# Patient Record
Sex: FEMALE | Race: BLACK OR AFRICAN AMERICAN | NOT HISPANIC OR LATINO | Employment: FULL TIME | URBAN - METROPOLITAN AREA
[De-identification: names, ages, dates, MRNs, and addresses within clinical notes are randomized per-mention and may not be internally consistent; named-entity substitution may affect disease eponyms.]

---

## 2017-10-17 ENCOUNTER — APPOINTMENT (OUTPATIENT)
Dept: ULTRASOUND IMAGING | Facility: HOSPITAL | Age: 32
End: 2017-10-17

## 2017-10-17 ENCOUNTER — HOSPITAL ENCOUNTER (EMERGENCY)
Facility: HOSPITAL | Age: 32
Discharge: HOME | End: 2017-10-17
Attending: EMERGENCY MEDICINE | Admitting: EMERGENCY MEDICINE

## 2017-10-17 VITALS
RESPIRATION RATE: 18 BRPM | BODY MASS INDEX: 40.97 KG/M2 | OXYGEN SATURATION: 98 % | SYSTOLIC BLOOD PRESSURE: 170 MMHG | WEIGHT: 240 LBS | HEIGHT: 64 IN | TEMPERATURE: 98.5 F | DIASTOLIC BLOOD PRESSURE: 116 MMHG | HEART RATE: 90 BPM

## 2017-10-17 DIAGNOSIS — D25.9 UTERINE LEIOMYOMA, UNSPECIFIED LOCATION: ICD-10-CM

## 2017-10-17 DIAGNOSIS — R10.30 LOWER ABDOMINAL PAIN: Primary | ICD-10-CM

## 2017-10-17 DIAGNOSIS — N83.201 CYSTS OF BOTH OVARIES: ICD-10-CM

## 2017-10-17 DIAGNOSIS — N83.202 CYSTS OF BOTH OVARIES: ICD-10-CM

## 2017-10-17 DIAGNOSIS — I15.9 SECONDARY HYPERTENSION: ICD-10-CM

## 2017-10-17 LAB
ALBUMIN SERPL-MCNC: 4.4 G/DL (ref 3.2–4.8)
ALBUMIN/GLOB SERPL: 1.4 G/DL (ref 1.5–2.5)
ALP SERPL-CCNC: 72 U/L (ref 25–100)
ALT SERPL W P-5'-P-CCNC: 24 U/L (ref 7–40)
ANION GAP SERPL CALCULATED.3IONS-SCNC: 4 MMOL/L (ref 3–11)
AST SERPL-CCNC: 16 U/L (ref 0–33)
B-HCG UR QL: NEGATIVE
BACTERIA UR QL AUTO: ABNORMAL /HPF
BASOPHILS # BLD AUTO: 0.05 10*3/MM3 (ref 0–0.2)
BASOPHILS NFR BLD AUTO: 0.6 % (ref 0–1)
BILIRUB SERPL-MCNC: 0.3 MG/DL (ref 0.3–1.2)
BILIRUB UR QL STRIP: NEGATIVE
BUN BLD-MCNC: 15 MG/DL (ref 9–23)
BUN/CREAT SERPL: 18.8 (ref 7–25)
CALCIUM SPEC-SCNC: 9.4 MG/DL (ref 8.7–10.4)
CHLORIDE SERPL-SCNC: 105 MMOL/L (ref 99–109)
CLARITY UR: ABNORMAL
CLUE CELLS SPEC QL WET PREP: ABNORMAL
CO2 SERPL-SCNC: 30 MMOL/L (ref 20–31)
COLOR UR: YELLOW
CREAT BLD-MCNC: 0.8 MG/DL (ref 0.6–1.3)
DEPRECATED RDW RBC AUTO: 45.9 FL (ref 37–54)
EOSINOPHIL # BLD AUTO: 0.14 10*3/MM3 (ref 0–0.3)
EOSINOPHIL NFR BLD AUTO: 1.8 % (ref 0–3)
ERYTHROCYTE [DISTWIDTH] IN BLOOD BY AUTOMATED COUNT: 15.6 % (ref 11.3–14.5)
GFR SERPL CREATININE-BSD FRML MDRD: 100 ML/MIN/1.73
GLOBULIN UR ELPH-MCNC: 3.2 GM/DL
GLUCOSE BLD-MCNC: 97 MG/DL (ref 70–100)
GLUCOSE UR STRIP-MCNC: NEGATIVE MG/DL
HCT VFR BLD AUTO: 32.7 % (ref 34.5–44)
HGB BLD-MCNC: 9.8 G/DL (ref 11.5–15.5)
HGB UR QL STRIP.AUTO: NEGATIVE
HOLD SPECIMEN: NORMAL
HOLD SPECIMEN: NORMAL
HYALINE CASTS UR QL AUTO: ABNORMAL /LPF
HYDATID CYST SPEC WET PREP: ABNORMAL
IMM GRANULOCYTES # BLD: 0.01 10*3/MM3 (ref 0–0.03)
IMM GRANULOCYTES NFR BLD: 0.1 % (ref 0–0.6)
INTERNAL NEGATIVE CONTROL: NEGATIVE
INTERNAL POSITIVE CONTROL: POSITIVE
KETONES UR QL STRIP: NEGATIVE
KOH PREP NAIL: NORMAL
LEUKOCYTE ESTERASE UR QL STRIP.AUTO: ABNORMAL
LIPASE SERPL-CCNC: 36 U/L (ref 6–51)
LYMPHOCYTES # BLD AUTO: 3.24 10*3/MM3 (ref 0.6–4.8)
LYMPHOCYTES NFR BLD AUTO: 41 % (ref 24–44)
Lab: NORMAL
MCH RBC QN AUTO: 24.4 PG (ref 27–31)
MCHC RBC AUTO-ENTMCNC: 30 G/DL (ref 32–36)
MCV RBC AUTO: 81.3 FL (ref 80–99)
MONOCYTES # BLD AUTO: 0.57 10*3/MM3 (ref 0–1)
MONOCYTES NFR BLD AUTO: 7.2 % (ref 0–12)
NEUTROPHILS # BLD AUTO: 3.89 10*3/MM3 (ref 1.5–8.3)
NEUTROPHILS NFR BLD AUTO: 49.3 % (ref 41–71)
NITRITE UR QL STRIP: NEGATIVE
PH UR STRIP.AUTO: 6 [PH] (ref 5–8)
PLATELET # BLD AUTO: 427 10*3/MM3 (ref 150–450)
PMV BLD AUTO: 9.6 FL (ref 6–12)
POTASSIUM BLD-SCNC: 4 MMOL/L (ref 3.5–5.5)
PROT SERPL-MCNC: 7.6 G/DL (ref 5.7–8.2)
PROT UR QL STRIP: NEGATIVE
RBC # BLD AUTO: 4.02 10*6/MM3 (ref 3.89–5.14)
RBC # UR: ABNORMAL /HPF
REF LAB TEST METHOD: ABNORMAL
SODIUM BLD-SCNC: 139 MMOL/L (ref 132–146)
SP GR UR STRIP: 1.03 (ref 1–1.03)
SQUAMOUS #/AREA URNS HPF: ABNORMAL /HPF
T VAGINALIS SPEC QL WET PREP: ABNORMAL
UROBILINOGEN UR QL STRIP: ABNORMAL
WBC NRBC COR # BLD: 7.9 10*3/MM3 (ref 3.5–10.8)
WBC SPEC QL WET PREP: ABNORMAL
WBC UR QL AUTO: ABNORMAL /HPF
WHOLE BLOOD HOLD SPECIMEN: NORMAL
WHOLE BLOOD HOLD SPECIMEN: NORMAL
YEAST GENITAL QL WET PREP: ABNORMAL

## 2017-10-17 PROCEDURE — 80053 COMPREHEN METABOLIC PANEL: CPT | Performed by: EMERGENCY MEDICINE

## 2017-10-17 PROCEDURE — 76830 TRANSVAGINAL US NON-OB: CPT

## 2017-10-17 PROCEDURE — 87591 N.GONORRHOEAE DNA AMP PROB: CPT | Performed by: PHYSICIAN ASSISTANT

## 2017-10-17 PROCEDURE — 25010000002 CEFTRIAXONE PER 250 MG: Performed by: PHYSICIAN ASSISTANT

## 2017-10-17 PROCEDURE — 99284 EMERGENCY DEPT VISIT MOD MDM: CPT

## 2017-10-17 PROCEDURE — 83690 ASSAY OF LIPASE: CPT | Performed by: EMERGENCY MEDICINE

## 2017-10-17 PROCEDURE — 87086 URINE CULTURE/COLONY COUNT: CPT | Performed by: EMERGENCY MEDICINE

## 2017-10-17 PROCEDURE — 96372 THER/PROPH/DIAG INJ SC/IM: CPT

## 2017-10-17 PROCEDURE — 87491 CHLMYD TRACH DNA AMP PROBE: CPT | Performed by: PHYSICIAN ASSISTANT

## 2017-10-17 PROCEDURE — 25010000002 KETOROLAC TROMETHAMINE PER 15 MG: Performed by: PHYSICIAN ASSISTANT

## 2017-10-17 PROCEDURE — 96374 THER/PROPH/DIAG INJ IV PUSH: CPT

## 2017-10-17 PROCEDURE — 85025 COMPLETE CBC W/AUTO DIFF WBC: CPT | Performed by: EMERGENCY MEDICINE

## 2017-10-17 PROCEDURE — 87210 SMEAR WET MOUNT SALINE/INK: CPT | Performed by: PHYSICIAN ASSISTANT

## 2017-10-17 PROCEDURE — 81001 URINALYSIS AUTO W/SCOPE: CPT | Performed by: EMERGENCY MEDICINE

## 2017-10-17 PROCEDURE — 87220 TISSUE EXAM FOR FUNGI: CPT | Performed by: PHYSICIAN ASSISTANT

## 2017-10-17 RX ORDER — LISINOPRIL 10 MG/1
10 TABLET ORAL DAILY
Qty: 30 TABLET | Refills: 0 | Status: SHIPPED | OUTPATIENT
Start: 2017-10-17 | End: 2017-11-03 | Stop reason: ALTCHOICE

## 2017-10-17 RX ORDER — IBUPROFEN 800 MG/1
800 TABLET ORAL EVERY 8 HOURS PRN
Qty: 30 TABLET | Refills: 0 | Status: SHIPPED | OUTPATIENT
Start: 2017-10-17 | End: 2018-11-20 | Stop reason: ALTCHOICE

## 2017-10-17 RX ORDER — LIDOCAINE HYDROCHLORIDE 10 MG/ML
0.9 INJECTION, SOLUTION EPIDURAL; INFILTRATION; INTRACAUDAL; PERINEURAL ONCE
Status: COMPLETED | OUTPATIENT
Start: 2017-10-17 | End: 2017-10-17

## 2017-10-17 RX ORDER — LISINOPRIL 10 MG/1
10 TABLET ORAL ONCE
Status: COMPLETED | OUTPATIENT
Start: 2017-10-17 | End: 2017-10-17

## 2017-10-17 RX ORDER — SODIUM CHLORIDE 0.9 % (FLUSH) 0.9 %
10 SYRINGE (ML) INJECTION AS NEEDED
Status: DISCONTINUED | OUTPATIENT
Start: 2017-10-17 | End: 2017-10-17 | Stop reason: HOSPADM

## 2017-10-17 RX ORDER — LISINOPRIL 10 MG/1
10 TABLET ORAL DAILY
COMMUNITY
End: 2017-10-17

## 2017-10-17 RX ORDER — AZITHROMYCIN 250 MG/1
1000 TABLET, FILM COATED ORAL ONCE
Status: COMPLETED | OUTPATIENT
Start: 2017-10-17 | End: 2017-10-17

## 2017-10-17 RX ORDER — CLONIDINE HYDROCHLORIDE 0.1 MG/1
0.1 TABLET ORAL ONCE
Status: COMPLETED | OUTPATIENT
Start: 2017-10-17 | End: 2017-10-17

## 2017-10-17 RX ORDER — CEFTRIAXONE SODIUM 250 MG/1
250 INJECTION, POWDER, FOR SOLUTION INTRAMUSCULAR; INTRAVENOUS ONCE
Status: COMPLETED | OUTPATIENT
Start: 2017-10-17 | End: 2017-10-17

## 2017-10-17 RX ORDER — KETOROLAC TROMETHAMINE 15 MG/ML
15 INJECTION, SOLUTION INTRAMUSCULAR; INTRAVENOUS ONCE
Status: COMPLETED | OUTPATIENT
Start: 2017-10-17 | End: 2017-10-17

## 2017-10-17 RX ADMIN — CLONIDINE HYDROCHLORIDE 0.1 MG: 0.1 TABLET ORAL at 15:02

## 2017-10-17 RX ADMIN — CEFTRIAXONE SODIUM 250 MG: 250 INJECTION, POWDER, FOR SOLUTION INTRAMUSCULAR; INTRAVENOUS at 14:50

## 2017-10-17 RX ADMIN — KETOROLAC TROMETHAMINE 15 MG: 15 INJECTION, SOLUTION INTRAMUSCULAR; INTRAVENOUS at 16:08

## 2017-10-17 RX ADMIN — AZITHROMYCIN 1000 MG: 250 TABLET, FILM COATED ORAL at 14:46

## 2017-10-17 RX ADMIN — Medication 10 ML: at 12:10

## 2017-10-17 RX ADMIN — LISINOPRIL 10 MG: 10 TABLET ORAL at 13:20

## 2017-10-17 RX ADMIN — LIDOCAINE HYDROCHLORIDE 0.9 ML: 10 INJECTION, SOLUTION EPIDURAL; INFILTRATION; INTRACAUDAL; PERINEURAL at 14:50

## 2017-10-17 NOTE — ED PROVIDER NOTES
Subjective   HPI Comments: Pt presenting to ED with lower abdominal pain. Pt explains she has been having the pain for 6-7 months intermittently, typically worse about 2 weeks after her period. LMP  First week of October. Hx . Pt describes the pain as a pressure and aching. She denies N,V,D or constipation. No urinary sx or fevers. She has white vaginal discharge but unsure if this is normal for her. She has not been sexually active in a year. Denies prior hx of STDs. She has not been seen by her PCP at  for these complaints. She takes Motrin for the pain which does help. She denies prior hx of abd surgery. Mother with hx of Endometrial cancer. Also noted BP elevation. She states she is supposed to take Lisinopril but does not take regularly. She states her BP typically runs 180s. She denies CP, SOB, headache or visual changes.     Patient is a 32 y.o. female presenting with abdominal pain.   History provided by:  Patient  Abdominal Pain   Pain location: Lower abdomen, right worse than left   Pain quality: aching, bloating and pressure    Pain radiates to:  Does not radiate  Duration: 6-7 months.  Timing:  Intermittent  Progression since onset: Typically 2 weeks after period.  Context: not alcohol use, not diet changes, not medication withdrawal, not recent illness and not recent travel    Relieved by:  NSAIDs  Associated symptoms: vaginal discharge (Unsure if different than normal white)    Associated symptoms: no chest pain, no chills, no constipation, no cough, no diarrhea, no dysuria, no fever, no hematuria, no nausea, no shortness of breath, no sore throat, no vaginal bleeding and no vomiting    Risk factors: no alcohol abuse, has not had multiple surgeries (No prior abd surgery ) and not pregnant        Review of Systems   Constitutional: Negative for chills and fever.   HENT: Negative for congestion, ear pain, sore throat and trouble swallowing.    Eyes: Negative for pain, redness and visual  disturbance.   Respiratory: Negative for cough, chest tightness and shortness of breath.    Cardiovascular: Negative for chest pain and leg swelling.   Gastrointestinal: Positive for abdominal pain. Negative for constipation, diarrhea, nausea and vomiting.   Genitourinary: Positive for vaginal discharge (Unsure if different than normal white). Negative for difficulty urinating, dysuria, flank pain, hematuria, urgency, vaginal bleeding and vaginal pain.   Musculoskeletal: Negative for arthralgias, back pain and joint swelling.   Skin: Negative for rash and wound.   Neurological: Negative for dizziness, syncope, speech difficulty, weakness, numbness and headaches.   Psychiatric/Behavioral: Negative for confusion.   All other systems reviewed and are negative.      Past Medical History:   Diagnosis Date   • Hypertension        No Known Allergies    Past Surgical History:   Procedure Laterality Date   • DENTAL PROCEDURE     • TONSILLECTOMY         History reviewed. No pertinent family history.    Social History     Social History   • Marital status: Single     Spouse name: N/A   • Number of children: N/A   • Years of education: N/A     Social History Main Topics   • Smoking status: Never Smoker   • Smokeless tobacco: None   • Alcohol use None      Comment: OCCASIONALLY   • Drug use: No   • Sexual activity: Not Asked     Other Topics Concern   • None     Social History Narrative   • None           Objective   Physical Exam   Constitutional: She is oriented to person, place, and time. Vital signs are normal. She appears well-developed.   HENT:   Head: Atraumatic.   Nose: Nose normal.   Mouth/Throat: Mucous membranes are normal.   Eyes: Conjunctivae, EOM and lids are normal. Pupils are equal, round, and reactive to light.   Neck: Normal range of motion. Neck supple.   Cardiovascular: Normal rate, regular rhythm and normal heart sounds.    Pulmonary/Chest: Effort normal and breath sounds normal. She has no wheezes.    Abdominal: Soft. She exhibits no distension. There is tenderness in the right lower quadrant, suprapubic area and left lower quadrant. There is no rebound, no guarding and no CVA tenderness.   Obese    Genitourinary: Cervix exhibits no motion tenderness. Vaginal discharge (white) found.   Musculoskeletal: Normal range of motion. She exhibits no edema or tenderness.   Neurological: She is alert and oriented to person, place, and time. No sensory deficit.   Skin: Skin is warm and dry. No rash noted. No erythema.   Psychiatric: She has a normal mood and affect. Her speech is normal and behavior is normal.   Nursing note and vitals reviewed.      Procedures         ED Course  ED Course      Re-examined patient several times in ED. No new complaints. Discussed results and need to f/u with PCP and OBGYN as soon as possible, especially with family hx. She states she understands. No signs of an acute abdomen or appendicitis. She understands to return to ED if new or worse sx. She is agreeable to tx with Rocephin and Azithromycin to cover for possible STD cause of pain / vaginal discharge. She also understands to keep a log of her BP and f/u with PCP regarding elevation ASAP and to take her medication regularly.     Recent Results (from the past 24 hour(s))   Comprehensive Metabolic Panel    Collection Time: 10/17/17 12:09 PM   Result Value Ref Range    Glucose 97 70 - 100 mg/dL    BUN 15 9 - 23 mg/dL    Creatinine 0.80 0.60 - 1.30 mg/dL    Sodium 139 132 - 146 mmol/L    Potassium 4.0 3.5 - 5.5 mmol/L    Chloride 105 99 - 109 mmol/L    CO2 30.0 20.0 - 31.0 mmol/L    Calcium 9.4 8.7 - 10.4 mg/dL    Total Protein 7.6 5.7 - 8.2 g/dL    Albumin 4.40 3.20 - 4.80 g/dL    ALT (SGPT) 24 7 - 40 U/L    AST (SGOT) 16 0 - 33 U/L    Alkaline Phosphatase 72 25 - 100 U/L    Total Bilirubin 0.3 0.3 - 1.2 mg/dL    eGFR  African Amer 100 >60 mL/min/1.73    Globulin 3.2 gm/dL    A/G Ratio 1.4 (L) 1.5 - 2.5 g/dL    BUN/Creatinine Ratio 18.8  7.0 - 25.0    Anion Gap 4.0 3.0 - 11.0 mmol/L   Lipase    Collection Time: 10/17/17 12:09 PM   Result Value Ref Range    Lipase 36 6 - 51 U/L   Light Blue Top    Collection Time: 10/17/17 12:09 PM   Result Value Ref Range    Extra Tube hold for add-on    Green Top (Gel)    Collection Time: 10/17/17 12:09 PM   Result Value Ref Range    Extra Tube Hold for add-ons.    Lavender Top    Collection Time: 10/17/17 12:09 PM   Result Value Ref Range    Extra Tube hold for add-on    Gold Top - SST    Collection Time: 10/17/17 12:09 PM   Result Value Ref Range    Extra Tube Hold for add-ons.    CBC Auto Differential    Collection Time: 10/17/17 12:09 PM   Result Value Ref Range    WBC 7.90 3.50 - 10.80 10*3/mm3    RBC 4.02 3.89 - 5.14 10*6/mm3    Hemoglobin 9.8 (L) 11.5 - 15.5 g/dL    Hematocrit 32.7 (L) 34.5 - 44.0 %    MCV 81.3 80.0 - 99.0 fL    MCH 24.4 (L) 27.0 - 31.0 pg    MCHC 30.0 (L) 32.0 - 36.0 g/dL    RDW 15.6 (H) 11.3 - 14.5 %    RDW-SD 45.9 37.0 - 54.0 fl    MPV 9.6 6.0 - 12.0 fL    Platelets 427 150 - 450 10*3/mm3    Neutrophil % 49.3 41.0 - 71.0 %    Lymphocyte % 41.0 24.0 - 44.0 %    Monocyte % 7.2 0.0 - 12.0 %    Eosinophil % 1.8 0.0 - 3.0 %    Basophil % 0.6 0.0 - 1.0 %    Immature Grans % 0.1 0.0 - 0.6 %    Neutrophils, Absolute 3.89 1.50 - 8.30 10*3/mm3    Lymphocytes, Absolute 3.24 0.60 - 4.80 10*3/mm3    Monocytes, Absolute 0.57 0.00 - 1.00 10*3/mm3    Eosinophils, Absolute 0.14 0.00 - 0.30 10*3/mm3    Basophils, Absolute 0.05 0.00 - 0.20 10*3/mm3    Immature Grans, Absolute 0.01 0.00 - 0.03 10*3/mm3   Urinalysis With / Culture If Indicated - Urine, Clean Catch    Collection Time: 10/17/17  1:20 PM   Result Value Ref Range    Color, UA Yellow Yellow, Straw    Appearance, UA Cloudy (A) Clear    pH, UA 6.0 5.0 - 8.0    Specific Gravity, UA 1.028 1.001 - 1.030    Glucose, UA Negative Negative    Ketones, UA Negative Negative    Bilirubin, UA Negative Negative    Blood, UA Negative Negative    Protein, UA  Negative Negative    Leuk Esterase, UA Moderate (2+) (A) Negative    Nitrite, UA Negative Negative    Urobilinogen, UA 1.0 E.U./dL 0.2 - 1.0 E.U./dL   Urinalysis, Microscopic Only - Urine, Clean Catch    Collection Time: 10/17/17  1:20 PM   Result Value Ref Range    RBC, UA 3-6 (A) None Seen, 0-2 /HPF    WBC, UA 6-12 (A) None Seen /HPF    Bacteria, UA None Seen None Seen, Trace /HPF    Squamous Epithelial Cells, UA 7-12 (A) None Seen, 0-2 /HPF    Hyaline Casts, UA 0-6 0 - 6 /LPF    Methodology Automated Microscopy    POCT pregnancy, urine    Collection Time: 10/17/17  1:21 PM   Result Value Ref Range    HCG, Urine, QL Negative Negative    Lot Number [id number]     Internal Positive Control Positive     Internal Negative Control Negative    KOH Prep - Swab, Vagina    Collection Time: 10/17/17  2:12 PM   Result Value Ref Range    KOH Prep No yeast or hyphal elements seen No yeast or hyphal elements seen   Wet Prep, Genital - Swab, Vagina    Collection Time: 10/17/17  2:12 PM   Result Value Ref Range    YEAST No yeast seen No yeast seen    HYPHAL ELEMENTS No Hyphal elements seen No Hyphal elements seen    WBC'S 1+ WBC's seen (A) No WBC's seen    Clue Cells, Wet Prep No Clue cells seen No Clue cells seen    Trichomonas, Wet Prep No Trichomonas seen No Trichomonas seen     Note: In addition to lab results from this visit, the labs listed above may include labs taken at another facility or during a different encounter within the last 24 hours. Please correlate lab times with ED admission and discharge times for further clarification of the services performed during this visit.    US Non-ob Transvaginal   Preliminary Result   1. Enlarged uterus with multiple fibroids.   2. Complex bilateral ovarian cysts, 3 cm in diameter on the right and 2   cm on the left.       D:  10/17/2017   E:  10/17/2017                     Vitals:    10/17/17 1444 10/17/17 1501 10/17/17 1539 10/17/17 1605   BP: (!) 189/123 (!) 189/119 (!) 180/119  (!) 170/116   BP Location:       Patient Position:       Pulse: 86 90 95 90   Resp: 18  18 18   Temp:       TempSrc:       SpO2: 99%  99% 98%   Weight:       Height:         Medications   sodium chloride 0.9 % flush 10 mL (10 mL Intravenous Given by Other 10/17/17 1210)   lisinopril (PRINIVIL,ZESTRIL) tablet 10 mg (10 mg Oral Given 10/17/17 1320)   cefTRIAXone (ROCEPHIN) injection 250 mg (250 mg Intramuscular Given 10/17/17 1450)   lidocaine PF 1% (XYLOCAINE) injection 0.9 mL (0.9 mL Injection Given 10/17/17 1450)   azithromycin (ZITHROMAX) tablet 1,000 mg (1,000 mg Oral Given 10/17/17 1446)   CloNIDine (CATAPRES) tablet 0.1 mg (0.1 mg Oral Given 10/17/17 1502)   ketorolac (TORADOL) injection 15 mg (15 mg Intravenous Given 10/17/17 1608)                      MDM    Final diagnoses:   Lower abdominal pain   Uterine leiomyoma, unspecified location   Secondary hypertension   Cysts of both ovaries            WKASI Contreras  10/17/17 1597

## 2017-10-19 LAB
BACTERIA SPEC AEROBE CULT: NORMAL
BACTERIA SPEC AEROBE CULT: NORMAL
C TRACH RRNA SPEC DONR QL NAA+PROBE: NEGATIVE
N GONORRHOEA DNA SPEC QL NAA+PROBE: NEGATIVE

## 2017-11-03 ENCOUNTER — OFFICE VISIT (OUTPATIENT)
Dept: OBSTETRICS AND GYNECOLOGY | Facility: CLINIC | Age: 32
End: 2017-11-03

## 2017-11-03 ENCOUNTER — LAB REQUISITION (OUTPATIENT)
Dept: LAB | Facility: HOSPITAL | Age: 32
End: 2017-11-03

## 2017-11-03 VITALS
BODY MASS INDEX: 42.14 KG/M2 | SYSTOLIC BLOOD PRESSURE: 160 MMHG | WEIGHT: 246.8 LBS | HEART RATE: 87 BPM | DIASTOLIC BLOOD PRESSURE: 110 MMHG | RESPIRATION RATE: 14 BRPM | HEIGHT: 64 IN | OXYGEN SATURATION: 99 %

## 2017-11-03 DIAGNOSIS — D25.1 INTRAMURAL LEIOMYOMA OF UTERUS: ICD-10-CM

## 2017-11-03 DIAGNOSIS — D25.2 INTRAMURAL AND SUBSEROUS LEIOMYOMA OF UTERUS: Primary | ICD-10-CM

## 2017-11-03 DIAGNOSIS — Z01.419 ENCOUNTER FOR GYNECOLOGICAL EXAMINATION WITHOUT ABNORMAL FINDING: ICD-10-CM

## 2017-11-03 DIAGNOSIS — D25.1 INTRAMURAL AND SUBSEROUS LEIOMYOMA OF UTERUS: Primary | ICD-10-CM

## 2017-11-03 DIAGNOSIS — Z00.00 ROUTINE GENERAL MEDICAL EXAMINATION AT A HEALTH CARE FACILITY: ICD-10-CM

## 2017-11-03 PROCEDURE — 99385 PREV VISIT NEW AGE 18-39: CPT | Performed by: OBSTETRICS & GYNECOLOGY

## 2017-11-03 PROCEDURE — 36415 COLL VENOUS BLD VENIPUNCTURE: CPT | Performed by: OBSTETRICS & GYNECOLOGY

## 2017-11-03 RX ORDER — AMLODIPINE BESYLATE 10 MG/1
10 TABLET ORAL DAILY
COMMUNITY
End: 2019-02-11 | Stop reason: SDUPTHER

## 2017-11-04 LAB — CANCER AG125 SERPL-ACNC: 17.7 U/ML (ref 0–38.1)

## 2017-11-08 ENCOUNTER — RESULTS ENCOUNTER (OUTPATIENT)
Dept: OBSTETRICS AND GYNECOLOGY | Facility: CLINIC | Age: 32
End: 2017-11-08

## 2017-11-08 DIAGNOSIS — D25.2 INTRAMURAL AND SUBSEROUS LEIOMYOMA OF UTERUS: ICD-10-CM

## 2017-11-08 DIAGNOSIS — D25.1 INTRAMURAL AND SUBSEROUS LEIOMYOMA OF UTERUS: ICD-10-CM

## 2017-11-13 NOTE — PROGRESS NOTES
Subjective   Chief Complaint   Patient presents with   • Establish Care     Seen in ER for pelvic pain and was Dx'd with Uterine fibroid and john ovarian cyst.     Deanna Putnam is a 32 y.o. year old  presenting to be seen for her annual exam. She was seen in the OR approximately 2 weeks ago with pelvic pain and was noted to have uterine fibroids and bilateral ovarian cysts.  The pain has been going on since mid April, occurs midcycle and lasts for 6-10 days on average.  Pain has been increasing in intensity since starting in April and is described as crampy in nature.  No aggravating or alleviating factors.    SEXUAL Hx:  She is not currently sexually active.  In the past year there has not been new sexual partners.    Condoms are not typically used.  She would not like to be screened for STD's at today's exam.  Current birth control method: abstinence.  She is happy with her current method of contraception and does not want to discuss alternative methods of contraception.  MENSTRUAL Hx:  Patient's last menstrual period was 10/29/2017 (exact date).  In the past 6 months her cycles have been regular, predictable and occur monthly.  Her menstrual flow is normal.   Each month on average there are roughly 0 day(s) of very heavy flow.    Intermenstrual bleeding is absent.    Post-coital bleeding is absent.  Dysmenorrhea: is not affecting her activities of daily living  PMS: is not affecting her activities of daily living  Her cycles are not a source of concern for her that she wishes to discuss today.  HEALTH Hx:  She exercises regularly: no (and has no plans to become more active).  She wears her seat belt: yes.  She has concerns about domestic violence: no.  OTHER COMPLAINTS:  Nothing else    The following portions of the patient's history were reviewed and updated as appropriate:problem list, current medications, allergies, past family history, past medical history, past social history and past surgical  "history.    Smoking status: Never Smoker                                                              Smokeless status: Never Used                        Review of Systems  Constitutional POS: nothing reported    NEG: anorexia or night sweats   Gastointestinal POS: nothing reported    NEG: bloating, change in bowel habits, melena or reflux symptoms   Genitourinary POS: see HPI    NEG: dysuria or hematuria   Integument POS: nothing reported    NEG: moles that are changing in size, shape, color or rashes   Breast POS: nothing reported    NEG: persistent breast lump, skin dimpling or nipple discharge        Objective   BP (!) 160/110  Pulse 87  Resp 14  Ht 64\" (162.6 cm)  Wt 246 lb 12.8 oz (112 kg)  LMP 10/29/2017 (Exact Date)  SpO2 99%  Breastfeeding? No  BMI 42.36 kg/m2    General:  well developed; well nourished  no acute distress   Skin:  No suspicious lesions seen   Thyroid: normal to inspection and palpation   Breasts:  Examined in supine position  Symmetric without masses or skin dimpling  Nipples normal without inversion, lesions or discharge  There are no palpable axillary nodes   Abdomen: soft, non-tender; no masses  no umbilical or inginual hernias are present  no hepato-splenomegaly   Pelvis: Clinical staff was present for exam  External genitalia:  normal appearance of the external genitalia including Bartholin's and [place]'s glands.  :  urethral meatus normal;  Vaginal:  normal pink mucosa without prolapse or lesions.  Cervix:   normal appearance. Pap smear collected  Uterus:  normal size, shape and consistency. asymmetrically enlarged, consistent with 11 weeks size;  Adnexa:  normal bimanual exam of the adnexa.        Assessment   1. Well woman exam  2. Pelvic pain     Plan   1. Await Pap smear, lab and ultrasound results for plan of care.  Patient will return to clinic in 6 weeks for follow-up.      New Medications Ordered This Visit   Medications   • metFORMIN (GLUCOPHAGE) 500 MG tablet     " Sig: Take 500 mg by mouth Daily With Breakfast.   • amLODIPine (NORVASC) 10 MG tablet     Sig: Take 10 mg by mouth Daily.          This note was electronically signed.    Jorge Alberto Arellano MD [facility]  November 13, 2017

## 2017-12-18 ENCOUNTER — OFFICE VISIT (OUTPATIENT)
Dept: OBSTETRICS AND GYNECOLOGY | Facility: CLINIC | Age: 32
End: 2017-12-18

## 2017-12-18 VITALS
OXYGEN SATURATION: 99 % | WEIGHT: 246 LBS | DIASTOLIC BLOOD PRESSURE: 88 MMHG | HEIGHT: 64 IN | HEART RATE: 86 BPM | RESPIRATION RATE: 14 BRPM | SYSTOLIC BLOOD PRESSURE: 120 MMHG | BODY MASS INDEX: 42 KG/M2

## 2017-12-18 DIAGNOSIS — R11.0 CHRONIC NAUSEA: ICD-10-CM

## 2017-12-18 DIAGNOSIS — D25.1 INTRAMURAL AND SUBSEROUS LEIOMYOMA OF UTERUS: Primary | ICD-10-CM

## 2017-12-18 DIAGNOSIS — D25.2 INTRAMURAL AND SUBSEROUS LEIOMYOMA OF UTERUS: Primary | ICD-10-CM

## 2017-12-18 PROCEDURE — 99214 OFFICE O/P EST MOD 30 MIN: CPT | Performed by: OBSTETRICS & GYNECOLOGY

## 2017-12-18 RX ORDER — INFLUENZA A VIRUS A/SINGAPORE/GP1908/2015 IVR-180 (H1N1) ANTIGEN (MDCK CELL DERIVED, PROPIOLACTONE INACTIVATED), INFLUENZA A VIRUS A/NORTH CAROLINA/04/2016 (H3N2) HEMAGGLUTININ ANTIGEN (MDCK CELL DERIVED, PROPIOLACTONE INACTIVATED), INFLUENZA B VIRUS B/IOWA/06/2017 HEMAGGLUTININ ANTIGEN (MDCK CELL DERIVED, PROPIOLACTONE INACTIVATED), INFLUENZA B VIRUS B/SINGAPORE/INFTT-16-0610/2016 HEMAGGLUTININ ANTIGEN (MDCK CELL DERIVED, PROPIOLACTONE INACTIVATED) 15; 15; 15; 15 UG/.5ML; UG/.5ML; UG/.5ML; UG/.5ML
INJECTION, SUSPENSION INTRAMUSCULAR
Refills: 0 | COMMUNITY
Start: 2017-11-06 | End: 2018-01-11

## 2017-12-18 RX ORDER — METOCLOPRAMIDE 10 MG/1
10 TABLET ORAL
Qty: 120 TABLET | Refills: 5 | Status: SHIPPED | OUTPATIENT
Start: 2017-12-18 | End: 2018-11-20

## 2018-01-11 ENCOUNTER — OFFICE VISIT (OUTPATIENT)
Dept: GASTROENTEROLOGY | Facility: CLINIC | Age: 33
End: 2018-01-11

## 2018-01-11 VITALS
SYSTOLIC BLOOD PRESSURE: 138 MMHG | BODY MASS INDEX: 42.37 KG/M2 | TEMPERATURE: 96.9 F | WEIGHT: 248.2 LBS | DIASTOLIC BLOOD PRESSURE: 98 MMHG | HEIGHT: 64 IN | OXYGEN SATURATION: 99 % | HEART RATE: 82 BPM

## 2018-01-11 DIAGNOSIS — Z83.79 FAMILY HISTORY OF GALLBLADDER DISEASE: ICD-10-CM

## 2018-01-11 DIAGNOSIS — R10.13 EPIGASTRIC PAIN: ICD-10-CM

## 2018-01-11 DIAGNOSIS — R11.0 NAUSEA: Primary | ICD-10-CM

## 2018-01-11 PROCEDURE — 99243 OFF/OP CNSLTJ NEW/EST LOW 30: CPT | Performed by: INTERNAL MEDICINE

## 2018-01-11 NOTE — PROGRESS NOTES
PCP: Radha Kirk MD    Chief Complaint   Patient presents with   • Nausea       History of Present Illness:   HPI  I appreciate the consultation for nausea. Ms. Putnam is a 32-year-old with a history of hypertension, depression and prediabetes.  She has experienced issues with nausea that started in August of last year.  The symptoms seem to be more severe in November of last year.  The patient denies any dinorah vomiting.  There is no history of difficult or painful swallowing.  She does complain of increased gas and may have some bloating at times.  The patient denies any localized abdominal pain after meals.  She has regular bowel habits without any signs of gastrointestinal bleeding.  There is no history of night sweats, fever or chills.  Ms. Putnam denies any unexplained weight loss.  The patient admits that her father and sister both had gallbladder disease requiring surgery.  There is no family history of gastrointestinal cancer in first-degree relatives.  There is no family history of Crohn's disease or ulcerative colitis.  She does not smoke cigarettes and denies routine alcohol use. She has taken Reglan since her last visit with Dr. Arellano and this did seem to help when she would remember to take the medication.  Past Medical History:   Diagnosis Date   • Asthma    • Depression    • Hypertension    • Osteoarthritis of back    • Prediabetes        Past Surgical History:   Procedure Laterality Date   • EYE SURGERY  2015    eyelid lift   • TONSILLECTOMY     • WISDOM TOOTH EXTRACTION           Current Outpatient Prescriptions:   •  amLODIPine (NORVASC) 10 MG tablet, Take 10 mg by mouth Daily., Disp: , Rfl:   •  ibuprofen (ADVIL,MOTRIN) 800 MG tablet, Take 1 tablet by mouth Every 8 (Eight) Hours As Needed for Mild Pain  or Moderate Pain  for up to 30 doses., Disp: 30 tablet, Rfl: 0  •  metoclopramide (REGLAN) 10 MG tablet, Take 1 tablet by mouth 4 (Four) Times a Day Before Meals & at Bedtime., Disp:  120 tablet, Rfl: 5    No Known Allergies    Family History   Problem Relation Age of Onset   • Endometrial cancer Mother 59   • Breast cancer Maternal Grandmother 80   • Colon cancer Maternal Grandmother 90   • Breast cancer Maternal Aunt 60       Social History     Social History   • Marital status: Single     Spouse name: N/A   • Number of children: N/A   • Years of education: N/A     Occupational History   • Not on file.     Social History Main Topics   • Smoking status: Never Smoker   • Smokeless tobacco: Never Used   • Alcohol use Yes      Comment: OCCASIONALLY   • Drug use: No   • Sexual activity: Not Currently     Other Topics Concern   • Not on file     Social History Narrative       Review of Systems   Constitutional: Negative for activity change, appetite change, fatigue, fever and unexpected weight change.   HENT: Negative for dental problem, hearing loss, mouth sores, postnasal drip, sneezing, trouble swallowing and voice change.    Eyes: Negative for pain, redness, itching and visual disturbance.   Respiratory: Negative for cough, choking, chest tightness, shortness of breath and wheezing.    Cardiovascular: Negative for chest pain, palpitations and leg swelling.   Gastrointestinal: Positive for abdominal pain and nausea. Negative for abdominal distention, anal bleeding, blood in stool, constipation, diarrhea, rectal pain and vomiting.        Heartburn   Endocrine: Negative for cold intolerance, heat intolerance, polydipsia, polyphagia and polyuria.   Genitourinary: Negative.  Negative for dysuria, enuresis, flank pain, hematuria and urgency.   Musculoskeletal: Negative for arthralgias, back pain, gait problem, joint swelling and myalgias.   Skin: Negative for color change, pallor and rash.   Allergic/Immunologic: Negative for environmental allergies, food allergies and immunocompromised state.   Neurological: Negative for dizziness, tremors, seizures, facial asymmetry, speech difficulty, numbness and  headaches.   Hematological: Negative for adenopathy.   Psychiatric/Behavioral: Negative for behavioral problems, confusion, dysphoric mood, hallucinations and self-injury.       Vitals:    01/11/18 0916   BP: 138/98   Pulse: 82   Temp: 96.9 °F (36.1 °C)   SpO2: 99%       Physical Exam   Constitutional: She is oriented to person, place, and time. She appears well-nourished. No distress.   Morbid obesity   HENT:   Head: Normocephalic and atraumatic.   Mouth/Throat: Oropharynx is clear and moist. No oropharyngeal exudate.   Eyes: EOM are normal. No scleral icterus.   Neck: Neck supple. No thyromegaly present.   Cardiovascular: Normal rate, regular rhythm and normal heart sounds.  Exam reveals no gallop.    No murmur heard.  Pulmonary/Chest: Effort normal and breath sounds normal. She has no wheezes. She has no rales.   Abdominal: Soft. Bowel sounds are normal. There is tenderness (epigastrium). There is no rebound and no guarding.   Musculoskeletal: Normal range of motion. She exhibits no edema or tenderness.   Lymphadenopathy:     She has no cervical adenopathy.   Neurological: She is alert and oriented to person, place, and time. She exhibits normal muscle tone.   Skin: Skin is dry. No rash noted. No erythema.   Psychiatric: She has a normal mood and affect. Her behavior is normal. Thought content normal.   Vitals reviewed.      Deanna was seen today for nausea.    Diagnoses and all orders for this visit:    Nausea  -     NM Gastric Emptying; Future  -     US Liver; Future    Epigastric pain  -     NM Gastric Emptying; Future  -     US Liver; Future    Family history of gallbladder disease   The clinical history suggest possible gastroparesis.  The differential diagnosis also includes gallbladder pathology.  She denies any signs of gastrointestinal bleeding but occasionally will take nonsteroidal medication.      Plan: Will schedule a 4 hour gastric emptying study.           Will schedule an ultrasound of the right  upper quadrant to evaluate for cholelithiasis.           Encourage gradual weight reduction.           Further recommendations pending studies.    I spent over 50% of the office visit counseling and answering questions from the patient.

## 2018-01-29 ENCOUNTER — HOSPITAL ENCOUNTER (OUTPATIENT)
Dept: NUCLEAR MEDICINE | Facility: HOSPITAL | Age: 33
Discharge: HOME | End: 2018-01-29
Attending: INTERNAL MEDICINE

## 2018-01-29 ENCOUNTER — TELEPHONE (OUTPATIENT)
Dept: GASTROENTEROLOGY | Facility: CLINIC | Age: 33
End: 2018-01-29

## 2018-01-29 ENCOUNTER — HOSPITAL ENCOUNTER (OUTPATIENT)
Dept: ULTRASOUND IMAGING | Facility: HOSPITAL | Age: 33
Discharge: HOME | End: 2018-01-29
Attending: INTERNAL MEDICINE | Admitting: INTERNAL MEDICINE

## 2018-01-29 DIAGNOSIS — R11.0 NAUSEA: ICD-10-CM

## 2018-01-29 DIAGNOSIS — R10.13 EPIGASTRIC PAIN: ICD-10-CM

## 2018-01-29 PROCEDURE — A9541 TC99M SULFUR COLLOID: HCPCS | Performed by: INTERNAL MEDICINE

## 2018-01-29 PROCEDURE — 0 TECHNETIUM SULFUR COLLOID: Performed by: INTERNAL MEDICINE

## 2018-01-29 PROCEDURE — 76705 ECHO EXAM OF ABDOMEN: CPT

## 2018-01-29 PROCEDURE — 78264 GASTRIC EMPTYING IMG STUDY: CPT

## 2018-01-29 RX ADMIN — TECHNETIUM TC 99M SULFUR COLLOID 1 DOSE: KIT at 09:40

## 2018-01-29 NOTE — TELEPHONE ENCOUNTER
I called and gave Ms. Putnam the results of the emptying study and ultrasound.  The gastric emptying study was normal.  However, she did have multiple gallstones and this is likely the cause of the current symptoms.  I will refer her to Dr. Cassidy -general surgeon.

## 2018-01-30 DIAGNOSIS — R10.13 EPIGASTRIC PAIN: ICD-10-CM

## 2018-01-30 DIAGNOSIS — K80.20 CALCULUS OF GALLBLADDER WITHOUT CHOLECYSTITIS WITHOUT OBSTRUCTION: Primary | ICD-10-CM

## 2018-01-30 DIAGNOSIS — R11.0 NAUSEA: ICD-10-CM

## 2018-04-04 ENCOUNTER — LAB REQUISITION (OUTPATIENT)
Dept: LAB | Facility: HOSPITAL | Age: 33
End: 2018-04-04

## 2018-04-04 DIAGNOSIS — Z00.00 ROUTINE GENERAL MEDICAL EXAMINATION AT A HEALTH CARE FACILITY: ICD-10-CM

## 2018-04-04 PROCEDURE — 36415 COLL VENOUS BLD VENIPUNCTURE: CPT | Performed by: SURGERY

## 2018-04-16 ENCOUNTER — LAB REQUISITION (OUTPATIENT)
Dept: LAB | Facility: HOSPITAL | Age: 33
End: 2018-04-16

## 2018-04-16 DIAGNOSIS — K80.20 CALCULUS OF GALLBLADDER WITHOUT CHOLECYSTITIS WITHOUT OBSTRUCTION: ICD-10-CM

## 2018-04-16 PROCEDURE — 88304 TISSUE EXAM BY PATHOLOGIST: CPT | Performed by: SURGERY

## 2018-04-18 LAB
CYTO UR: NORMAL
LAB AP CASE REPORT: NORMAL
LAB AP CLINICAL INFORMATION: NORMAL
Lab: NORMAL
PATH REPORT.FINAL DX SPEC: NORMAL
PATH REPORT.GROSS SPEC: NORMAL

## 2018-11-20 ENCOUNTER — OFFICE VISIT (OUTPATIENT)
Dept: OBSTETRICS AND GYNECOLOGY | Facility: CLINIC | Age: 33
End: 2018-11-20

## 2018-11-20 VITALS
DIASTOLIC BLOOD PRESSURE: 110 MMHG | SYSTOLIC BLOOD PRESSURE: 162 MMHG | BODY MASS INDEX: 42.47 KG/M2 | HEIGHT: 64 IN | RESPIRATION RATE: 14 BRPM | WEIGHT: 248.8 LBS

## 2018-11-20 DIAGNOSIS — Z01.419 WELL WOMAN EXAM WITH ROUTINE GYNECOLOGICAL EXAM: ICD-10-CM

## 2018-11-20 DIAGNOSIS — G89.29 CHRONIC PELVIC PAIN IN FEMALE: ICD-10-CM

## 2018-11-20 DIAGNOSIS — N94.6 DYSMENORRHEA: ICD-10-CM

## 2018-11-20 DIAGNOSIS — Z01.419 WELL WOMAN EXAM WITH ROUTINE GYNECOLOGICAL EXAM: Primary | ICD-10-CM

## 2018-11-20 DIAGNOSIS — R10.2 CHRONIC PELVIC PAIN IN FEMALE: ICD-10-CM

## 2018-11-20 DIAGNOSIS — N94.10 DYSPAREUNIA IN FEMALE: ICD-10-CM

## 2018-11-20 PROCEDURE — 99395 PREV VISIT EST AGE 18-39: CPT | Performed by: OBSTETRICS & GYNECOLOGY

## 2018-11-20 RX ORDER — NAPROXEN SODIUM 220 MG
220 TABLET ORAL 2 TIMES DAILY PRN
COMMUNITY
End: 2020-06-01

## 2018-11-20 NOTE — PROGRESS NOTES
Subjective   Chief Complaint   Patient presents with   • Gynecologic Exam     Still having pelvic pain     Deanna Putnam is a 33 y.o. year old  presenting to be seen for her annual exam.     SEXUAL Hx:  She is currently sexually active.  In the past year there has not been new sexual partners.    Condoms are not typically used.  She would not like to be screened for STD's at today's exam.  Current birth control method: not using any form of contraception.  She is not trying to conceive but would be OK if she did get pregnant.  She is happy with her current method of contraception and does not want to discuss alternative methods of contraception.  MENSTRUAL Hx:  Patient's last menstrual period was 11/10/2018 (exact date).  In the past 6 months her cycles have been regular, predictable and occur monthly.  Her menstrual flow is normal.   Each month on average there are roughly 0 day(s) of very heavy flow.    Intermenstrual bleeding is absent.    Post-coital bleeding is absent.  Dysmenorrhea: moderate and affecting her activities of daily living  PMS: is not affecting her activities of daily living  Her cycles ARE a source of concern for her that she wishes to discuss today.  HEALTH Hx:  She exercises regularly: no (and has no plans to become more active).  She wears her seat belt: yes.  She has concerns about domestic violence: no.  OTHER COMPLAINTS:  Patient also notes persistent dyspareunia with deep penetration and chronic pelvic pain    The following portions of the patient's history were reviewed and updated as appropriate:problem list, current medications, allergies, past family history, past medical history, past social history and past surgical history.    Social History    Tobacco Use      Smoking status: Never Smoker      Smokeless tobacco: Never Used    Review of Systems  Constitutional POS: nothing reported    NEG: anorexia or night sweats   Gastointestinal POS: nothing reported    NEG: bloating,  "change in bowel habits, melena or reflux symptoms   Genitourinary POS: nothing reported    NEG: dysuria or hematuria   Integument POS: nothing reported    NEG: moles that are changing in size, shape, color or rashes   Breast POS: nothing reported    NEG: persistent breast lump, skin dimpling or nipple discharge        Objective   BP (!) 162/110   Resp 14   Ht 162.6 cm (64\")   Wt 113 kg (248 lb 12.8 oz)   LMP 11/10/2018 (Exact Date)   Breastfeeding? No   BMI 42.71 kg/m²     General:  well developed; well nourished  no acute distress   Skin:  No suspicious lesions seen   Thyroid: normal to inspection and palpation   Breasts:  Examined in supine position  Symmetric without masses or skin dimpling  Nipples normal without inversion, lesions or discharge  There are no palpable axillary nodes   Abdomen: soft, non-tender; no masses  no umbilical or inginual hernias are present  no hepato-splenomegaly   Pelvis: Clinical staff was present for exam  External genitalia:  normal appearance of the external genitalia including Bartholin's and [place]'s glands.  :  urethral meatus normal;  Vaginal:  normal pink mucosa without prolapse or lesions.  Cervix:   normal appearance. Pap smear collected  Uterus:  normal size, shape and consistency. tenderness to palpation is present;  Adnexa:  tenderness of the bilateral adnexa to  superficial and deep palpation;        Assessment   1. Well woman exam  2. Chronic pelvic pain  3. Dyspareunia  4. Dysmenorrhea     Plan   1. Await Pap smear results for plan of care.  Discussed symptoms of chronic pelvic pain and dysmenorrhea with patient.  Symptoms highly suspicious for endometriosis.  Discussed options with patient.  Patient opts for surgical evaluation and management.  Will schedule for diagnostic laparoscopy with possible fulguration of endometriosis      No orders of the defined types were placed in this encounter.         This note was electronically signed.    Jorge Alberto Arellano MD " [facility]  November 20, 2018

## 2018-11-26 ENCOUNTER — TELEPHONE (OUTPATIENT)
Dept: OBSTETRICS AND GYNECOLOGY | Facility: CLINIC | Age: 33
End: 2018-11-26

## 2018-11-26 NOTE — TELEPHONE ENCOUNTER
Adan    Pt been having cramping pains in abdomen since her pap visit last week. At one time dr araiza told her of possible endometriosis. Is this normal to be having these pains?

## 2018-11-27 ENCOUNTER — PREP FOR SURGERY (OUTPATIENT)
Dept: OTHER | Facility: HOSPITAL | Age: 33
End: 2018-11-27

## 2018-11-27 DIAGNOSIS — R10.2 CHRONIC PELVIC PAIN IN FEMALE: Primary | ICD-10-CM

## 2018-11-27 DIAGNOSIS — G89.29 CHRONIC PELVIC PAIN IN FEMALE: Primary | ICD-10-CM

## 2018-11-28 NOTE — TELEPHONE ENCOUNTER
Pain is never normal.  I'm not exactly sure why she is cramping like that after a Pap smear.  But I don't believe her cramping was due to the Pap smear.

## 2018-12-03 ENCOUNTER — OFFICE VISIT (OUTPATIENT)
Dept: OBSTETRICS AND GYNECOLOGY | Facility: CLINIC | Age: 33
End: 2018-12-03

## 2018-12-03 VITALS
DIASTOLIC BLOOD PRESSURE: 88 MMHG | WEIGHT: 250.8 LBS | RESPIRATION RATE: 14 BRPM | BODY MASS INDEX: 42.82 KG/M2 | HEIGHT: 64 IN | SYSTOLIC BLOOD PRESSURE: 136 MMHG

## 2018-12-03 DIAGNOSIS — N94.10 DYSPAREUNIA IN FEMALE: Primary | ICD-10-CM

## 2018-12-03 PROCEDURE — 99212 OFFICE O/P EST SF 10 MIN: CPT | Performed by: OBSTETRICS & GYNECOLOGY

## 2018-12-03 NOTE — PROGRESS NOTES
"Subjective   Chief Complaint   Patient presents with   • Follow-up     Severe lower abdominal pain since last visit     Deanna Putnam is a 33 y.o. year old .  Patient's last menstrual period was 11/10/2018.  She presents to be seen for persistent pelvic pain since her last exam.  Patient notes that she's had pelvic pain since her Pap smear performed approximately 2 weeks ago.  She describes the pain as crampy, radiating throughout the entire pelvis.  She denies any alleviating or aggravating factors.  She states the pain is very similar to pain that she's had after intercourse.    OTHER COMPLAINTS:  Nothing else    The following portions of the patient's history were reviewed and updated as appropriate:current medications and allergies    Social History    Tobacco Use      Smoking status: Never Smoker      Smokeless tobacco: Never Used    Review of Systems  Constitutional POS: nothing reported    NEG: anorexia or night sweats   Gastointestinal POS: nothing reported    NEG: bloating, change in bowel habits, melena or reflux symptoms   Genitourinary POS: see HPI    NEG: dysuria or hematuria   Integument POS: nothing reported    NEG: moles that are changing in size, shape, color or rashes   Breast POS: nothing reported    NEG: persistent breast lump, skin dimpling or nipple discharge         Objective   /88   Resp 14   Ht 162.6 cm (64\")   Wt 114 kg (250 lb 12.8 oz)   LMP 11/10/2018   Breastfeeding? No   BMI 43.05 kg/m²     General:  well developed; well nourished  no acute distress   Skin:  No suspicious lesions seen   Thyroid: normal to inspection and palpation   Lungs:  breathing is unlabored   Heart:  regular rate and rhythm, S1, S2 normal, no murmur, click, rub or gallop   Breasts:  Not performed.   Abdomen: soft, non-tender; no masses  no hepato-splenomegaly  no umbilical or inginual hernias are present   Pelvis: Clinical staff was present for exam  External genitalia:  normal appearance of " the external genitalia including Bartholin's and [place]'s glands.  Cervix:  cervical motion tenderness is present;  Uterus:  normal size, shape and consistency. tenderness to palpation is present;  Adnexa:  tenderness of the bilateral adnexa to  superficial and deep palpation;     Lab Review   No data reviewed    Imaging   No data reviewed        Assessment   1. Pelvic pain     Plan   1. Discussed the pain is still highly suspicious for endometriosis.  Patient is already scheduled for diagnostic laparoscopy.  Patient given reassurance and will return to clinic as previously scheduled      No orders of the defined types were placed in this encounter.         This note was electronically signed.    Jorge Alberto Arellano M.D. [facility]

## 2018-12-10 ENCOUNTER — OFFICE VISIT (OUTPATIENT)
Dept: OBSTETRICS AND GYNECOLOGY | Facility: CLINIC | Age: 33
End: 2018-12-10

## 2018-12-10 VITALS
RESPIRATION RATE: 14 BRPM | SYSTOLIC BLOOD PRESSURE: 140 MMHG | BODY MASS INDEX: 43.16 KG/M2 | DIASTOLIC BLOOD PRESSURE: 90 MMHG | HEIGHT: 64 IN | WEIGHT: 252.8 LBS

## 2018-12-10 DIAGNOSIS — G89.29 CHRONIC PELVIC PAIN IN FEMALE: ICD-10-CM

## 2018-12-10 DIAGNOSIS — N94.10 DYSPAREUNIA IN FEMALE: Primary | ICD-10-CM

## 2018-12-10 DIAGNOSIS — R10.2 CHRONIC PELVIC PAIN IN FEMALE: ICD-10-CM

## 2018-12-10 PROCEDURE — S0260 H&P FOR SURGERY: HCPCS | Performed by: OBSTETRICS & GYNECOLOGY

## 2018-12-13 ENCOUNTER — OUTSIDE FACILITY SERVICE (OUTPATIENT)
Dept: OBSTETRICS AND GYNECOLOGY | Facility: CLINIC | Age: 33
End: 2018-12-13

## 2018-12-13 PROCEDURE — 49320 DIAG LAPARO SEPARATE PROC: CPT | Performed by: OBSTETRICS & GYNECOLOGY

## 2018-12-13 PROCEDURE — 58350 REOPEN FALLOPIAN TUBE: CPT | Performed by: OBSTETRICS & GYNECOLOGY

## 2018-12-17 ENCOUNTER — TELEPHONE (OUTPATIENT)
Dept: OBSTETRICS AND GYNECOLOGY | Facility: CLINIC | Age: 33
End: 2018-12-17

## 2018-12-17 NOTE — TELEPHONE ENCOUNTER
CHARITY    PT HAD SURGERY LAST WEEK AND HAS SOME QUESTIONS SHE'S NOT CLEAR ON. HER PARENTS WERE TOLD SOME INFO BUT PATIENTS WANTS TO HEAR WHAT INFO WAS TOLD TO THEM AND WHAT NEEDS TO BE DONE AS FAR AS HER HEALTH THIS POINT FORWARD.

## 2018-12-19 NOTE — TELEPHONE ENCOUNTER
I told the patient and her family that I would explain everything to her at her 2 week post op visit.

## 2018-12-31 ENCOUNTER — OFFICE VISIT (OUTPATIENT)
Dept: OBSTETRICS AND GYNECOLOGY | Facility: CLINIC | Age: 33
End: 2018-12-31

## 2018-12-31 VITALS
WEIGHT: 249.6 LBS | RESPIRATION RATE: 14 BRPM | BODY MASS INDEX: 42.61 KG/M2 | HEIGHT: 64 IN | SYSTOLIC BLOOD PRESSURE: 140 MMHG | DIASTOLIC BLOOD PRESSURE: 100 MMHG

## 2018-12-31 DIAGNOSIS — Z09 POSTOPERATIVE EXAMINATION: Primary | ICD-10-CM

## 2018-12-31 PROCEDURE — 99024 POSTOP FOLLOW-UP VISIT: CPT | Performed by: OBSTETRICS & GYNECOLOGY

## 2019-01-15 ENCOUNTER — TELEPHONE (OUTPATIENT)
Dept: OBSTETRICS AND GYNECOLOGY | Facility: CLINIC | Age: 34
End: 2019-01-15

## 2019-01-24 ENCOUNTER — TELEPHONE (OUTPATIENT)
Dept: OBSTETRICS AND GYNECOLOGY | Facility: CLINIC | Age: 34
End: 2019-01-24

## 2019-01-24 NOTE — TELEPHONE ENCOUNTER
Dr Arellano    Pt calling and thinks she may be having a miscarriage.      Pt call back [phone]

## 2019-01-24 NOTE — TELEPHONE ENCOUNTER
Spoke with pt, she recently had a DX Lap for endometriosis and is experiencing vaginal bleeding and cramping at the time when her period would be due. Her worry is that she might be having a miscarriage.  Advised pt that without a positive UPT there would be no way to diagnosis pregnancy. Discussed with pt need to phone office back next week with any continued bleeding or cramping, and to be seen in ER for worsening pain or heavy period type bleeding. Pt verbalizes understanding.

## 2019-02-11 ENCOUNTER — OFFICE VISIT (OUTPATIENT)
Dept: INTERNAL MEDICINE | Facility: CLINIC | Age: 34
End: 2019-02-11

## 2019-02-11 VITALS
DIASTOLIC BLOOD PRESSURE: 100 MMHG | BODY MASS INDEX: 44.3 KG/M2 | WEIGHT: 250 LBS | SYSTOLIC BLOOD PRESSURE: 150 MMHG | TEMPERATURE: 98.1 F | HEIGHT: 63 IN | HEART RATE: 82 BPM

## 2019-02-11 DIAGNOSIS — E66.01 MORBID OBESITY WITH BODY MASS INDEX (BMI) OF 40.0 TO 49.9 (HCC): ICD-10-CM

## 2019-02-11 DIAGNOSIS — N91.2 AMENORRHEA: Primary | ICD-10-CM

## 2019-02-11 DIAGNOSIS — R73.03 PREDIABETES: ICD-10-CM

## 2019-02-11 DIAGNOSIS — Z13.220 SCREENING CHOLESTEROL LEVEL: ICD-10-CM

## 2019-02-11 DIAGNOSIS — E28.2 PCOS (POLYCYSTIC OVARIAN SYNDROME): ICD-10-CM

## 2019-02-11 DIAGNOSIS — I10 ESSENTIAL HYPERTENSION: ICD-10-CM

## 2019-02-11 DIAGNOSIS — Z20.2 STD EXPOSURE: ICD-10-CM

## 2019-02-11 DIAGNOSIS — F33.42 RECURRENT MAJOR DEPRESSIVE DISORDER, IN FULL REMISSION (HCC): ICD-10-CM

## 2019-02-11 DIAGNOSIS — J45.20 MILD INTERMITTENT ASTHMA WITHOUT COMPLICATION: ICD-10-CM

## 2019-02-11 LAB
ALBUMIN SERPL-MCNC: 4.38 G/DL (ref 3.2–4.8)
ALBUMIN/GLOB SERPL: 1.8 G/DL (ref 1.5–2.5)
ALP SERPL-CCNC: 89 U/L (ref 25–100)
ALT SERPL W P-5'-P-CCNC: 29 U/L (ref 7–40)
ANION GAP SERPL CALCULATED.3IONS-SCNC: 8 MMOL/L (ref 3–11)
ARTICHOKE IGE QN: 114 MG/DL (ref 0–130)
AST SERPL-CCNC: 28 U/L (ref 0–33)
BASOPHILS # BLD AUTO: 0.03 10*3/MM3 (ref 0–0.2)
BASOPHILS NFR BLD AUTO: 0.5 % (ref 0–1)
BILIRUB SERPL-MCNC: 0.3 MG/DL (ref 0.3–1.2)
BUN BLD-MCNC: 9 MG/DL (ref 9–23)
BUN/CREAT SERPL: 12.5 (ref 7–25)
CALCIUM SPEC-SCNC: 8.8 MG/DL (ref 8.7–10.4)
CHLORIDE SERPL-SCNC: 102 MMOL/L (ref 99–109)
CHOLEST SERPL-MCNC: 168 MG/DL (ref 0–200)
CO2 SERPL-SCNC: 28 MMOL/L (ref 20–31)
CREAT BLD-MCNC: 0.72 MG/DL (ref 0.6–1.3)
DEPRECATED RDW RBC AUTO: 48.4 FL (ref 37–54)
EOSINOPHIL # BLD AUTO: 0.12 10*3/MM3 (ref 0–0.3)
EOSINOPHIL NFR BLD AUTO: 1.9 % (ref 0–3)
ERYTHROCYTE [DISTWIDTH] IN BLOOD BY AUTOMATED COUNT: 15.9 % (ref 11.3–14.5)
GFR SERPL CREATININE-BSD FRML MDRD: 112 ML/MIN/1.73
GLOBULIN UR ELPH-MCNC: 2.4 GM/DL
GLUCOSE BLD-MCNC: 120 MG/DL (ref 70–100)
HCT VFR BLD AUTO: 36.5 % (ref 34.5–44)
HDLC SERPL-MCNC: 49 MG/DL (ref 40–60)
HGB BLD-MCNC: 10.8 G/DL (ref 11.5–15.5)
IMM GRANULOCYTES # BLD AUTO: 0.01 10*3/MM3 (ref 0–0.05)
IMM GRANULOCYTES NFR BLD AUTO: 0.2 % (ref 0–0.6)
LYMPHOCYTES # BLD AUTO: 2.1 10*3/MM3 (ref 0.6–4.8)
LYMPHOCYTES NFR BLD AUTO: 33.9 % (ref 24–44)
MCH RBC QN AUTO: 24.9 PG (ref 27–31)
MCHC RBC AUTO-ENTMCNC: 29.6 G/DL (ref 32–36)
MCV RBC AUTO: 84.3 FL (ref 80–99)
MONOCYTES # BLD AUTO: 0.41 10*3/MM3 (ref 0–1)
MONOCYTES NFR BLD AUTO: 6.6 % (ref 0–12)
NEUTROPHILS # BLD AUTO: 3.54 10*3/MM3 (ref 1.5–8.3)
NEUTROPHILS NFR BLD AUTO: 57.1 % (ref 41–71)
PLATELET # BLD AUTO: 539 10*3/MM3 (ref 150–450)
PMV BLD AUTO: 10.5 FL (ref 6–12)
POTASSIUM BLD-SCNC: 4.3 MMOL/L (ref 3.5–5.5)
PROT SERPL-MCNC: 6.8 G/DL (ref 5.7–8.2)
RBC # BLD AUTO: 4.33 10*6/MM3 (ref 3.89–5.14)
SODIUM BLD-SCNC: 138 MMOL/L (ref 132–146)
TRIGL SERPL-MCNC: 88 MG/DL (ref 0–150)
TSH SERPL DL<=0.05 MIU/L-ACNC: 0.95 MIU/ML (ref 0.35–5.35)
WBC NRBC COR # BLD: 6.2 10*3/MM3 (ref 3.5–10.8)

## 2019-02-11 PROCEDURE — 80061 LIPID PANEL: CPT | Performed by: PHYSICIAN ASSISTANT

## 2019-02-11 PROCEDURE — 85025 COMPLETE CBC W/AUTO DIFF WBC: CPT | Performed by: PHYSICIAN ASSISTANT

## 2019-02-11 PROCEDURE — 84443 ASSAY THYROID STIM HORMONE: CPT | Performed by: PHYSICIAN ASSISTANT

## 2019-02-11 PROCEDURE — 99202 OFFICE O/P NEW SF 15 MIN: CPT | Performed by: PHYSICIAN ASSISTANT

## 2019-02-11 PROCEDURE — 82043 UR ALBUMIN QUANTITATIVE: CPT | Performed by: PHYSICIAN ASSISTANT

## 2019-02-11 PROCEDURE — 80053 COMPREHEN METABOLIC PANEL: CPT | Performed by: PHYSICIAN ASSISTANT

## 2019-02-11 PROCEDURE — 84702 CHORIONIC GONADOTROPIN TEST: CPT | Performed by: PHYSICIAN ASSISTANT

## 2019-02-11 PROCEDURE — 36415 COLL VENOUS BLD VENIPUNCTURE: CPT | Performed by: PHYSICIAN ASSISTANT

## 2019-02-11 RX ORDER — AMLODIPINE BESYLATE 10 MG/1
10 TABLET ORAL DAILY
Qty: 90 TABLET | Refills: 3 | Status: SHIPPED | OUTPATIENT
Start: 2019-02-11 | End: 2020-06-04 | Stop reason: SDUPTHER

## 2019-02-11 NOTE — PROGRESS NOTES
Patient Care Team:  Shana Hernandez PA-C as PCP - General (Internal Medicine)    Chief Complaint;:   Chief Complaint   Patient presents with   • Establish Care     Patient wants to be tested for STD's, check for possible miscarriage. (fasting)        Subjective     HPI    Past Medical History:   Diagnosis Date   • Anemia    • Asthma    • Depression    • Diabetes mellitus (CMS/HCC)    • Hypertension    • Osteoarthritis of back    • Prediabetes        Past Surgical History:   Procedure Laterality Date   • DIAGNOSTIC LAPAROSCOPY  12/13/2018   • EYE SURGERY  2015    eyelid lift   • GALLBLADDER SURGERY  04/2018   • TONSILLECTOMY     • WISDOM TOOTH EXTRACTION         Family History   Problem Relation Age of Onset   • Endometrial cancer Mother 59   • Diabetes Mother    • Hypertension Mother    • Breast cancer Maternal Grandmother 80   • Colon cancer Maternal Grandmother 90   • Arthritis Maternal Grandmother    • Birth defects Maternal Grandmother    • Diabetes Maternal Grandmother    • Breast cancer Maternal Aunt 60   • Diabetes Father    • Hypertension Father    • Diabetes Paternal Grandmother    • Hypertension Paternal Grandmother    • Hypertension Paternal Grandfather        Social History     Socioeconomic History   • Marital status: Single     Spouse name: Not on file   • Number of children: Not on file   • Years of education: Not on file   • Highest education level: Not on file   Social Needs   • Financial resource strain: Not on file   • Food insecurity - worry: Not on file   • Food insecurity - inability: Not on file   • Transportation needs - medical: Not on file   • Transportation needs - non-medical: Not on file   Occupational History   • Not on file   Tobacco Use   • Smoking status: Never Smoker   • Smokeless tobacco: Never Used   Substance and Sexual Activity   • Alcohol use: Yes     Alcohol/week: 1.2 oz     Types: 2 Glasses of wine per week     Frequency: 2-4 times a month     Comment: OCCASIONALLY   • Drug  "use: No   • Sexual activity: Yes     Partners: Male     Birth control/protection: Condom   Other Topics Concern   • Not on file   Social History Narrative   • Not on file       Allergies   Allergen Reactions   • Eggs Or Egg-Derived Products Nausea And Vomiting   • Mold Extract [Trichophyton] Unknown (See Comments)     unknown       Review of Systems:     Review of Systems   Constitutional: Negative for chills, fatigue and fever.   HENT: Negative for congestion, ear pain and sinus pressure.    Respiratory: Negative for cough, chest tightness, shortness of breath and wheezing.    Cardiovascular: Negative for chest pain and palpitations.   Gastrointestinal: Positive for abdominal pain. Negative for blood in stool and constipation.   Skin: Negative for color change.   Allergic/Immunologic: Negative for environmental allergies.   Neurological: Negative for dizziness, speech difficulty and headache.   Psychiatric/Behavioral: Negative for decreased concentration. The patient is not nervous/anxious.        Vital Signs  Vitals:    02/11/19 1027   BP: 150/100   BP Location: Left arm   Patient Position: Sitting   Cuff Size: Large Adult   Pulse: 82   Temp: 98.1 °F (36.7 °C)   TempSrc: Temporal   Weight: 113 kg (250 lb)   Height: 161 cm (63.39\")   PainSc: 0-No pain         Current Outpatient Medications:   •  amLODIPine (NORVASC) 10 MG tablet, Take 10 mg by mouth Daily., Disp: , Rfl:   •  HYDROcodone-ibuprofen (VICOPROFEN) 7.5-200 MG per tablet, Take 1-2 tablets by mouth Every 6 (Six) Hours As Needed, Disp: 25 tablet, Rfl: 0  •  naproxen sodium (ALEVE) 220 MG tablet, Take 220 mg by mouth 2 (Two) Times a Day As Needed., Disp: , Rfl:     Physical Exam:    Physical Exam    Procedures     Results Review:    I reviewed the patient's new clinical results.    Assessment/Plan   Problem List Items Addressed This Visit     None        There are no Patient Instructions on file for this visit.    Plan of care reviewed with patient at the " conclusion of today's visit. Education was provided regarding diagnosis, management, and any prescribed or recommended OTC medications.Patient verbalizes understanding of and agreement with management plan.     Mary Neumann MA

## 2019-02-11 NOTE — PROGRESS NOTES
Patient Care Team:  Shana Hernandez PA-C as PCP - General (Internal Medicine)    Chief Complaint;:   Chief Complaint   Patient presents with   • Establish Care     Patient wants to be tested for STD's, check for possible miscarriage. (fasting)        Subjective     HPI  33-year-old black female presents to the office today to establish care.  She had been a patient of Dr. Radha Kirk but she has not seen her for about 2 years.  She is fasting for labs today.  She does suffer from hypertension poorly controlled although she does admit to not remembering her medications regularly.,  She also has a history of prediabetes.  She suffered from depression in the remote past but stable at this time.  She's also had episodes of asthma which are currently under control.  She works in the childcare industry.  She has a 3-year-old child.    She is wanting to be checked for STDs today.  She had one episode of unprotected sex in the recent past.  She's also concerned that she may have had a miscarriage she had some brown discharge last week which usually precedes her menstrual period which she's not had any regular menstrual bleeding.    She is morbidly obese and is interested in weight loss.  Past Medical History:   Diagnosis Date   • Anemia    • Asthma    • Depression    • Diabetes mellitus (CMS/HCC)    • Hypertension    • Osteoarthritis of back    • Prediabetes        Past Surgical History:   Procedure Laterality Date   • DIAGNOSTIC LAPAROSCOPY  12/13/2018   • EYE SURGERY  2015    eyelid lift   • GALLBLADDER SURGERY  04/2018   • TONSILLECTOMY     • WISDOM TOOTH EXTRACTION         Family History   Problem Relation Age of Onset   • Endometrial cancer Mother 59   • Diabetes Mother    • Hypertension Mother    • Breast cancer Maternal Grandmother 80   • Colon cancer Maternal Grandmother 90   • Arthritis Maternal Grandmother    • Birth defects Maternal Grandmother    • Diabetes Maternal Grandmother    • Breast cancer Maternal  "Aunt 60   • Diabetes Father    • Hypertension Father    • Diabetes Paternal Grandmother    • Hypertension Paternal Grandmother    • Hypertension Paternal Grandfather        Social History     Socioeconomic History   • Marital status: Single     Spouse name: Not on file   • Number of children: Not on file   • Years of education: Not on file   • Highest education level: Not on file   Social Needs   • Financial resource strain: Not on file   • Food insecurity - worry: Not on file   • Food insecurity - inability: Not on file   • Transportation needs - medical: Not on file   • Transportation needs - non-medical: Not on file   Occupational History   • Not on file   Tobacco Use   • Smoking status: Never Smoker   • Smokeless tobacco: Never Used   Substance and Sexual Activity   • Alcohol use: Yes     Alcohol/week: 1.2 oz     Types: 2 Glasses of wine per week     Frequency: 2-4 times a month     Comment: OCCASIONALLY   • Drug use: No   • Sexual activity: Yes     Partners: Male     Birth control/protection: Condom   Other Topics Concern   • Not on file   Social History Narrative   • Not on file       Allergies   Allergen Reactions   • Eggs Or Egg-Derived Products Nausea And Vomiting   • Mold Extract [Trichophyton] Unknown (See Comments)     unknown       Review of Systems:     Review of Systems   Constitutional: Negative.    HENT: Negative.    Respiratory: Negative.    Cardiovascular: Negative.    Gastrointestinal: Negative.    Genitourinary: Positive for menstrual problem and vaginal bleeding. Negative for genital sores and vaginal discharge.   Neurological: Negative.    Hematological: Negative.    Psychiatric/Behavioral: Negative.        Vital Signs  Vitals:    02/11/19 1027   BP: 150/100   BP Location: Left arm   Patient Position: Sitting   Cuff Size: Large Adult   Pulse: 82   Temp: 98.1 °F (36.7 °C)   TempSrc: Temporal   Weight: 113 kg (250 lb)   Height: 161 cm (63.39\")   PainSc: 0-No pain         Current Outpatient " Medications:   •  amLODIPine (NORVASC) 10 MG tablet, Take 10 mg by mouth Daily., Disp: , Rfl:   •  HYDROcodone-ibuprofen (VICOPROFEN) 7.5-200 MG per tablet, Take 1-2 tablets by mouth Every 6 (Six) Hours As Needed, Disp: 25 tablet, Rfl: 0  •  naproxen sodium (ALEVE) 220 MG tablet, Take 220 mg by mouth 2 (Two) Times a Day As Needed., Disp: , Rfl:     Physical Exam:    Physical Exam   Constitutional: She is oriented to person, place, and time. She appears well-developed. No distress.   Morbidly obese   HENT:   Head: Normocephalic and atraumatic.   Right Ear: External ear normal.   Left Ear: External ear normal.   Mouth/Throat: Oropharynx is clear and moist.   Neck: Neck supple. No thyromegaly present.   Cardiovascular: Normal rate, regular rhythm and normal heart sounds.   Pulmonary/Chest: Effort normal and breath sounds normal. She has no wheezes.   Abdominal: Soft. Bowel sounds are normal.   Musculoskeletal: Normal range of motion.   Lymphadenopathy:     She has no cervical adenopathy.   Neurological: She is alert and oriented to person, place, and time.   Skin: Skin is warm and dry. She is not diaphoretic.   Psychiatric: She has a normal mood and affect. Her behavior is normal. Judgment and thought content normal.   Nursing note and vitals reviewed.      Procedures     Results Review:    I reviewed the patient's new clinical results.    Assessment/Plan   Problem List Items Addressed This Visit        Cardiovascular and Mediastinum    Essential hypertension    Relevant Medications    amLODIPine (NORVASC) 10 MG tablet    Other Relevant Orders    Comprehensive metabolic panel    Lipid panel    TSH    CBC w AUTO Differential    MicroAlbumin, Urine, Random - Urine, Clean Catch    CBC Auto Differential       Respiratory    Mild intermittent asthma without complication       Digestive    Morbid obesity with body mass index (BMI) of 40.0 to 49.9 (CMS/[facility])    Relevant Orders    Comprehensive metabolic panel    Lipid panel     TSH    CBC w AUTO Differential    MicroAlbumin, Urine, Random - Urine, Clean Catch    CBC Auto Differential       Endocrine    PCOS (polycystic ovarian syndrome)       Other    Prediabetes    Recurrent major depressive disorder, in full remission (CMS/[facility])      Other Visit Diagnoses     Amenorrhea    -  Primary    Check Beta HCG    Relevant Orders    Comprehensive metabolic panel    Lipid panel    TSH    CBC w AUTO Differential    MicroAlbumin, Urine, Random - Urine, Clean Catch    hCG, Serum, Qualitative    CBC Auto Differential    OneSwab - Kit, Vagina    Screening cholesterol level        Check fasting labs    Relevant Orders    Lipid panel    STD exposure        Check vaginal cultures        Patient Instructions   Check fasting labs.  Take amilodipine everyday encouraged her to check her blood pressure periodically.  Follow up in 2 months for BP check and weight loss.    Discussed a low carb high protein diet with the patient with the goal of 150 minutes of exercise per week.  We'll check her progress in 2 months.    We'll get records from Dr. Radha Kirk.    Vaginal cultures obtained.  She should practice safe sexual habits.      Plan of care reviewed with patient at the conclusion of today's visit. Education was provided regarding diagnosis, management, and any prescribed or recommended OTC medications.Patient verbalizes understanding of and agreement with management plan.     Shana Hernandez PA-C

## 2019-02-11 NOTE — PATIENT INSTRUCTIONS
Check fasting labs.  Take amilodipine everyday encouraged her to check her blood pressure periodically.  Follow up in 2 months for BP check and weight loss.    Discussed a low carb high protein diet with the patient with the goal of 150 minutes of exercise per week.  We'll check her progress in 2 months.    We'll get records from Dr. Radha Kirk.    Vaginal cultures obtained.  She should practice safe sexual habits.

## 2019-02-12 ENCOUNTER — TELEPHONE (OUTPATIENT)
Dept: INTERNAL MEDICINE | Facility: CLINIC | Age: 34
End: 2019-02-12

## 2019-02-12 LAB — HCG INTACT+B SERPL-ACNC: <5 MIU/ML

## 2019-02-12 NOTE — TELEPHONE ENCOUNTER
Pt is medicaid and our physicians are not authorized just yet for prescriptions. Received a PA for amlodipine. I was advised by Chanell that patients have to pay out of pocket or with a coupon. Called [facility] pharmacy and spoke with pharmacist Theodore. Advised him of situation and gave him goodrx coupon. #90 $11.81 ID: [id number] Group: [id number] Bin: [id number] PCN: [id number]. Advised pt through [employer].

## 2019-02-13 LAB — MICROALBUMIN UR-MCNC: 26.2 UG/ML

## 2019-02-15 ENCOUNTER — TELEPHONE (OUTPATIENT)
Dept: INTERNAL MEDICINE | Facility: CLINIC | Age: 34
End: 2019-02-15

## 2019-02-15 RX ORDER — AZITHROMYCIN 250 MG/1
TABLET, FILM COATED ORAL
Qty: 6 TABLET | Refills: 0 | Status: SHIPPED | OUTPATIENT
Start: 2019-02-15 | End: 2019-03-29

## 2019-02-15 NOTE — TELEPHONE ENCOUNTER
CALLED PT. LEFT VM TO RETURN CALL         ----- Message from Shana Hernandez PA-C sent at 2/15/2019  2:20 PM EST -----  Call pt, tell her vaginal culture showed chlamydia.   I have sent in Zpak which should cover.

## 2019-02-16 ENCOUNTER — RESULTS ENCOUNTER (OUTPATIENT)
Dept: INTERNAL MEDICINE | Facility: CLINIC | Age: 34
End: 2019-02-16

## 2019-02-16 DIAGNOSIS — N91.2 AMENORRHEA: ICD-10-CM

## 2019-02-27 NOTE — PROGRESS NOTES
Hannah  Deanna Putnam  : 1985  MRN: [id number]  CSN: [id number]    History and Physical    Subjective   Deanna Putnam is a 33 y.o. year old  who presents for diagnostic laparoscopy with chromopertubation surgery due to chronic pelvic pain and menorrhagia.  She has no acute complaints.    Patient Active Problem List   Diagnosis   • Intramural and subserous leiomyoma of uterus   • Essential hypertension   • Morbid obesity with body mass index (BMI) of 40.0 to 49.9 (CMS/HCC)   • Prediabetes   • Recurrent major depressive disorder, in full remission (CMS/HCC)   • Mild intermittent asthma without complication   • PCOS (polycystic ovarian syndrome)     Past Medical History:   Diagnosis Date   • Anemia    • Asthma    • Depression    • Diabetes mellitus (CMS/HCC)    • Hypertension    • Osteoarthritis of back    • Prediabetes      Past Surgical History:   Procedure Laterality Date   • DIAGNOSTIC LAPAROSCOPY  2018   • EYE SURGERY  2015    eyelid lift   • GALLBLADDER SURGERY  2018   • TONSILLECTOMY     • WISDOM TOOTH EXTRACTION       Social History     Socioeconomic History   • Marital status: Single     Spouse name: Not on file   • Number of children: Not on file   • Years of education: Not on file   • Highest education level: Not on file   Tobacco Use   • Smoking status: Never Smoker   • Smokeless tobacco: Never Used   Substance and Sexual Activity   • Alcohol use: Yes     Alcohol/week: 1.2 oz     Types: 2 Glasses of wine per week     Frequency: 2-4 times a month     Comment: OCCASIONALLY   • Drug use: No   • Sexual activity: Yes     Partners: Male     Birth control/protection: Condom       Current Outpatient Medications:   •  naproxen sodium (ALEVE) 220 MG tablet, Take 220 mg by mouth 2 (Two) Times a Day As Needed., Disp: , Rfl:   •  amLODIPine (NORVASC) 10 MG tablet, Take 1 tablet by mouth Daily., Disp: 90 tablet, Rfl: 3  •  azithromycin (ZITHROMAX Z-KRUPA) 250 MG tablet, Take 2 tablets  "the first day, then 1 tablet daily for 4 days., Disp: 6 tablet, Rfl: 0  •  HYDROcodone-ibuprofen (VICOPROFEN) 7.5-200 MG per tablet, Take 1-2 tablets by mouth Every 6 (Six) Hours As Needed, Disp: 25 tablet, Rfl: 0    Allergies   Allergen Reactions   • Eggs Or Egg-Derived Products Nausea And Vomiting   • Mold Extract [Trichophyton] Unknown (See Comments)     unknown       Review of Systems      Objective   /90   Resp 14   Ht 162.6 cm (64\")   Wt 115 kg (252 lb 12.8 oz)   LMP 12/10/2018 (Exact Date)   Breastfeeding? No   BMI 43.39 kg/m²   General: well developed; well nourished  no acute distress   Heart: Not performed.   Lungs: breathing is unlabored   Abdomen: soft, non-tender; no masses  no umbilical or inguinal hernias are present  no hepato-splenomegaly   Pelvis:: Clinical staff was present for exam  External genitalia:  normal appearance of the external genitalia including Bartholin's and Castalia's glands.  :  urethral meatus normal;  Vaginal:  normal pink mucosa without prolapse or lesions.  Cervix:  normal appearance.  Uterus:  normal size, shape and consistency. tenderness to palpation is present;  Adnexa:  tenderness of the bilateral adnexa to  superficial and deep palpation;   Labs  Lab Results   Component Value Date     (H) 02/11/2019    HGB 10.8 (L) 02/11/2019    HCT 36.5 02/11/2019    WBC 6.20 02/11/2019     02/11/2019    K 4.3 02/11/2019     02/11/2019    CO2 28.0 02/11/2019    BUN 9 02/11/2019    CREATININE 0.72 02/11/2019    GLUCOSE 120 (H) 02/11/2019    ALBUMIN 4.38 02/11/2019    CALCIUM 8.8 02/11/2019    AST 28 02/11/2019    ALT 29 02/11/2019    BILITOT 0.3 02/11/2019        Assessment   1. Chronic pelvic pain  2. Menorrhagia     Plan   1. Today I discussed with Deanna the risks of her upcoming surgical procedure. Risks including intraoperative bleeding, infection at the site of surgery, damage to the adjacent surrounding organs, catheter introduced urinary tract " infections and the small risk for deep vein thrombosis were all explained. Additionally, the small risk for reoperation in the event of unanticipated bleeding or surgical injury was discussed.  All of her questions were answered fully.  She left with a very clear understanding of the preoperative surgical indications and the nature of the surgery for which she is scheduled.  She understands to be NPO after midnight and to be at the preoperative area at least 1-1/2 hours prior to the scheduled surgical start time.      Jorge Alberto Arellano MD AUGIE  2/26/2019  11:47 PM

## 2019-03-21 ENCOUNTER — HOSPITAL ENCOUNTER (EMERGENCY)
Facility: HOSPITAL | Age: 34
Discharge: HOME | End: 2019-03-21
Attending: EMERGENCY MEDICINE | Admitting: EMERGENCY MEDICINE

## 2019-03-21 ENCOUNTER — OFFICE VISIT (OUTPATIENT)
Dept: INTERNAL MEDICINE | Facility: CLINIC | Age: 34
End: 2019-03-21

## 2019-03-21 ENCOUNTER — APPOINTMENT (OUTPATIENT)
Dept: GENERAL RADIOLOGY | Facility: HOSPITAL | Age: 34
End: 2019-03-21

## 2019-03-21 VITALS
TEMPERATURE: 97.9 F | BODY MASS INDEX: 45 KG/M2 | DIASTOLIC BLOOD PRESSURE: 91 MMHG | OXYGEN SATURATION: 100 % | WEIGHT: 254 LBS | HEART RATE: 74 BPM | RESPIRATION RATE: 18 BRPM | SYSTOLIC BLOOD PRESSURE: 143 MMHG | HEIGHT: 63 IN

## 2019-03-21 VITALS
WEIGHT: 254 LBS | DIASTOLIC BLOOD PRESSURE: 90 MMHG | HEART RATE: 76 BPM | HEIGHT: 63 IN | BODY MASS INDEX: 45 KG/M2 | SYSTOLIC BLOOD PRESSURE: 132 MMHG | OXYGEN SATURATION: 98 % | TEMPERATURE: 97.1 F

## 2019-03-21 DIAGNOSIS — R06.02 SHORTNESS OF BREATH: ICD-10-CM

## 2019-03-21 DIAGNOSIS — Z86.79 HISTORY OF HYPERTENSION: ICD-10-CM

## 2019-03-21 DIAGNOSIS — R09.1 PLEURISY: Primary | ICD-10-CM

## 2019-03-21 DIAGNOSIS — I51.7 CARDIOMEGALY: Primary | ICD-10-CM

## 2019-03-21 DIAGNOSIS — M79.89 LEFT LEG SWELLING: ICD-10-CM

## 2019-03-21 DIAGNOSIS — Z87.09 HISTORY OF PLEURISY: ICD-10-CM

## 2019-03-21 LAB
ALBUMIN SERPL-MCNC: 4.38 G/DL (ref 3.2–4.8)
ALBUMIN/GLOB SERPL: 1.7 G/DL (ref 1.5–2.5)
ALP SERPL-CCNC: 73 U/L (ref 25–100)
ALT SERPL W P-5'-P-CCNC: 21 U/L (ref 7–40)
ANION GAP SERPL CALCULATED.3IONS-SCNC: 11 MMOL/L (ref 3–11)
AST SERPL-CCNC: 19 U/L (ref 0–33)
BASOPHILS # BLD AUTO: 0.04 10*3/MM3 (ref 0–0.2)
BASOPHILS NFR BLD AUTO: 0.5 % (ref 0–1)
BILIRUB SERPL-MCNC: 0.4 MG/DL (ref 0.3–1.2)
BNP SERPL-MCNC: 4 PG/ML (ref 0–100)
BUN BLD-MCNC: 14 MG/DL (ref 9–23)
BUN/CREAT SERPL: 18.2 (ref 7–25)
CALCIUM SPEC-SCNC: 9.3 MG/DL (ref 8.7–10.4)
CHLORIDE SERPL-SCNC: 106 MMOL/L (ref 99–109)
CO2 SERPL-SCNC: 23 MMOL/L (ref 20–31)
CREAT BLD-MCNC: 0.77 MG/DL (ref 0.6–1.3)
D DIMER PPP FEU-MCNC: 0.35 MCGFEU/ML (ref 0–0.56)
DEPRECATED RDW RBC AUTO: 46.3 FL (ref 37–54)
EOSINOPHIL # BLD AUTO: 0.15 10*3/MM3 (ref 0–0.3)
EOSINOPHIL NFR BLD AUTO: 2 % (ref 0–3)
ERYTHROCYTE [DISTWIDTH] IN BLOOD BY AUTOMATED COUNT: 15.8 % (ref 11.3–14.5)
GFR SERPL CREATININE-BSD FRML MDRD: 103 ML/MIN/1.73
GLOBULIN UR ELPH-MCNC: 2.5 GM/DL
GLUCOSE BLD-MCNC: 103 MG/DL (ref 70–100)
HCT VFR BLD AUTO: 33.8 % (ref 34.5–44)
HGB BLD-MCNC: 10.3 G/DL (ref 11.5–15.5)
HOLD SPECIMEN: NORMAL
HOLD SPECIMEN: NORMAL
IMM GRANULOCYTES # BLD AUTO: 0.02 10*3/MM3 (ref 0–0.05)
IMM GRANULOCYTES NFR BLD AUTO: 0.3 % (ref 0–0.6)
LIPASE SERPL-CCNC: 64 U/L (ref 6–51)
LYMPHOCYTES # BLD AUTO: 3.01 10*3/MM3 (ref 0.6–4.8)
LYMPHOCYTES NFR BLD AUTO: 39.5 % (ref 24–44)
MCH RBC QN AUTO: 24.8 PG (ref 27–31)
MCHC RBC AUTO-ENTMCNC: 30.5 G/DL (ref 32–36)
MCV RBC AUTO: 81.3 FL (ref 80–99)
MONOCYTES # BLD AUTO: 0.45 10*3/MM3 (ref 0–1)
MONOCYTES NFR BLD AUTO: 5.9 % (ref 0–12)
NEUTROPHILS # BLD AUTO: 3.97 10*3/MM3 (ref 1.5–8.3)
NEUTROPHILS NFR BLD AUTO: 52.1 % (ref 41–71)
PLATELET # BLD AUTO: 521 10*3/MM3 (ref 150–450)
PMV BLD AUTO: 10.1 FL (ref 6–12)
POTASSIUM BLD-SCNC: 3.7 MMOL/L (ref 3.5–5.5)
PROT SERPL-MCNC: 6.9 G/DL (ref 5.7–8.2)
RBC # BLD AUTO: 4.16 10*6/MM3 (ref 3.89–5.14)
SODIUM BLD-SCNC: 140 MMOL/L (ref 132–146)
TROPONIN I SERPL-MCNC: 0 NG/ML (ref 0–0.07)
WBC NRBC COR # BLD: 7.62 10*3/MM3 (ref 3.5–10.8)
WHOLE BLOOD HOLD SPECIMEN: NORMAL
WHOLE BLOOD HOLD SPECIMEN: NORMAL

## 2019-03-21 PROCEDURE — 71045 X-RAY EXAM CHEST 1 VIEW: CPT

## 2019-03-21 PROCEDURE — 83880 ASSAY OF NATRIURETIC PEPTIDE: CPT | Performed by: EMERGENCY MEDICINE

## 2019-03-21 PROCEDURE — 80053 COMPREHEN METABOLIC PANEL: CPT

## 2019-03-21 PROCEDURE — 99284 EMERGENCY DEPT VISIT MOD MDM: CPT

## 2019-03-21 PROCEDURE — 84484 ASSAY OF TROPONIN QUANT: CPT

## 2019-03-21 PROCEDURE — 93005 ELECTROCARDIOGRAM TRACING: CPT

## 2019-03-21 PROCEDURE — 85379 FIBRIN DEGRADATION QUANT: CPT

## 2019-03-21 PROCEDURE — 93005 ELECTROCARDIOGRAM TRACING: CPT | Performed by: EMERGENCY MEDICINE

## 2019-03-21 PROCEDURE — 83690 ASSAY OF LIPASE: CPT

## 2019-03-21 PROCEDURE — 85025 COMPLETE CBC W/AUTO DIFF WBC: CPT

## 2019-03-21 PROCEDURE — 99213 OFFICE O/P EST LOW 20 MIN: CPT | Performed by: PHYSICIAN ASSISTANT

## 2019-03-21 RX ORDER — ASPIRIN 81 MG/1
324 TABLET, CHEWABLE ORAL ONCE
Status: DISCONTINUED | OUTPATIENT
Start: 2019-03-21 | End: 2019-03-21 | Stop reason: HOSPADM

## 2019-03-21 RX ORDER — SODIUM CHLORIDE 0.9 % (FLUSH) 0.9 %
10 SYRINGE (ML) INJECTION AS NEEDED
Status: DISCONTINUED | OUTPATIENT
Start: 2019-03-21 | End: 2019-03-21 | Stop reason: HOSPADM

## 2019-03-21 RX ORDER — IBUPROFEN 800 MG/1
800 TABLET ORAL EVERY 6 HOURS PRN
COMMUNITY
End: 2019-04-12 | Stop reason: SDUPTHER

## 2019-03-21 NOTE — PROGRESS NOTES
"Patient Care Team:  Shana Hernandez PA-C as PCP - General (Internal Medicine)    Chief Complaint;:   Chief Complaint   Patient presents with   • Shortness of Breath     [facility] follow up for pleurisy        Subjective     HPI  32 yo black female presents with 2 day history of pleuritic chest pain, SOB and LLE swelling.   She went to [facility], had CXR and EKG.  She has been taking 800 ibuprofen which has helped chest discomfort.  Past Medical History:   Diagnosis Date   • Anemia    • Asthma    • Depression    • Diabetes mellitus (CMS/[facility])    • Hypertension    • Osteoarthritis of back    • Prediabetes        Social History     Socioeconomic History   • Marital status: Single     Spouse name: Not on file   • Number of children: Not on file   • Years of education: Not on file   • Highest education level: Not on file   Tobacco Use   • Smoking status: Never Smoker   • Smokeless tobacco: Never Used   Substance and Sexual Activity   • Alcohol use: Yes     Alcohol/week: 1.2 oz     Types: 2 Glasses of wine per week     Frequency: 2-4 times a month     Comment: OCCASIONALLY   • Drug use: No   • Sexual activity: Yes     Partners: Male     Birth control/protection: Condom       Allergies   Allergen Reactions   • Eggs Or Egg-Derived Products Nausea And Vomiting   • Mold Extract [Trichophyton] Unknown (See Comments)     unknown       Review of Systems:     Review of Systems   Constitutional: Negative.    Respiratory: Positive for shortness of breath.    Cardiovascular: Positive for chest pain and leg swelling.       Vital Signs  Vitals:    03/21/19 1251   BP: 132/90   BP Location: Left arm   Patient Position: Sitting   Cuff Size: Adult   Pulse: 76   Temp: 97.1 °F (36.2 °C)   TempSrc: Temporal   SpO2: 98%   Weight: 115 kg (254 lb)   Height: 161 cm (63.39\")         Current Outpatient Medications:   •  amLODIPine (NORVASC) 10 MG tablet, Take 1 tablet by mouth Daily., Disp: 90 tablet, Rfl: 3  •  HYDROcodone-ibuprofen (VICOPROFEN) 7.5-200 MG " per tablet, Take 1-2 tablets by mouth Every 6 (Six) Hours As Needed, Disp: 25 tablet, Rfl: 0  •  ibuprofen (ADVIL,MOTRIN) 800 MG tablet, Take 800 mg by mouth Every 6 (Six) Hours As Needed for Mild Pain ., Disp: , Rfl:   •  azithromycin (ZITHROMAX Z-KRUPA) 250 MG tablet, Take 2 tablets the first day, then 1 tablet daily for 4 days., Disp: 6 tablet, Rfl: 0  •  naproxen sodium (ALEVE) 220 MG tablet, Take 220 mg by mouth 2 (Two) Times a Day As Needed., Disp: , Rfl:   No current facility-administered medications for this visit.     Physical Exam:    Physical Exam   Constitutional: She is oriented to person, place, and time. She appears well-developed and well-nourished.   Cardiovascular: Normal rate, regular rhythm and normal heart sounds.   Pulmonary/Chest: Effort normal and breath sounds normal.   Musculoskeletal:   2+ pitting edema LLE, no redness or warmth   Neurological: She is alert and oriented to person, place, and time.   Nursing note and vitals reviewed.      Procedures      Assessment/Plan   Problem List Items Addressed This Visit     None      Visit Diagnoses     Pleurisy    -  Primary    Left leg swelling        Shortness of breath            Patient Instructions   Pt has Medicaid, we are not yet providers.   She needs venous doppler LLE and probable CT of chest to assess for PE.   She was sent to ER.          Plan of care reviewed with patient at the conclusion of today's visit. Education was provided regarding diagnosis, management, and any prescribed or recommended OTC medications.Patient verbalizes understanding of and agreement with management plan.     Shana Hernandez PA-C

## 2019-03-22 NOTE — ED PROVIDER NOTES
"Subjective   Patient presents to the emergency department out of concern about recent discovery of cardiomegaly on a chest xray.  Patient presented 2 days ago to an urgent treatment center for pain in her back with deep breathing.  A chest x-ray was taken and she was told that \"I have an enlarged heart\".  Patient has been taking ibuprofen 800 mg for control of pleuritic chest pain she was diagnosed with.  Since then, she states that she has shortness of breath with exertion, more chest discomfort, and she thinks her legs are swollen.  Patient has a past medical history of hypertension.  She takes amlodipine daily.        History provided by:  Patient   used: No    Chest Pain   Pain location:  Unable to specify  Pain quality: aching    Pain radiates to:  Does not radiate  Pain severity:  Mild  Onset quality:  Gradual  Duration:  3 days  Timing:  Intermittent  Progression:  Improving  Chronicity:  New  Context: breathing    Relieved by: ibuprofen.  Worsened by:  Exertion and deep breathing  Associated symptoms: back pain, dizziness, lower extremity edema (ankle swelling) and shortness of breath    Associated symptoms: no abdominal pain, no AICD problem, no altered mental status, no anorexia, no anxiety, no claudication, no cough, no numbness, no orthopnea, no palpitations, no vomiting and no weakness    Risk factors: diabetes mellitus, hypertension and obesity    Risk factors: no coronary artery disease, not male, no Marfan's syndrome, no prior DVT/PE and no smoking        Review of Systems   Respiratory: Positive for shortness of breath. Negative for cough, chest tightness and wheezing.    Cardiovascular: Positive for chest pain. Negative for palpitations, orthopnea and claudication. Leg swelling: ankle swelling    Gastrointestinal: Negative for abdominal pain, anorexia and vomiting.   Genitourinary: Negative for decreased urine volume.   Musculoskeletal: Positive for back pain. "   Allergic/Immunologic: Negative.    Neurological: Positive for dizziness. Negative for weakness and numbness.   Hematological: Negative.    Psychiatric/Behavioral: The patient is nervous/anxious.    All other systems reviewed and are negative.      Past Medical History:   Diagnosis Date   • Anemia    • Asthma    • Depression    • Diabetes mellitus (CMS/HCC)    • Hypertension    • Osteoarthritis of back    • Prediabetes        Allergies   Allergen Reactions   • Eggs Or Egg-Derived Products Nausea And Vomiting   • Mold Extract [Trichophyton] Unknown (See Comments)     unknown       Past Surgical History:   Procedure Laterality Date   • DIAGNOSTIC LAPAROSCOPY  12/13/2018   • EYE SURGERY  2015    eyelid lift   • GALLBLADDER SURGERY  04/2018   • TONSILLECTOMY     • WISDOM TOOTH EXTRACTION         Family History   Problem Relation Age of Onset   • Endometrial cancer Mother 59   • Diabetes Mother    • Hypertension Mother    • Breast cancer Maternal Grandmother 80   • Colon cancer Maternal Grandmother 90   • Arthritis Maternal Grandmother    • Birth defects Maternal Grandmother    • Diabetes Maternal Grandmother    • Breast cancer Maternal Aunt 60   • Diabetes Father    • Hypertension Father    • Diabetes Paternal Grandmother    • Hypertension Paternal Grandmother    • Hypertension Paternal Grandfather        Social History     Socioeconomic History   • Marital status: Single     Spouse name: Not on file   • Number of children: Not on file   • Years of education: Not on file   • Highest education level: Not on file   Tobacco Use   • Smoking status: Never Smoker   • Smokeless tobacco: Never Used   Substance and Sexual Activity   • Alcohol use: Yes     Alcohol/week: 1.2 oz     Types: 2 Glasses of wine per week     Frequency: 2-4 times a month     Comment: OCCASIONALLY   • Drug use: No   • Sexual activity: Yes     Partners: Male     Birth control/protection: Condom           Objective   Physical Exam   Constitutional: She is  oriented to person, place, and time. She appears well-developed and well-nourished.  Non-toxic appearance. She does not appear ill. No distress.   HENT:   Head: Normocephalic and atraumatic.   Eyes: EOM are normal. Pupils are equal, round, and reactive to light.   Neck: Normal range of motion. Neck supple. No JVD present.   Cardiovascular: Normal rate, regular rhythm and normal pulses. Exam reveals no S3 and no S4.   Pulmonary/Chest: Effort normal. No accessory muscle usage. No tachypnea. She has no decreased breath sounds. She has no wheezes. She has no rales.   Abdominal: Soft. Bowel sounds are normal. She exhibits no distension. There is no rebound and no guarding.   Musculoskeletal: Normal range of motion. She exhibits no edema.        Right lower leg: Normal. She exhibits no edema.        Left lower leg: Normal. She exhibits no edema.   Lymphadenopathy:     She has no cervical adenopathy.   Neurological: She is alert and oriented to person, place, and time.   Skin: Skin is warm and dry. Capillary refill takes less than 2 seconds.   Psychiatric: Her behavior is normal. Her mood appears anxious. She is not agitated.   Nursing note and vitals reviewed.      Procedures           ED Course      No results found for this or any previous visit (from the past 24 hour(s)).  Note: In addition to lab results from this visit, the labs listed above may include labs taken at another facility or during a different encounter within the last 24 hours. Please correlate lab times with ED admission and discharge times for further clarification of the services performed during this visit.    XR Chest 1 View   Final Result   No active lung parenchymal lesion.       THIS DOCUMENT HAS BEEN ELECTRONICALLY SIGNED BY PAULA KRUEGER MD        Vitals:    03/21/19 1801 03/21/19 2104 03/21/19 2107 03/21/19 2130   BP: 171/89 141/98  143/91   BP Location: Left arm      Patient Position: Sitting      Pulse: 94  73 74   Resp: 18      Temp: 97.9 °F  "(36.6 °C)      TempSrc: Oral      SpO2: 100%  100% 100%   Weight: 115 kg (254 lb)      Height: 160 cm (63\")        Medications - No data to display  ECG/EMG Results (last 24 hours)     ** No results found for the last 24 hours. **        ECG 12 Lead                         MDM      Final diagnoses:   Cardiomegaly   History of hypertension   History of pleurisy            Felisa Moon, APRN  03/24/19 0546    "

## 2019-03-22 NOTE — DISCHARGE INSTRUCTIONS
Continue taking your blood pressure medication as directed.  Take the motrin/ibuprofen for pleuritic pain. Best of luck on your weight loss journey.  Thank you

## 2019-03-29 ENCOUNTER — OFFICE VISIT (OUTPATIENT)
Dept: OBSTETRICS AND GYNECOLOGY | Facility: CLINIC | Age: 34
End: 2019-03-29

## 2019-03-29 VITALS
WEIGHT: 249.6 LBS | SYSTOLIC BLOOD PRESSURE: 142 MMHG | DIASTOLIC BLOOD PRESSURE: 100 MMHG | BODY MASS INDEX: 44.23 KG/M2 | HEIGHT: 63 IN | RESPIRATION RATE: 14 BRPM

## 2019-03-29 DIAGNOSIS — E66.01 MORBID OBESITY WITH BODY MASS INDEX (BMI) OF 40.0 TO 49.9 (HCC): Primary | ICD-10-CM

## 2019-03-29 PROCEDURE — 99213 OFFICE O/P EST LOW 20 MIN: CPT | Performed by: OBSTETRICS & GYNECOLOGY

## 2019-03-29 RX ORDER — FUROSEMIDE 20 MG/1
10 TABLET ORAL 2 TIMES DAILY
Qty: 30 TABLET | Refills: 2
Start: 2019-03-29 | End: 2019-04-03

## 2019-04-03 ENCOUNTER — TELEPHONE (OUTPATIENT)
Dept: OBSTETRICS AND GYNECOLOGY | Facility: CLINIC | Age: 34
End: 2019-04-03

## 2019-04-03 NOTE — TELEPHONE ENCOUNTER
Adan magana called stating her pharmacy has not received the RX for Lasix that was prescribed for her on Friday. Please send to [facility]

## 2019-04-04 RX ORDER — FUROSEMIDE 20 MG/1
20 TABLET ORAL DAILY
Qty: 90 TABLET | Refills: 3 | Status: SHIPPED | OUTPATIENT
Start: 2019-04-04 | End: 2020-03-02

## 2019-04-04 NOTE — TELEPHONE ENCOUNTER
Per Dr. Arellano change the prescription from 10mg 1/2 tab to 20 mg once daily. E-Rx sent to [facility] on "[employer].

## 2019-04-12 ENCOUNTER — LAB (OUTPATIENT)
Dept: LAB | Facility: HOSPITAL | Age: 34
End: 2019-04-12

## 2019-04-12 ENCOUNTER — OFFICE VISIT (OUTPATIENT)
Dept: INTERNAL MEDICINE | Facility: CLINIC | Age: 34
End: 2019-04-12

## 2019-04-12 VITALS
DIASTOLIC BLOOD PRESSURE: 62 MMHG | WEIGHT: 252 LBS | SYSTOLIC BLOOD PRESSURE: 124 MMHG | HEART RATE: 80 BPM | BODY MASS INDEX: 44.64 KG/M2

## 2019-04-12 DIAGNOSIS — E66.01 MORBID OBESITY WITH BODY MASS INDEX (BMI) OF 40.0 TO 49.9 (HCC): ICD-10-CM

## 2019-04-12 DIAGNOSIS — I10 ESSENTIAL HYPERTENSION: Primary | ICD-10-CM

## 2019-04-12 DIAGNOSIS — J45.20 MILD INTERMITTENT ASTHMA WITHOUT COMPLICATION: ICD-10-CM

## 2019-04-12 DIAGNOSIS — I10 ESSENTIAL HYPERTENSION: ICD-10-CM

## 2019-04-12 DIAGNOSIS — R73.03 PREDIABETES: ICD-10-CM

## 2019-04-12 LAB
ALBUMIN UR-MCNC: <1.2 MG/L
BASOPHILS # BLD AUTO: 0.04 10*3/MM3 (ref 0–0.2)
BASOPHILS NFR BLD AUTO: 0.5 % (ref 0–1.5)
DEPRECATED RDW RBC AUTO: 51.3 FL (ref 37–54)
EOSINOPHIL # BLD AUTO: 0.18 10*3/MM3 (ref 0–0.4)
EOSINOPHIL NFR BLD AUTO: 2.4 % (ref 0.3–6.2)
ERYTHROCYTE [DISTWIDTH] IN BLOOD BY AUTOMATED COUNT: 16.3 % (ref 12.3–15.4)
HBA1C MFR BLD: 6.28 % (ref 4.8–5.6)
HCT VFR BLD AUTO: 36.2 % (ref 34–46.6)
HGB BLD-MCNC: 10.2 G/DL (ref 12–15.9)
IMM GRANULOCYTES # BLD AUTO: 0.05 10*3/MM3 (ref 0–0.05)
IMM GRANULOCYTES NFR BLD AUTO: 0.7 % (ref 0–0.5)
LYMPHOCYTES # BLD AUTO: 2.14 10*3/MM3 (ref 0.7–3.1)
LYMPHOCYTES NFR BLD AUTO: 28.6 % (ref 19.6–45.3)
MCH RBC QN AUTO: 24.7 PG (ref 26.6–33)
MCHC RBC AUTO-ENTMCNC: 28.2 G/DL (ref 31.5–35.7)
MCV RBC AUTO: 87.7 FL (ref 79–97)
MONOCYTES # BLD AUTO: 0.42 10*3/MM3 (ref 0.1–0.9)
MONOCYTES NFR BLD AUTO: 5.6 % (ref 5–12)
NEUTROPHILS # BLD AUTO: 4.65 10*3/MM3 (ref 1.4–7)
NEUTROPHILS NFR BLD AUTO: 62.2 % (ref 42.7–76)
NRBC BLD AUTO-RTO: 0 /100 WBC (ref 0–0)
PLATELET # BLD AUTO: 496 10*3/MM3 (ref 140–450)
PMV BLD AUTO: 10.7 FL (ref 6–12)
RBC # BLD AUTO: 4.13 10*6/MM3 (ref 3.77–5.28)
WBC NRBC COR # BLD: 7.48 10*3/MM3 (ref 3.4–10.8)

## 2019-04-12 PROCEDURE — 85025 COMPLETE CBC W/AUTO DIFF WBC: CPT

## 2019-04-12 PROCEDURE — 80053 COMPREHEN METABOLIC PANEL: CPT

## 2019-04-12 PROCEDURE — 80061 LIPID PANEL: CPT

## 2019-04-12 PROCEDURE — 36415 COLL VENOUS BLD VENIPUNCTURE: CPT

## 2019-04-12 PROCEDURE — 82043 UR ALBUMIN QUANTITATIVE: CPT

## 2019-04-12 PROCEDURE — 83036 HEMOGLOBIN GLYCOSYLATED A1C: CPT

## 2019-04-12 PROCEDURE — 99214 OFFICE O/P EST MOD 30 MIN: CPT | Performed by: PHYSICIAN ASSISTANT

## 2019-04-12 RX ORDER — IBUPROFEN 800 MG/1
800 TABLET ORAL EVERY 8 HOURS PRN
Qty: 90 TABLET | Refills: 5 | OUTPATIENT
Start: 2019-04-12 | End: 2022-04-20

## 2019-04-12 RX ORDER — CETIRIZINE HYDROCHLORIDE 5 MG/1
5 TABLET ORAL DAILY
COMMUNITY
End: 2021-05-24

## 2019-04-12 RX ORDER — FLUTICASONE PROPIONATE 50 MCG
2 SPRAY, SUSPENSION (ML) NASAL DAILY PRN
COMMUNITY
End: 2022-05-26 | Stop reason: SDUPTHER

## 2019-04-12 RX ORDER — BUDESONIDE AND FORMOTEROL FUMARATE DIHYDRATE 160; 4.5 UG/1; UG/1
2 AEROSOL RESPIRATORY (INHALATION)
COMMUNITY
End: 2019-10-18

## 2019-04-12 NOTE — PATIENT INSTRUCTIONS
Monitor blood pressure, call if readings are consistently above 140/90.    Discussed weight loss program she will try a low-carb diet.  Also recommended 150 minutes of exercise a week.    She will follow-up here in 6 months for an annual wellness exam or sooner if needed.

## 2019-04-12 NOTE — PROGRESS NOTES
Patient Care Team:  Shana Hernandez PA-C as PCP - General (Internal Medicine)  Jorge Alberto Arellano MD as Obstetrician (Obstetrics and Gynecology)    Chief Complaint;:   Chief Complaint   Patient presents with   • Hypertension     She does not check BP at home   • Obesity   • Edema        Subjective     HPI  33-year-old black female presents to the office today for follow-up of her hypertension.  Her blood pressure is under much better control on the amlodipine 10 mg.  However she is having lower extremity edema.  Her GYN does not want to change her blood pressure medication since she is trying to get pregnant.  She recently started furosemide and is not taking any potassium.  We will check labs today.    Obesity is worsening.  She is up 2 pounds since her last visit.  She states that she has been trying to follow the keto diet.    Allergic rhinitis worse during the spring she is taking the Zyrtec and Flonase which does help.  The allergies have also flared her asthma however this is controlled with her Symbicort inhaler.  Past Medical History:   Diagnosis Date   • Anemia    • Asthma    • Depression    • Diabetes mellitus (CMS/HCC)    • Hypertension    • Osteoarthritis of back    • Prediabetes        Past Surgical History:   Procedure Laterality Date   • DIAGNOSTIC LAPAROSCOPY  12/13/2018   • EYE SURGERY  2015    eyelid lift   • GALLBLADDER SURGERY  04/2018   • TONSILLECTOMY     • WISDOM TOOTH EXTRACTION         Family History   Problem Relation Age of Onset   • Endometrial cancer Mother 59   • Diabetes Mother    • Hypertension Mother    • Breast cancer Maternal Grandmother 80   • Colon cancer Maternal Grandmother 90   • Arthritis Maternal Grandmother    • Birth defects Maternal Grandmother    • Diabetes Maternal Grandmother    • Breast cancer Maternal Aunt 60   • Diabetes Father    • Hypertension Father    • Diabetes Paternal Grandmother    • Hypertension Paternal Grandmother    • Hypertension Paternal  Grandfather        Social History     Socioeconomic History   • Marital status: Single     Spouse name: Not on file   • Number of children: Not on file   • Years of education: Not on file   • Highest education level: Not on file   Tobacco Use   • Smoking status: Never Smoker   • Smokeless tobacco: Never Used   Substance and Sexual Activity   • Alcohol use: Yes     Alcohol/week: 1.2 oz     Types: 2 Glasses of wine per week     Frequency: 2-4 times a month     Comment: OCCASIONALLY   • Drug use: No   • Sexual activity: Yes     Partners: Male     Birth control/protection: Condom       Allergies   Allergen Reactions   • Eggs Or Egg-Derived Products Nausea And Vomiting   • Mold Extract [Trichophyton] Unknown (See Comments)     unknown       Review of Systems:     Review of Systems   Constitutional: Negative.    HENT: Positive for postnasal drip, rhinorrhea and sneezing.    Respiratory: Negative.    Cardiovascular: Negative.    Gastrointestinal: Negative.    Endocrine: Negative.    Musculoskeletal: Negative.    Skin: Negative.    Allergic/Immunologic: Positive for environmental allergies.   Neurological: Negative.    Hematological: Negative.    Psychiatric/Behavioral: Negative.        Vital Signs  Vitals:    04/12/19 0835   BP: 124/62   BP Location: Left arm   Patient Position: Sitting   Cuff Size: Adult   Pulse: 80   Weight: 114 kg (252 lb)         Current Outpatient Medications:   •  amLODIPine (NORVASC) 10 MG tablet, Take 1 tablet by mouth Daily., Disp: 90 tablet, Rfl: 3  •  budesonide-formoterol (SYMBICORT) 160-4.5 MCG/ACT inhaler, Inhale 2 puffs 2 (Two) Times a Day., Disp: , Rfl:   •  cetirizine (zyrTEC) 5 MG tablet, Take 5 mg by mouth Daily., Disp: , Rfl:   •  fluticasone (FLONASE) 50 MCG/ACT nasal spray, 2 sprays into the nostril(s) as directed by provider Daily., Disp: , Rfl:   •  furosemide (LASIX) 20 MG tablet, Take 1 tablet by mouth Daily., Disp: 90 tablet, Rfl: 3  •  ibuprofen (ADVIL,MOTRIN) 800 MG tablet,  Take 1 tablet by mouth Every 8 (Eight) Hours As Needed for Mild Pain ., Disp: 90 tablet, Rfl: 5  •  naproxen sodium (ALEVE) 220 MG tablet, Take 220 mg by mouth 2 (Two) Times a Day As Needed., Disp: , Rfl:     Physical Exam:    Physical Exam   Constitutional: She is oriented to person, place, and time. She appears well-developed and well-nourished.   HENT:   Head: Normocephalic and atraumatic.   Neck: Normal range of motion. Neck supple. No thyromegaly present.   Cardiovascular: Normal rate, regular rhythm and normal heart sounds.   Pulmonary/Chest: Effort normal and breath sounds normal.   Abdominal: Soft. Bowel sounds are normal.   Musculoskeletal: Normal range of motion.   Lymphadenopathy:     She has no cervical adenopathy.   Neurological: She is alert and oriented to person, place, and time.   Psychiatric: She has a normal mood and affect. Her behavior is normal. Judgment and thought content normal.   Nursing note and vitals reviewed.      Procedures     Results Review:    I reviewed the patient's new clinical results.    Assessment/Plan   Problem List Items Addressed This Visit        Cardiovascular and Mediastinum    Essential hypertension - Primary    Relevant Medications    amLODIPine (NORVASC) 10 MG tablet    furosemide (LASIX) 20 MG tablet    Other Relevant Orders    Comprehensive metabolic panel    Lipid panel    MicroAlbumin, Urine, Random - Urine, Clean Catch       Respiratory    Mild intermittent asthma without complication    Relevant Medications    budesonide-formoterol (SYMBICORT) 160-4.5 MCG/ACT inhaler    Other Relevant Orders    CBC w AUTO Differential       Digestive    Morbid obesity with body mass index (BMI) of 40.0 to 49.9 (CMS/HCC)       Other    Prediabetes    Relevant Orders    Hemoglobin A1c        Patient Instructions   Monitor blood pressure, call if readings are consistently above 140/90.    Discussed weight loss program she will try a low-carb diet.  Also recommended 150 minutes of  exercise a week.    She will follow-up here in 6 months for an annual wellness exam or sooner if needed.      Plan of care reviewed with patient at the conclusion of today's visit. Education was provided regarding diagnosis, management, and any prescribed or recommended OTC medications.Patient verbalizes understanding of and agreement with management plan.     Shana Hernandez PA-C

## 2019-04-13 LAB
ALBUMIN SERPL-MCNC: 4.4 G/DL (ref 3.5–5.2)
ALBUMIN/GLOB SERPL: 1.3 G/DL
ALP SERPL-CCNC: 74 U/L (ref 39–117)
ALT SERPL W P-5'-P-CCNC: 18 U/L (ref 1–33)
ANION GAP SERPL CALCULATED.3IONS-SCNC: 11.5 MMOL/L
AST SERPL-CCNC: 15 U/L (ref 1–32)
BILIRUB SERPL-MCNC: 0.3 MG/DL (ref 0.2–1.2)
BUN BLD-MCNC: 7 MG/DL (ref 6–20)
BUN/CREAT SERPL: 9.9 (ref 7–25)
CALCIUM SPEC-SCNC: 9.1 MG/DL (ref 8.6–10.5)
CHLORIDE SERPL-SCNC: 98 MMOL/L (ref 98–107)
CHOLEST SERPL-MCNC: 150 MG/DL (ref 0–200)
CO2 SERPL-SCNC: 26.5 MMOL/L (ref 22–29)
CREAT BLD-MCNC: 0.71 MG/DL (ref 0.57–1)
GFR SERPL CREATININE-BSD FRML MDRD: 114 ML/MIN/1.73
GLOBULIN UR ELPH-MCNC: 3.4 GM/DL
GLUCOSE BLD-MCNC: 146 MG/DL (ref 65–99)
HDLC SERPL-MCNC: 45 MG/DL (ref 40–60)
LDLC SERPL CALC-MCNC: 93 MG/DL (ref 0–100)
LDLC/HDLC SERPL: 2.06 {RATIO}
POTASSIUM BLD-SCNC: 3.9 MMOL/L (ref 3.5–5.2)
PROT SERPL-MCNC: 7.8 G/DL (ref 6–8.5)
SODIUM BLD-SCNC: 136 MMOL/L (ref 136–145)
TRIGL SERPL-MCNC: 61 MG/DL (ref 0–150)
VLDLC SERPL-MCNC: 12.2 MG/DL (ref 5–40)

## 2019-06-25 ENCOUNTER — OFFICE VISIT (OUTPATIENT)
Dept: OBSTETRICS AND GYNECOLOGY | Facility: CLINIC | Age: 34
End: 2019-06-25

## 2019-06-25 VITALS
DIASTOLIC BLOOD PRESSURE: 100 MMHG | SYSTOLIC BLOOD PRESSURE: 152 MMHG | HEIGHT: 63 IN | WEIGHT: 254.4 LBS | BODY MASS INDEX: 45.07 KG/M2 | RESPIRATION RATE: 14 BRPM

## 2019-06-25 DIAGNOSIS — E66.01 MORBID OBESITY WITH BODY MASS INDEX (BMI) OF 40.0 TO 49.9 (HCC): Primary | ICD-10-CM

## 2019-06-25 PROCEDURE — 99213 OFFICE O/P EST LOW 20 MIN: CPT | Performed by: OBSTETRICS & GYNECOLOGY

## 2019-08-05 ENCOUNTER — HOSPITAL ENCOUNTER (EMERGENCY)
Facility: HOSPITAL | Age: 34
Discharge: HOME | End: 2019-08-05
Attending: EMERGENCY MEDICINE | Admitting: EMERGENCY MEDICINE

## 2019-08-05 VITALS
SYSTOLIC BLOOD PRESSURE: 165 MMHG | WEIGHT: 250 LBS | RESPIRATION RATE: 16 BRPM | HEART RATE: 100 BPM | DIASTOLIC BLOOD PRESSURE: 108 MMHG | HEIGHT: 64 IN | BODY MASS INDEX: 42.68 KG/M2 | TEMPERATURE: 98.2 F | OXYGEN SATURATION: 100 %

## 2019-08-05 DIAGNOSIS — E66.9 DIABETES MELLITUS TYPE 2 IN OBESE (HCC): ICD-10-CM

## 2019-08-05 DIAGNOSIS — I10 UNCONTROLLED HYPERTENSION: Primary | ICD-10-CM

## 2019-08-05 DIAGNOSIS — E11.69 DIABETES MELLITUS TYPE 2 IN OBESE (HCC): ICD-10-CM

## 2019-08-05 LAB
ALBUMIN SERPL-MCNC: 4.2 G/DL (ref 3.5–5.2)
ALBUMIN/GLOB SERPL: 1.1 G/DL
ALP SERPL-CCNC: 87 U/L (ref 39–117)
ALT SERPL W P-5'-P-CCNC: 16 U/L (ref 1–33)
ANION GAP SERPL CALCULATED.3IONS-SCNC: 13 MMOL/L (ref 5–15)
AST SERPL-CCNC: 20 U/L (ref 1–32)
BASOPHILS # BLD AUTO: 0.06 10*3/MM3 (ref 0–0.2)
BASOPHILS NFR BLD AUTO: 0.9 % (ref 0–1.5)
BILIRUB SERPL-MCNC: 0.2 MG/DL (ref 0.2–1.2)
BUN BLD-MCNC: 12 MG/DL (ref 6–20)
BUN/CREAT SERPL: 17.6 (ref 7–25)
CALCIUM SPEC-SCNC: 9.4 MG/DL (ref 8.6–10.5)
CHLORIDE SERPL-SCNC: 100 MMOL/L (ref 98–107)
CO2 SERPL-SCNC: 26 MMOL/L (ref 22–29)
CREAT BLD-MCNC: 0.68 MG/DL (ref 0.57–1)
DEPRECATED RDW RBC AUTO: 45.4 FL (ref 37–54)
EOSINOPHIL # BLD AUTO: 0.15 10*3/MM3 (ref 0–0.4)
EOSINOPHIL NFR BLD AUTO: 2.2 % (ref 0.3–6.2)
ERYTHROCYTE [DISTWIDTH] IN BLOOD BY AUTOMATED COUNT: 15.2 % (ref 12.3–15.4)
GFR SERPL CREATININE-BSD FRML MDRD: 119 ML/MIN/1.73
GLOBULIN UR ELPH-MCNC: 3.7 GM/DL
GLUCOSE BLD-MCNC: 339 MG/DL (ref 65–99)
HBA1C MFR BLD: 7.6 % (ref 4.8–5.6)
HCT VFR BLD AUTO: 35.5 % (ref 34–46.6)
HGB BLD-MCNC: 10.5 G/DL (ref 12–15.9)
HOLD SPECIMEN: NORMAL
HOLD SPECIMEN: NORMAL
IMM GRANULOCYTES # BLD AUTO: 0.02 10*3/MM3 (ref 0–0.05)
IMM GRANULOCYTES NFR BLD AUTO: 0.3 % (ref 0–0.5)
LYMPHOCYTES # BLD AUTO: 2.73 10*3/MM3 (ref 0.7–3.1)
LYMPHOCYTES NFR BLD AUTO: 39.8 % (ref 19.6–45.3)
MCH RBC QN AUTO: 24 PG (ref 26.6–33)
MCHC RBC AUTO-ENTMCNC: 29.6 G/DL (ref 31.5–35.7)
MCV RBC AUTO: 81.2 FL (ref 79–97)
MONOCYTES # BLD AUTO: 0.55 10*3/MM3 (ref 0.1–0.9)
MONOCYTES NFR BLD AUTO: 8 % (ref 5–12)
NEUTROPHILS # BLD AUTO: 3.35 10*3/MM3 (ref 1.7–7)
NEUTROPHILS NFR BLD AUTO: 48.8 % (ref 42.7–76)
NRBC BLD AUTO-RTO: 0 /100 WBC (ref 0–0.2)
PLATELET # BLD AUTO: 411 10*3/MM3 (ref 140–450)
PMV BLD AUTO: 10.3 FL (ref 6–12)
POTASSIUM BLD-SCNC: 3.8 MMOL/L (ref 3.5–5.2)
PROT SERPL-MCNC: 7.9 G/DL (ref 6–8.5)
RBC # BLD AUTO: 4.37 10*6/MM3 (ref 3.77–5.28)
SODIUM BLD-SCNC: 139 MMOL/L (ref 136–145)
WBC NRBC COR # BLD: 6.86 10*3/MM3 (ref 3.4–10.8)
WHOLE BLOOD HOLD SPECIMEN: NORMAL
WHOLE BLOOD HOLD SPECIMEN: NORMAL

## 2019-08-05 PROCEDURE — 85025 COMPLETE CBC W/AUTO DIFF WBC: CPT

## 2019-08-05 PROCEDURE — 99284 EMERGENCY DEPT VISIT MOD MDM: CPT

## 2019-08-05 PROCEDURE — 80053 COMPREHEN METABOLIC PANEL: CPT

## 2019-08-05 PROCEDURE — 83036 HEMOGLOBIN GLYCOSYLATED A1C: CPT | Performed by: EMERGENCY MEDICINE

## 2019-08-05 RX ORDER — LISINOPRIL 10 MG/1
10 TABLET ORAL ONCE
Status: COMPLETED | OUTPATIENT
Start: 2019-08-05 | End: 2019-08-05

## 2019-08-05 RX ORDER — LISINOPRIL 10 MG/1
10 TABLET ORAL DAILY
Qty: 30 TABLET | Refills: 0 | Status: SHIPPED | OUTPATIENT
Start: 2019-08-05 | End: 2019-08-15 | Stop reason: SDUPTHER

## 2019-08-05 RX ADMIN — METFORMIN HYDROCHLORIDE 500 MG: 500 TABLET, FILM COATED ORAL at 20:59

## 2019-08-05 RX ADMIN — LISINOPRIL 10 MG: 10 TABLET ORAL at 20:35

## 2019-08-06 NOTE — ED PROVIDER NOTES
Subjective   Ms. Putnam is a 34 y/o female who has had a dry cough for the last four days, getting worse over time.  She went to a [facility] for this and was found to have significant hypertension, so was sent here.  She has a mild HA, worse with cough, but no confusion or disorientation, no chest pain, no stroke symptoms.  Known hx of hypertension, thought to be well-controlled on amlodipine.  She uses her furosemide PRN for swelling problems.  No other complaints at this time.  PMH, PSH reviewed            Review of Systems   Constitutional: Negative.  Negative for fever.   HENT: Positive for sinus pressure. Negative for postnasal drip, rhinorrhea, sneezing and sore throat.    Respiratory: Positive for cough (dry) and shortness of breath (occasional dyspnea).    Cardiovascular: Positive for leg swelling (periodically).   Gastrointestinal: Negative.  Negative for nausea and vomiting.   Endocrine:        Admits to diagnosis of prediabetes, had been on metformin.   Neurological: Negative.  Negative for weakness and headaches.   Psychiatric/Behavioral: Negative.        Past Medical History:   Diagnosis Date   • Anemia    • Asthma    • Depression    • Diabetes mellitus (CMS/HCC)    • Hypertension    • Osteoarthritis of back    • Prediabetes        Allergies   Allergen Reactions   • Eggs Or Egg-Derived Products Nausea And Vomiting   • Mold Extract [Trichophyton] Unknown (See Comments)     unknown       Past Surgical History:   Procedure Laterality Date   • DIAGNOSTIC LAPAROSCOPY  12/13/2018   • EYE SURGERY  2015    eyelid lift   • GALLBLADDER SURGERY  04/2018   • TONSILLECTOMY     • WISDOM TOOTH EXTRACTION         Family History   Problem Relation Age of Onset   • Endometrial cancer Mother 59   • Diabetes Mother    • Hypertension Mother    • Breast cancer Maternal Grandmother 80   • Colon cancer Maternal Grandmother 90   • Arthritis Maternal Grandmother    • Birth defects Maternal Grandmother    • Diabetes Maternal  Grandmother    • Breast cancer Maternal Aunt 60   • Diabetes Father    • Hypertension Father    • Diabetes Paternal Grandmother    • Hypertension Paternal Grandmother    • Hypertension Paternal Grandfather        Social History     Socioeconomic History   • Marital status: Single     Spouse name: Not on file   • Number of children: Not on file   • Years of education: Not on file   • Highest education level: Not on file   Tobacco Use   • Smoking status: Never Smoker   • Smokeless tobacco: Never Used   Substance and Sexual Activity   • Alcohol use: Yes     Alcohol/week: 1.2 oz     Types: 2 Glasses of wine per week     Frequency: 2-4 times a month     Comment: OCCASIONALLY   • Drug use: No   • Sexual activity: Yes     Partners: Male     Birth control/protection: Condom           Objective   Physical Exam   Constitutional: She is oriented to person, place, and time. No distress.   Intelligent, morbidly obese black female   HENT:   Head: Atraumatic.   Mouth/Throat: Oropharynx is clear and moist.   Airway patent   Eyes: Conjunctivae are normal. No scleral icterus.   Neck: Phonation normal. Neck supple. No thyromegaly present.   Cardiovascular: Normal rate, regular rhythm and normal heart sounds.   Pulmonary/Chest: Effort normal and breath sounds normal. No respiratory distress.   Abdominal: Soft. There is no tenderness.   Musculoskeletal: She exhibits no edema.   Lymphadenopathy:     She has no cervical adenopathy.   Neurological: She is alert and oriented to person, place, and time.   Skin: Skin is warm and dry.   Psychiatric: She has a normal mood and affect. Her behavior is normal.   Nursing note and vitals reviewed.      Procedures           ED Course  ED Course as of Aug 05 2116   Mon Aug 05, 2019   2114 Her initial BPs at triage and when put in the room were done with a too small cuff.  I place a large adult cuff and checked pressures in both arms with findings of 180s over 120s.  With no symptoms or signs of organ  dysfunction, outpatient treatment appropriate.  Also starting diabetes treatment.  [LI]      ED Course User Index  [LI] Hardik Ludwig MD      Recent Results (from the past 24 hour(s))   Comprehensive Metabolic Panel    Collection Time: 08/05/19  5:12 PM   Result Value Ref Range    Glucose 339 (H) 65 - 99 mg/dL    BUN 12 6 - 20 mg/dL    Creatinine 0.68 0.57 - 1.00 mg/dL    Sodium 139 136 - 145 mmol/L    Potassium 3.8 3.5 - 5.2 mmol/L    Chloride 100 98 - 107 mmol/L    CO2 26.0 22.0 - 29.0 mmol/L    Calcium 9.4 8.6 - 10.5 mg/dL    Total Protein 7.9 6.0 - 8.5 g/dL    Albumin 4.20 3.50 - 5.20 g/dL    ALT (SGPT) 16 1 - 33 U/L    AST (SGOT) 20 1 - 32 U/L    Alkaline Phosphatase 87 39 - 117 U/L    Total Bilirubin 0.2 0.2 - 1.2 mg/dL    eGFR  African Amer 119 >60 mL/min/1.73    Globulin 3.7 gm/dL    A/G Ratio 1.1 g/dL    BUN/Creatinine Ratio 17.6 7.0 - 25.0    Anion Gap 13.0 5.0 - 15.0 mmol/L   Light Blue Top    Collection Time: 08/05/19  5:12 PM   Result Value Ref Range    Extra Tube hold for add-on    Green Top (Gel)    Collection Time: 08/05/19  5:12 PM   Result Value Ref Range    Extra Tube Hold for add-ons.    Lavender Top    Collection Time: 08/05/19  5:12 PM   Result Value Ref Range    Extra Tube hold for add-on    Gold Top - SST    Collection Time: 08/05/19  5:12 PM   Result Value Ref Range    Extra Tube Hold for add-ons.    CBC Auto Differential    Collection Time: 08/05/19  5:12 PM   Result Value Ref Range    WBC 6.86 3.40 - 10.80 10*3/mm3    RBC 4.37 3.77 - 5.28 10*6/mm3    Hemoglobin 10.5 (L) 12.0 - 15.9 g/dL    Hematocrit 35.5 34.0 - 46.6 %    MCV 81.2 79.0 - 97.0 fL    MCH 24.0 (L) 26.6 - 33.0 pg    MCHC 29.6 (L) 31.5 - 35.7 g/dL    RDW 15.2 12.3 - 15.4 %    RDW-SD 45.4 37.0 - 54.0 fl    MPV 10.3 6.0 - 12.0 fL    Platelets 411 140 - 450 10*3/mm3    Neutrophil % 48.8 42.7 - 76.0 %    Lymphocyte % 39.8 19.6 - 45.3 %    Monocyte % 8.0 5.0 - 12.0 %    Eosinophil % 2.2 0.3 - 6.2 %    Basophil % 0.9 0.0 - 1.5 %     Immature Grans % 0.3 0.0 - 0.5 %    Neutrophils, Absolute 3.35 1.70 - 7.00 10*3/mm3    Lymphocytes, Absolute 2.73 0.70 - 3.10 10*3/mm3    Monocytes, Absolute 0.55 0.10 - 0.90 10*3/mm3    Eosinophils, Absolute 0.15 0.00 - 0.40 10*3/mm3    Basophils, Absolute 0.06 0.00 - 0.20 10*3/mm3    Immature Grans, Absolute 0.02 0.00 - 0.05 10*3/mm3    nRBC 0.0 0.0 - 0.2 /100 WBC   Hemoglobin A1c    Collection Time: 08/05/19  5:12 PM   Result Value Ref Range    Hemoglobin A1C 7.60 (H) 4.80 - 5.60 %     Note: In addition to lab results from this visit, the labs listed above may include labs taken at another facility or during a different encounter within the last 24 hours. Please correlate lab times with ED admission and discharge times for further clarification of the services performed during this visit.    No orders to display     Vitals:    08/05/19 2008 08/05/19 2009 08/05/19 2100 08/05/19 2113   BP:  (!) 189/125 (!) 165/108    BP Location:       Patient Position:       Pulse: 98 99 100    Resp:    16   Temp:    98.2 °F (36.8 °C)   TempSrc:    Oral   SpO2: 98% 99% 100%    Weight:       Height:         Medications   lisinopril (PRINIVIL,ZESTRIL) tablet 10 mg (10 mg Oral Given 8/5/19 2035)   metFORMIN (GLUCOPHAGE) tablet 500 mg (500 mg Oral Given 8/5/19 2059)     ECG/EMG Results (last 24 hours)     ** No results found for the last 24 hours. **        No orders to display                   MDM      Final diagnoses:   Uncontrolled hypertension   Diabetes mellitus type 2 in obese (CMS/[facility])            Hardik Ludwig MD  08/05/19 2117

## 2019-08-06 NOTE — DISCHARGE INSTRUCTIONS
It will be important for you not only to get your blood pressure under control, but to work on your diabetes and weight loss.

## 2019-08-15 ENCOUNTER — TELEPHONE (OUTPATIENT)
Dept: INTERNAL MEDICINE | Facility: CLINIC | Age: 34
End: 2019-08-15

## 2019-08-15 ENCOUNTER — OFFICE VISIT (OUTPATIENT)
Dept: INTERNAL MEDICINE | Facility: CLINIC | Age: 34
End: 2019-08-15

## 2019-08-15 VITALS
TEMPERATURE: 98.5 F | BODY MASS INDEX: 44.05 KG/M2 | HEIGHT: 64 IN | SYSTOLIC BLOOD PRESSURE: 150 MMHG | HEART RATE: 100 BPM | WEIGHT: 258 LBS | DIASTOLIC BLOOD PRESSURE: 90 MMHG

## 2019-08-15 DIAGNOSIS — E11.9 TYPE 2 DIABETES MELLITUS WITHOUT COMPLICATION, WITHOUT LONG-TERM CURRENT USE OF INSULIN (HCC): ICD-10-CM

## 2019-08-15 DIAGNOSIS — N30.01 ACUTE CYSTITIS WITH HEMATURIA: Primary | ICD-10-CM

## 2019-08-15 DIAGNOSIS — I10 ESSENTIAL HYPERTENSION: ICD-10-CM

## 2019-08-15 LAB
BILIRUB BLD-MCNC: NEGATIVE MG/DL
CLARITY, POC: ABNORMAL
COLOR UR: ABNORMAL
GLUCOSE UR STRIP-MCNC: NEGATIVE MG/DL
KETONES UR QL: NEGATIVE
LEUKOCYTE EST, POC: ABNORMAL
NITRITE UR-MCNC: POSITIVE MG/ML
PH UR: 6.5 [PH] (ref 5–8)
PROT UR STRIP-MCNC: ABNORMAL MG/DL
RBC # UR STRIP: ABNORMAL /UL
SP GR UR: 1.02 (ref 1–1.03)
UROBILINOGEN UR QL: NORMAL

## 2019-08-15 PROCEDURE — 99214 OFFICE O/P EST MOD 30 MIN: CPT | Performed by: PHYSICIAN ASSISTANT

## 2019-08-15 PROCEDURE — 81003 URINALYSIS AUTO W/O SCOPE: CPT | Performed by: PHYSICIAN ASSISTANT

## 2019-08-15 RX ORDER — LISINOPRIL 20 MG/1
20 TABLET ORAL DAILY
Qty: 90 TABLET | Refills: 3 | Status: SHIPPED | OUTPATIENT
Start: 2019-08-15 | End: 2020-08-20 | Stop reason: SDUPTHER

## 2019-08-15 RX ORDER — METFORMIN HYDROCHLORIDE EXTENDED-RELEASE TABLETS 1000 MG/1
1000 TABLET, FILM COATED, EXTENDED RELEASE ORAL
Qty: 30 TABLET | Refills: 5 | Status: SHIPPED | OUTPATIENT
Start: 2019-08-15 | End: 2019-08-16

## 2019-08-15 RX ORDER — SULFAMETHOXAZOLE AND TRIMETHOPRIM 800; 160 MG/1; MG/1
1 TABLET ORAL 2 TIMES DAILY
Qty: 10 TABLET | Refills: 0 | Status: SHIPPED | OUTPATIENT
Start: 2019-08-15 | End: 2019-10-18

## 2019-08-15 NOTE — PROGRESS NOTES
Patient Care Team:  Shana Hernandez PA-C as PCP - General (Internal Medicine)  Jorge Alberto Arellano MD as Obstetrician (Obstetrics and Gynecology)    Chief Complaint;:   Chief Complaint   Patient presents with   • Hypertension     ER follow up 8/5/19   • Blood Sugar Problem     ER showed blood sugar greater than 300   • Urinary Tract Infection     blood in urine, painful urination since yesterday        Subjective     HPI  32 yo black female presents for follow up hypertension.   She was in ER, BP was extremely high.   ER started her on lisinopril 10 mg once a day.   She has not purchased a BP cuff yet, money is tight.   Her sugar was also elevated at over 300.   She was given metformin 500 mg to take bid, but she is only taking qd due to diarrhea.  She is trying to exercise and watch her diet.    She also has had dysuria and blood in her urine for the last few days.  Past Medical History:   Diagnosis Date   • Anemia    • Asthma    • Depression    • Diabetes mellitus (CMS/HCC)    • Hypertension    • Osteoarthritis of back    • Prediabetes        Past Surgical History:   Procedure Laterality Date   • DIAGNOSTIC LAPAROSCOPY  12/13/2018   • EYE SURGERY  2015    eyelid lift   • GALLBLADDER SURGERY  04/2018   • TONSILLECTOMY     • WISDOM TOOTH EXTRACTION         Family History   Problem Relation Age of Onset   • Endometrial cancer Mother 59   • Diabetes Mother    • Hypertension Mother    • Breast cancer Maternal Grandmother 80   • Colon cancer Maternal Grandmother 90   • Arthritis Maternal Grandmother    • Birth defects Maternal Grandmother    • Diabetes Maternal Grandmother    • Breast cancer Maternal Aunt 60   • Diabetes Father    • Hypertension Father    • Diabetes Paternal Grandmother    • Hypertension Paternal Grandmother    • Hypertension Paternal Grandfather        Social History     Socioeconomic History   • Marital status: Single     Spouse name: Not on file   • Number of children: Not on file   • Years of  "education: Not on file   • Highest education level: Not on file   Tobacco Use   • Smoking status: Never Smoker   • Smokeless tobacco: Never Used   Substance and Sexual Activity   • Alcohol use: Yes     Alcohol/week: 1.2 oz     Types: 2 Glasses of wine per week     Frequency: 2-4 times a month     Comment: OCCASIONALLY   • Drug use: No   • Sexual activity: Yes     Partners: Male     Birth control/protection: Condom       Allergies   Allergen Reactions   • Eggs Or Egg-Derived Products Nausea And Vomiting   • Mold Extract [Trichophyton] Unknown (See Comments)     unknown       Review of Systems:     Review of Systems   Constitutional: Negative.    Respiratory: Negative for cough and shortness of breath.    Cardiovascular: Negative for chest pain, palpitations and leg swelling.   Genitourinary: Positive for dysuria and hematuria.       Vital Signs  Vitals:    08/15/19 1441   BP: 150/90   BP Location: Right arm   Patient Position: Sitting   Cuff Size: Large Adult   Pulse: 100   Temp: 98.5 °F (36.9 °C)   TempSrc: Temporal   Weight: 117 kg (258 lb)   Height: 162.6 cm (64.02\")   PainSc: 0-No pain         Current Outpatient Medications:   •  amLODIPine (NORVASC) 10 MG tablet, Take 1 tablet by mouth Daily., Disp: 90 tablet, Rfl: 3  •  budesonide-formoterol (SYMBICORT) 160-4.5 MCG/ACT inhaler, Inhale 2 puffs 2 (Two) Times a Day., Disp: , Rfl:   •  cetirizine (zyrTEC) 5 MG tablet, Take 5 mg by mouth Daily., Disp: , Rfl:   •  fluticasone (FLONASE) 50 MCG/ACT nasal spray, 2 sprays into the nostril(s) as directed by provider Daily., Disp: , Rfl:   •  furosemide (LASIX) 20 MG tablet, Take 1 tablet by mouth Daily. (Patient taking differently: Take 20 mg by mouth Daily. As needed), Disp: 90 tablet, Rfl: 3  •  ibuprofen (ADVIL,MOTRIN) 800 MG tablet, Take 1 tablet by mouth Every 8 (Eight) Hours As Needed for Mild Pain ., Disp: 90 tablet, Rfl: 5  •  lisinopril (PRINIVIL,ZESTRIL) 20 MG tablet, Take 1 tablet by mouth Daily., Disp: 90 " tablet, Rfl: 3  •  metFORMIN (FORTAMET) 1000 MG (OSM) 24 hr tablet, Take 1 tablet by mouth Daily With Breakfast., Disp: 30 tablet, Rfl: 5  •  naproxen sodium (ALEVE) 220 MG tablet, Take 220 mg by mouth 2 (Two) Times a Day As Needed., Disp: , Rfl:   •  sulfamethoxazole-trimethoprim (BACTRIM DS) 800-160 MG per tablet, Take 1 tablet by mouth 2 (Two) Times a Day., Disp: 10 tablet, Rfl: 0    Physical Exam:    Physical Exam   Constitutional: She is oriented to person, place, and time.   Obese black female in NAD   Neck: Normal range of motion. Neck supple. No thyromegaly present.   Cardiovascular: Normal rate, regular rhythm and normal heart sounds.   Pulmonary/Chest: Effort normal and breath sounds normal.   Lymphadenopathy:     She has no cervical adenopathy.   Neurological: She is alert and oriented to person, place, and time.   Psychiatric: She has a normal mood and affect. Her behavior is normal. Judgment and thought content normal.   Nursing note and vitals reviewed.      Procedures     Results Review:    I reviewed the patient's new clinical results.    Assessment/Plan   Problem List Items Addressed This Visit        Cardiovascular and Mediastinum    Essential hypertension    Current Assessment & Plan     Hypertension is worsening.   She is taking amilodipine 10 mg once a day.   She started lisinopril 10 mg 7 days ago.   She is not check her BP.         Relevant Medications    amLODIPine (NORVASC) 10 MG tablet    furosemide (LASIX) 20 MG tablet    lisinopril (PRINIVIL,ZESTRIL) 20 MG tablet       Endocrine    Type 2 diabetes mellitus without complication, without long-term current use of insulin (CMS/[facility])    Overview     She is only taking 500 mg metformin a day, taking bid gives her diarrhea.  She started 1 week ago.         Relevant Medications    metFORMIN (FORTAMET) 1000 MG (OSM) 24 hr tablet      Other Visit Diagnoses     Acute cystitis with hematuria    -  Primary    Bactrim twice a day for 5 days    Relevant  Orders    POC Urinalysis Dipstick, Automated (Completed)        Patient Instructions   Increase lisinopril to 20 mg a day.   Continue amilodipine 10 mg a day.  Monitor BP.    Change metformin 500 mg to Metformin ER 1000 mg once a day.   If diarrhea is still a problem, call and will change medication.   She should follow a low sugar and low carb diet.   She states she will be down to 245 by next visit.    Bactrim DS twice a day for 5 days for UTI      Plan of care reviewed with patient at the conclusion of today's visit. Education was provided regarding diagnosis, management, and any prescribed or recommended OTC medications.Patient verbalizes understanding of and agreement with management plan.     Shana Hernandez PA-C

## 2019-08-15 NOTE — TELEPHONE ENCOUNTER
Called pharmacy. Metformin ER OSM was sent in which is very expensive. Is this the brand you wanted to send in?

## 2019-08-15 NOTE — PATIENT INSTRUCTIONS
Increase lisinopril to 20 mg a day.   Continue amilodipine 10 mg a day.  Monitor BP.    Change metformin 500 mg to Metformin ER 1000 mg once a day.   If diarrhea is still a problem, call and will change medication.   She should follow a low sugar and low carb diet.   She states she will be down to 245 by next visit.    Bactrim DS twice a day for 5 days for UTI

## 2019-08-16 RX ORDER — METFORMIN HYDROCHLORIDE 750 MG/1
750 TABLET, EXTENDED RELEASE ORAL
Qty: 30 TABLET | Refills: 5 | Status: SHIPPED | OUTPATIENT
Start: 2019-08-16 | End: 2020-08-20 | Stop reason: SDUPTHER

## 2019-08-16 NOTE — TELEPHONE ENCOUNTER
I need Metformin extended release, is there a cheaper generic.   Pt cannot tolerate the plain metformin.   IF no other option, can cancel order for Metformin ER and I will send in another medication.

## 2019-09-07 NOTE — PATIENT INSTRUCTIONS
OPERATIVE NOTE    Date of Procedure: 9/6/2019   Preoperative Diagnosis:   Dysphagia, Abnormal Barium swallow, Nausea and vomiting, weight loss, Chronic pain, GERD, S/p liver transplant, Recurrent large ventral hernia   Postoperative Diagnosis: Esophageal stricture, Esophagitis  Procedure(s):  ESOPHAGOGASTRODUODENOSCOPY (EGD) WITH JOSHI DILATION  ESOPHAGOGASTRODUODENAL (EGD) BIOPSY  Surgeon(s) and Role:     * Patria Nelson MD - Primary  Surgical Staff:  Endoscopy RN-1: Irlanda BEAVER  No case tracking events are documented in the log. Anesthesia: MAC   Estimated Blood Loss: < 10 mL    Specimens:   ID Type Source Tests Collected by Time Destination   1 : esophageal biopsies Preservative   Teri Pinto MD 9/6/2019 1111 Pathology      Procedure:  After obtaining informed consent, the patient was placed in the left lateral position and received IV anesthesia. See anesthesia records for details. The endoscope was advanced under direct vision without difficulty. The esophagus, stomach (including retroflexed views) and duodenum were evaluated. The patient was taken to the recovery area in stable condition. COMMENTS:  40,72,91 Fr Joshi dilators were passed sequentially in the usual fashion without difficulty after the initial endoscopy. No significant resistance was noted and there was no evident blood on the dilators. Repeat endoscopy after dilation revealed no definite mucosal tear or trauma. A 40 Fr Lovena Goodpasture was then passed in the usual fashion with mild resistance and small amount of blood noted with this larger dilator. Repeat endoscopy after dilation revealed mild superficial tear in proximal esophagus in two places both < 10 mm in size and at EG junction indicating successful dilation.  There was mild bleeding with spontaneous hemostasis confirmed before withdrawal.     Findings:  OROPHARYNX:  Cords and Pyriform recesses appeared normal.  ESOPHAGUS:  The proximal and mid esophagus showed Pt has Medicaid, we are not yet providers.   She needs venous doppler LLE and probable CT of chest to assess for PE.   She was sent to ER.       circumferential narrowing with intermittent rings and fibrotic appearing pale discoloration of the mucosa. The Z line was intact at 39 cms with mild inflammation and erosion at the [facility] junction consistent with reflux esophagitis without deep ulcerations, stricture or Humphreys's. STOMACH:  The fundus on antegrade and retroflexed views was normal. The antrum showed mild focal erythema without erosions or ulcers consistent with mild gastritis. The body and pylorus appeared normal.  DUODENUM:  The bulb also showed focal erythema without erosions consistent with mild duodenitis. The second or descending portion appeared normal.    Recommendations:   Routine post endoscopy + dilation instructions. Start with clear liquid diet and advance as tolerated to pureed. May resume Plavix tomorrow. Resume other medications. Add Pepcid 20 mg p.o. b.i.d. Office will call to arrange follow up EGD with dilation or follow up Office Visit based on biopsy results and response to Dilation. Patient is to call after 2 weeks to check on biopsy results and arrange follow up if not contacted before then. Findings and recommendations were reviewed with patient and  in recovery area after the procedure. Complications: None.     Implants: * No implants in log *    Liliya Dutta MD

## 2019-10-18 ENCOUNTER — LAB (OUTPATIENT)
Dept: LAB | Facility: HOSPITAL | Age: 34
End: 2019-10-18

## 2019-10-18 ENCOUNTER — OFFICE VISIT (OUTPATIENT)
Dept: INTERNAL MEDICINE | Facility: CLINIC | Age: 34
End: 2019-10-18

## 2019-10-18 VITALS
SYSTOLIC BLOOD PRESSURE: 118 MMHG | DIASTOLIC BLOOD PRESSURE: 80 MMHG | HEART RATE: 80 BPM | BODY MASS INDEX: 43.36 KG/M2 | WEIGHT: 254 LBS | HEIGHT: 64 IN

## 2019-10-18 DIAGNOSIS — Z00.00 ENCOUNTER FOR PREVENTIVE HEALTH EXAMINATION: ICD-10-CM

## 2019-10-18 DIAGNOSIS — I10 ESSENTIAL HYPERTENSION: ICD-10-CM

## 2019-10-18 DIAGNOSIS — E11.9 TYPE 2 DIABETES MELLITUS WITHOUT COMPLICATION, WITHOUT LONG-TERM CURRENT USE OF INSULIN (HCC): ICD-10-CM

## 2019-10-18 DIAGNOSIS — F33.1 MODERATE EPISODE OF RECURRENT MAJOR DEPRESSIVE DISORDER (HCC): ICD-10-CM

## 2019-10-18 DIAGNOSIS — Z00.00 ENCOUNTER FOR PREVENTIVE HEALTH EXAMINATION: Primary | ICD-10-CM

## 2019-10-18 DIAGNOSIS — E66.01 MORBID OBESITY WITH BODY MASS INDEX (BMI) OF 40.0 TO 49.9 (HCC): ICD-10-CM

## 2019-10-18 LAB
ALBUMIN SERPL-MCNC: 4.3 G/DL (ref 3.5–5.2)
ALBUMIN/CREATININE RATIO, URINE: ABNORMAL
ALBUMIN/GLOB SERPL: 1.3 G/DL
ALP SERPL-CCNC: 86 U/L (ref 39–117)
ALT SERPL W P-5'-P-CCNC: 17 U/L (ref 1–33)
ANION GAP SERPL CALCULATED.3IONS-SCNC: 13.5 MMOL/L (ref 5–15)
AST SERPL-CCNC: 17 U/L (ref 1–32)
BILIRUB SERPL-MCNC: 0.3 MG/DL (ref 0.2–1.2)
BUN BLD-MCNC: 10 MG/DL (ref 6–20)
BUN/CREAT SERPL: 12.8 (ref 7–25)
CALCIUM SPEC-SCNC: 9.3 MG/DL (ref 8.6–10.5)
CHLORIDE SERPL-SCNC: 97 MMOL/L (ref 98–107)
CHOLEST SERPL-MCNC: 180 MG/DL (ref 0–200)
CO2 SERPL-SCNC: 27.5 MMOL/L (ref 22–29)
CREAT BLD-MCNC: 0.78 MG/DL (ref 0.57–1)
GFR SERPL CREATININE-BSD FRML MDRD: 101 ML/MIN/1.73
GLOBULIN UR ELPH-MCNC: 3.2 GM/DL
GLUCOSE BLD-MCNC: 108 MG/DL (ref 65–99)
HBA1C MFR BLD: 7.5 % (ref 4.8–5.6)
HDLC SERPL-MCNC: 42 MG/DL (ref 40–60)
LDLC SERPL CALC-MCNC: 115 MG/DL (ref 0–100)
LDLC/HDLC SERPL: 2.74 {RATIO}
POC CREATININE URINE: 200
POC MICROALBUMIN URINE: 90
POTASSIUM BLD-SCNC: 4.3 MMOL/L (ref 3.5–5.2)
PROT SERPL-MCNC: 7.5 G/DL (ref 6–8.5)
SODIUM BLD-SCNC: 138 MMOL/L (ref 136–145)
TRIGL SERPL-MCNC: 114 MG/DL (ref 0–150)
TSH SERPL DL<=0.05 MIU/L-ACNC: 1.03 UIU/ML (ref 0.27–4.2)
VLDLC SERPL-MCNC: 22.8 MG/DL (ref 5–40)

## 2019-10-18 PROCEDURE — 83036 HEMOGLOBIN GLYCOSYLATED A1C: CPT

## 2019-10-18 PROCEDURE — 99395 PREV VISIT EST AGE 18-39: CPT | Performed by: PHYSICIAN ASSISTANT

## 2019-10-18 PROCEDURE — 80061 LIPID PANEL: CPT

## 2019-10-18 PROCEDURE — 84443 ASSAY THYROID STIM HORMONE: CPT

## 2019-10-18 PROCEDURE — 80053 COMPREHEN METABOLIC PANEL: CPT

## 2019-10-18 PROCEDURE — 82044 UR ALBUMIN SEMIQUANTITATIVE: CPT | Performed by: PHYSICIAN ASSISTANT

## 2019-10-18 RX ORDER — BUPROPION HYDROCHLORIDE 150 MG/1
150 TABLET ORAL DAILY
Qty: 30 TABLET | Refills: 5 | Status: SHIPPED | OUTPATIENT
Start: 2019-10-18 | End: 2019-11-22 | Stop reason: SDUPTHER

## 2019-10-18 NOTE — PROGRESS NOTES
Patient Care Team:  Shana Hernandez PA-C as PCP - General (Internal Medicine)  Jorge Alberto Arellano MD as Obstetrician (Obstetrics and Gynecology)    Chief Complaint;:   Chief Complaint   Patient presents with   • Annual Exam     She is fasting   • Diabetes   • Hypertension   • Asthma   • Anxiety     She fells like she is deeling with alot and making her feel depressed   • Insomnia     Having trouble staying asleep  Getting about 5 hrs a night         Subjective     HPI  34-year-old black female presents to the office today for preventive exam.  She is fasting for labs.  She does see GYN and is up-to-date.  She has a history of diabetes, hypertension and asthma.  Her asthma has been very well controlled and has not had use an inhaler in several months.  Blood pressure is very well controlled today.  She has been taking her metformin but does not check her blood sugars.    She has been under a lot of stress at work and has high anxiety and depression symptoms.  She states about 15 years ago she had to be on an antidepressant.  She is not sleeping well at night.  She had taken melatonin in the past and plans on trying it this weekend.    Patient has morbid obesity and has not been able to lose weight.  Past Medical History:   Diagnosis Date   • Anemia    • Asthma    • Depression    • Diabetes mellitus (CMS/HCC)    • Hypertension    • Osteoarthritis of back    • Prediabetes        Past Surgical History:   Procedure Laterality Date   • DIAGNOSTIC LAPAROSCOPY  12/13/2018   • EYE SURGERY  2015    eyelid lift   • GALLBLADDER SURGERY  04/2018   • TONSILLECTOMY     • WISDOM TOOTH EXTRACTION         Family History   Problem Relation Age of Onset   • Endometrial cancer Mother 59   • Diabetes Mother    • Hypertension Mother    • Breast cancer Maternal Grandmother 80   • Colon cancer Maternal Grandmother 90   • Arthritis Maternal Grandmother    • Birth defects Maternal Grandmother    • Diabetes Maternal Grandmother    •  "Breast cancer Maternal Aunt 60   • Diabetes Father    • Hypertension Father    • Diabetes Paternal Grandmother    • Hypertension Paternal Grandmother    • Hypertension Paternal Grandfather        Social History     Socioeconomic History   • Marital status: Single     Spouse name: Not on file   • Number of children: Not on file   • Years of education: Not on file   • Highest education level: Not on file   Tobacco Use   • Smoking status: Never Smoker   • Smokeless tobacco: Never Used   Substance and Sexual Activity   • Alcohol use: Yes     Frequency: 2-4 times a month   • Drug use: No   • Sexual activity: Yes     Partners: Male     Birth control/protection: Condom       Allergies   Allergen Reactions   • Eggs Or Egg-Derived Products Nausea And Vomiting   • Mold Extract [Trichophyton] Unknown (See Comments)     unknown       Review of Systems:     Review of Systems   Constitutional: Positive for fatigue.   Respiratory: Negative for cough and shortness of breath.    Cardiovascular: Negative for chest pain, palpitations and leg swelling.   Gastrointestinal: Positive for GERD and indigestion. Negative for abdominal pain, constipation and diarrhea.   Endocrine: Negative.    Musculoskeletal: Negative.    Hematological: Negative.    Psychiatric/Behavioral: Positive for sleep disturbance and depressed mood. The patient is nervous/anxious.        Vital Signs  Vitals:    10/18/19 1303   BP: 118/80   BP Location: Left arm   Patient Position: Sitting   Cuff Size: Large Adult   Pulse: 80   Weight: 115 kg (254 lb)   Height: 162.6 cm (64\")   PainSc: 0-No pain         Current Outpatient Medications:   •  amLODIPine (NORVASC) 10 MG tablet, Take 1 tablet by mouth Daily., Disp: 90 tablet, Rfl: 3  •  cetirizine (zyrTEC) 5 MG tablet, Take 5 mg by mouth Daily., Disp: , Rfl:   •  fluticasone (FLONASE) 50 MCG/ACT nasal spray, 2 sprays into the nostril(s) as directed by provider Daily., Disp: , Rfl:   •  furosemide (LASIX) 20 MG tablet, Take " 1 tablet by mouth Daily. (Patient taking differently: Take 20 mg by mouth Daily. As needed), Disp: 90 tablet, Rfl: 3  •  ibuprofen (ADVIL,MOTRIN) 800 MG tablet, Take 1 tablet by mouth Every 8 (Eight) Hours As Needed for Mild Pain ., Disp: 90 tablet, Rfl: 5  •  lisinopril (PRINIVIL,ZESTRIL) 20 MG tablet, Take 1 tablet by mouth Daily., Disp: 90 tablet, Rfl: 3  •  metFORMIN ER (GLUCOPHAGE-XR) 750 MG 24 hr tablet, Take 1 tablet by mouth Daily With Breakfast., Disp: 30 tablet, Rfl: 5  •  naproxen sodium (ALEVE) 220 MG tablet, Take 220 mg by mouth 2 (Two) Times a Day As Needed., Disp: , Rfl:   •  buPROPion XL (WELLBUTRIN XL) 150 MG 24 hr tablet, Take 1 tablet by mouth Daily., Disp: 30 tablet, Rfl: 5    Health Maintenance:  up to date.     Physical Exam:    Physical Exam   Constitutional: She is oriented to person, place, and time.   Morbidly obese black female no acute distress.   HENT:   Head: Normocephalic and atraumatic.   Mouth/Throat: Oropharynx is clear and moist.   Neck: Normal range of motion. Neck supple. No thyromegaly present.   Cardiovascular: Normal rate, regular rhythm and normal heart sounds.   Pulmonary/Chest: Effort normal and breath sounds normal.   Abdominal: Soft. Bowel sounds are normal. She exhibits no mass. There is no tenderness.   Musculoskeletal: Normal range of motion. She exhibits no edema, tenderness or deformity.   Lymphadenopathy:     She has no cervical adenopathy.   Neurological: She is alert and oriented to person, place, and time.   Skin: Skin is warm and dry.   Psychiatric:   Anxious and easily tearful   Nursing note and vitals reviewed.       Results Review:    I reviewed the patient's new clinical results.    Assessment/Plan   Problem List Items Addressed This Visit        Cardiovascular and Mediastinum    Essential hypertension    Current Assessment & Plan     Hypertension is well controlled         Relevant Medications    amLODIPine (NORVASC) 10 MG tablet    furosemide (LASIX) 20 MG  tablet    lisinopril (PRINIVIL,ZESTRIL) 20 MG tablet    Other Relevant Orders    POC Microalbumin (Completed)       Digestive    Morbid obesity with body mass index (BMI) of 40.0 to 49.9 (CMS/[facility])       Endocrine    Type 2 diabetes mellitus without complication, without long-term current use of insulin (CMS/[facility])    Overview     She is only taking 500 mg metformin a day, taking bid gives her diarrhea.  She started 1 week ago.         Current Assessment & Plan     Check A1C today         Relevant Medications    metFORMIN ER (GLUCOPHAGE-XR) 750 MG 24 hr tablet    Other Relevant Orders    Hemoglobin A1c      Other Visit Diagnoses     Encounter for preventive health examination    -  Primary    Check fasting labs today.  She does see GYN and is up-to-date.    Relevant Orders    CBC & Differential    Comprehensive Metabolic Panel    Lipid Panel    TSH    Moderate episode of recurrent major depressive disorder (CMS/[facility])        Start Wellbutrin 150 mg once a day.  Follow-up here in 4 weeks.    Relevant Medications    buPROPion XL (WELLBUTRIN XL) 150 MG 24 hr tablet        Patient Instructions   Start Wellbutrin 150 mg once in the morning.  Check fasting labs.    Counseled the patient on the following topics, healthy eating habits  Plan of care reviewed with patient at the conclusion of today's visit. Education was provided regarding diagnosis, management, and any prescribed or recommended OTC medications.Patient verbalizes understanding of and agreement with management plan.     hSana Hernandez PA-C

## 2019-11-07 ENCOUNTER — TELEPHONE (OUTPATIENT)
Dept: INTERNAL MEDICINE | Facility: CLINIC | Age: 34
End: 2019-11-07

## 2019-11-22 ENCOUNTER — OFFICE VISIT (OUTPATIENT)
Dept: INTERNAL MEDICINE | Facility: CLINIC | Age: 34
End: 2019-11-22

## 2019-11-22 VITALS
BODY MASS INDEX: 41.48 KG/M2 | HEART RATE: 84 BPM | SYSTOLIC BLOOD PRESSURE: 136 MMHG | DIASTOLIC BLOOD PRESSURE: 78 MMHG | HEIGHT: 64 IN | WEIGHT: 243 LBS

## 2019-11-22 DIAGNOSIS — E66.01 MORBID OBESITY WITH BODY MASS INDEX (BMI) OF 40.0 TO 49.9 (HCC): ICD-10-CM

## 2019-11-22 DIAGNOSIS — F33.42 RECURRENT MAJOR DEPRESSIVE DISORDER, IN FULL REMISSION (HCC): Primary | ICD-10-CM

## 2019-11-22 DIAGNOSIS — I10 ESSENTIAL HYPERTENSION: ICD-10-CM

## 2019-11-22 PROCEDURE — 99214 OFFICE O/P EST MOD 30 MIN: CPT | Performed by: PHYSICIAN ASSISTANT

## 2019-11-22 RX ORDER — BUPROPION HYDROCHLORIDE 300 MG/1
300 TABLET ORAL DAILY
Qty: 30 TABLET | Refills: 5 | Status: SHIPPED | OUTPATIENT
Start: 2019-11-22 | End: 2019-11-22 | Stop reason: SDUPTHER

## 2019-11-22 RX ORDER — BUPROPION HYDROCHLORIDE 300 MG/1
300 TABLET ORAL DAILY
Qty: 30 TABLET | Refills: 5 | Status: SHIPPED | OUTPATIENT
Start: 2019-11-22 | End: 2020-12-03 | Stop reason: SDUPTHER

## 2019-11-22 NOTE — PROGRESS NOTES
Patient Care Team:  Shana Hernandez PA-C as PCP - General (Internal Medicine)  Jorge Alberto Arellano MD as Obstetrician (Obstetrics and Gynecology)    Chief Complaint;:   Chief Complaint   Patient presents with   • Depression     She say wellbutrin is working but it could be better        Subjective     HPI  34-year-old black female presents to the office today for follow-up of her depression.  She is feeling much better on the 150 mg of Wellbutrin and has had an 11 pound weight loss in the last month.  She would like to try the 300 mg Wellbutrin.  She is having no side effects from the medication.    Blood pressure is well controlled.  She has not been checking it at home.  She is trying to get more active and trying to eat healthier.  Past Medical History:   Diagnosis Date   • Anemia    • Asthma    • Depression    • Diabetes mellitus (CMS/HCC)    • Hypertension    • Osteoarthritis of back    • Prediabetes        Past Surgical History:   Procedure Laterality Date   • DIAGNOSTIC LAPAROSCOPY  12/13/2018   • EYE SURGERY  2015    eyelid lift   • GALLBLADDER SURGERY  04/2018   • TONSILLECTOMY     • WISDOM TOOTH EXTRACTION         Family History   Problem Relation Age of Onset   • Endometrial cancer Mother 59   • Diabetes Mother    • Hypertension Mother    • Breast cancer Maternal Grandmother 80   • Colon cancer Maternal Grandmother 90   • Arthritis Maternal Grandmother    • Birth defects Maternal Grandmother    • Diabetes Maternal Grandmother    • Breast cancer Maternal Aunt 60   • Diabetes Father    • Hypertension Father    • Diabetes Paternal Grandmother    • Hypertension Paternal Grandmother    • Hypertension Paternal Grandfather        Social History     Socioeconomic History   • Marital status: Single     Spouse name: Not on file   • Number of children: Not on file   • Years of education: Not on file   • Highest education level: Not on file   Tobacco Use   • Smoking status: Never Smoker   • Smokeless tobacco:  "Never Used   Substance and Sexual Activity   • Alcohol use: Yes     Frequency: 2-4 times a month   • Drug use: No   • Sexual activity: Yes     Partners: Male     Birth control/protection: Condom       Allergies   Allergen Reactions   • Eggs Or Egg-Derived Products Nausea And Vomiting   • Mold Extract [Trichophyton] Unknown (See Comments)     unknown       Review of Systems:     Review of Systems   Constitutional: Negative.    Respiratory: Negative for cough and shortness of breath.    Cardiovascular: Negative for chest pain, palpitations and leg swelling.   Psychiatric/Behavioral: Negative for sleep disturbance, depressed mood and stress.       Vital Signs  Vitals:    11/22/19 0957 11/22/19 1016   BP: 144/92 136/78   BP Location: Left arm    Patient Position: Sitting    Cuff Size: Adult    Pulse: 84    Weight: 110 kg (243 lb)    Height: 162.6 cm (64\")    PainSc: 0-No pain          Current Outpatient Medications:   •  amLODIPine (NORVASC) 10 MG tablet, Take 1 tablet by mouth Daily., Disp: 90 tablet, Rfl: 3  •  buPROPion XL (WELLBUTRIN XL) 300 MG 24 hr tablet, Take 1 tablet by mouth Daily., Disp: 30 tablet, Rfl: 5  •  cetirizine (zyrTEC) 5 MG tablet, Take 5 mg by mouth Daily., Disp: , Rfl:   •  fluticasone (FLONASE) 50 MCG/ACT nasal spray, 2 sprays into the nostril(s) as directed by provider Daily., Disp: , Rfl:   •  furosemide (LASIX) 20 MG tablet, Take 1 tablet by mouth Daily. (Patient taking differently: Take 20 mg by mouth Daily. As needed), Disp: 90 tablet, Rfl: 3  •  ibuprofen (ADVIL,MOTRIN) 800 MG tablet, Take 1 tablet by mouth Every 8 (Eight) Hours As Needed for Mild Pain ., Disp: 90 tablet, Rfl: 5  •  lisinopril (PRINIVIL,ZESTRIL) 20 MG tablet, Take 1 tablet by mouth Daily., Disp: 90 tablet, Rfl: 3  •  metFORMIN ER (GLUCOPHAGE-XR) 750 MG 24 hr tablet, Take 1 tablet by mouth Daily With Breakfast., Disp: 30 tablet, Rfl: 5  •  naproxen sodium (ALEVE) 220 MG tablet, Take 220 mg by mouth 2 (Two) Times a Day As " Needed., Disp: , Rfl:     Physical Exam:    Physical Exam   Constitutional: She is oriented to person, place, and time.   Morbidly obese black female no acute distress   Neck: Normal range of motion. Neck supple.   Cardiovascular: Normal rate, regular rhythm and normal heart sounds.   Pulmonary/Chest: Effort normal and breath sounds normal.   Neurological: She is alert and oriented to person, place, and time.   Psychiatric: She has a normal mood and affect. Her behavior is normal. Judgment and thought content normal.   Nursing note and vitals reviewed.      Procedures     Results Review:    I reviewed the patient's new clinical results.    Assessment/Plan   Problem List Items Addressed This Visit        Cardiovascular and Mediastinum    Essential hypertension    Current Assessment & Plan     Hypertension is stable.   She has not been checking at home.         Relevant Medications    amLODIPine (NORVASC) 10 MG tablet    furosemide (LASIX) 20 MG tablet    lisinopril (PRINIVIL,ZESTRIL) 20 MG tablet       Digestive    Morbid obesity with body mass index (BMI) of 40.0 to 49.9 (CMS/[facility])    Current Assessment & Plan     Down 11 lbs in the last month!!   She thinks Wellbutrin is helping.            Other    Recurrent major depressive disorder, in full remission (CMS/[facility]) - Primary    Current Assessment & Plan     Feeling much better.   She would like to try the 300 mg dose..         Relevant Medications    buPROPion XL (WELLBUTRIN XL) 300 MG 24 hr tablet        Patient Instructions   Increased Wellbutrin  mg once a day.   Continue Weight loss.  Monitor BP.  Follow-up in 3 months or sooner if needed.  We will get fasting labs at next visit.      Plan of care reviewed with patient at the conclusion of today's visit. Education was provided regarding diagnosis, management, and any prescribed or recommended OTC medications.Patient verbalizes understanding of and agreement with management plan.     Shana Hernandez PA-C

## 2019-11-22 NOTE — PATIENT INSTRUCTIONS
Increased Wellbutrin  mg once a day.   Continue Weight loss.  Monitor BP.  Follow-up in 3 months or sooner if needed.  We will get fasting labs at next visit.

## 2019-12-05 ENCOUNTER — APPOINTMENT (OUTPATIENT)
Dept: LAB | Facility: HOSPITAL | Age: 34
End: 2019-12-05

## 2019-12-05 ENCOUNTER — OFFICE VISIT (OUTPATIENT)
Dept: OBSTETRICS AND GYNECOLOGY | Facility: CLINIC | Age: 34
End: 2019-12-05

## 2019-12-05 VITALS
SYSTOLIC BLOOD PRESSURE: 132 MMHG | HEIGHT: 64 IN | WEIGHT: 253.4 LBS | BODY MASS INDEX: 43.26 KG/M2 | DIASTOLIC BLOOD PRESSURE: 88 MMHG

## 2019-12-05 DIAGNOSIS — Z11.3 ROUTINE SCREENING FOR STI (SEXUALLY TRANSMITTED INFECTION): ICD-10-CM

## 2019-12-05 DIAGNOSIS — Z01.419 ENCNTR FOR GYN EXAM (GENERAL) (ROUTINE) W/O ABN FINDINGS: Primary | ICD-10-CM

## 2019-12-05 LAB — HBV SURFACE AG SERPL QL IA: NORMAL

## 2019-12-05 PROCEDURE — 86592 SYPHILIS TEST NON-TREP QUAL: CPT | Performed by: OBSTETRICS & GYNECOLOGY

## 2019-12-05 PROCEDURE — 36415 COLL VENOUS BLD VENIPUNCTURE: CPT | Performed by: OBSTETRICS & GYNECOLOGY

## 2019-12-05 PROCEDURE — 87340 HEPATITIS B SURFACE AG IA: CPT | Performed by: OBSTETRICS & GYNECOLOGY

## 2019-12-05 PROCEDURE — G0432 EIA HIV-1/HIV-2 SCREEN: HCPCS | Performed by: OBSTETRICS & GYNECOLOGY

## 2019-12-05 PROCEDURE — 99395 PREV VISIT EST AGE 18-39: CPT | Performed by: OBSTETRICS & GYNECOLOGY

## 2019-12-05 PROCEDURE — 86803 HEPATITIS C AB TEST: CPT | Performed by: OBSTETRICS & GYNECOLOGY

## 2019-12-05 NOTE — PROGRESS NOTES
Subjective   Chief Complaint   Patient presents with   • Gynecologic Exam     annual; no c/o     Deanna BRANDON Putnam is a 34 y.o. year old  presenting to be seen for her annual exam.     SEXUAL Hx:  She is not currently sexually active.  In the past year there there has been MORE THAN ONE new sexual partner.    Condoms are never used.  She would like to be screened for STD's at today's exam.  Current birth control method: abstinence.  She is happy with her current method of contraception and does not want to discuss alternative methods of contraception.  MENSTRUAL Hx:  Patient's last menstrual period was 2019 (exact date).  In the past 6 months her cycles have been regular, predictable and occur monthly.  Her menstrual flow is typically moderately heavy.   Each month on average there are roughly 3 day(s) of very heavy flow.    Intermenstrual bleeding is absent.    Post-coital bleeding is absent.  Dysmenorrhea: mild and is not affecting her activities of daily living  PMS: mild and is not affecting her activities of daily living  Her cycles are not a source of concern for her that she wishes to discuss today.  HEALTH Hx:  She exercises regularly: no (and has no plans to become more active).  She wears her seat belt: yes.  She has concerns about domestic violence: no.  OTHER THINGS SHE WANTS TO DISCUSS TODAY:  Nothing else    The following portions of the patient's history were reviewed and updated as appropriate:problem list, current medications, allergies, past family history, past medical history, past social history and past surgical history.    Social History    Tobacco Use      Smoking status: Never Smoker      Smokeless tobacco: Never Used    Review of Systems  Constitutional POS: nothing reported    NEG: anorexia or night sweats   Genitourinary POS: nothing reported    NEG: dysuria or hematuria      Gastointestinal POS: nothing reported    NEG: bloating, change in bowel habits, melena or reflux  "symptoms   Integument POS: nothing reported    NEG: moles that are changing in size, shape, color or rashes   Breast POS: nothing reported    NEG: persistent breast lump, skin dimpling or nipple discharge        Objective   /88   Ht 162.6 cm (64\")   Wt 115 kg (253 lb 6.4 oz)   LMP 11/07/2019 (Exact Date)   Breastfeeding? No   BMI 43.50 kg/m²     General:  well developed; well nourished  no acute distress   Skin:  No suspicious lesions seen   Thyroid: normal to inspection and palpation   Breasts:  Examined in supine position  Symmetric without masses or skin dimpling  Nipples normal without inversion, lesions or discharge  There are no palpable axillary nodes   Abdomen: soft, non-tender; no masses  no umbilical or inguinal hernias are present  no hepato-splenomegaly   Pelvis: Exam limited by  body habitus  Clinical staff was present for exam  External genitalia:  normal appearance of the external genitalia including Bartholin's and Halley's glands.  :  urethral meatus normal;  Vaginal:  normal pink mucosa without prolapse or lesions.  Cervix:  normal appearance.  Uterus:  normal size, shape and consistency.  Adnexa:  non palpable bilaterally.        Assessment   1. Normal GYN exam  2. Routine STI screening     Plan   1. Pap was not done today.  I explained to Deanna that the recommendations for Pap smear interval in a low risk patient has lengthened to 3 years time when testing cytology alone and to 5 years time when testing cytology and HPV.  I told Deanna she still needs to be seen in our office yearly for a full physical including breast and pelvic exam.  2. The following tests were ordered today: HIV, RPR, STD swabs for GC, chlamydia and trichimoniasis and Hepatitis B&C.  It was explained to Deanna that all lab test should be back within the one week after they are performed. She will be notified about the results, regardless of the findings. If she has not been contacted by the office within 2 weeks " after the test has been performed, it is her responsibility to contact us to learn about her results.  3. No prescription was given or electronically sent at today's visit  4. The importance of keeping all planned follow-up and taking all medications as prescribed was emphasized.  5. Follow up for annual exam in 1 year    No orders of the defined types were placed in this encounter.         This note was electronically signed.    Leann Alejo, DO  December 5, 2019    Note: Speech recognition transcription software may have been used to create portions of this document.  An attempt at proofreading has been made but errors in transcription could still be present.

## 2019-12-06 LAB
HCV AB SER DONR QL: NORMAL
HIV1+2 AB SER QL: NORMAL
RPR SER QL: NORMAL

## 2020-03-02 ENCOUNTER — OFFICE VISIT (OUTPATIENT)
Dept: INTERNAL MEDICINE | Facility: CLINIC | Age: 35
End: 2020-03-02

## 2020-03-02 ENCOUNTER — LAB (OUTPATIENT)
Dept: LAB | Facility: HOSPITAL | Age: 35
End: 2020-03-02

## 2020-03-02 VITALS
HEIGHT: 64 IN | WEIGHT: 250 LBS | OXYGEN SATURATION: 98 % | SYSTOLIC BLOOD PRESSURE: 126 MMHG | BODY MASS INDEX: 42.68 KG/M2 | HEART RATE: 94 BPM | DIASTOLIC BLOOD PRESSURE: 84 MMHG

## 2020-03-02 DIAGNOSIS — M25.562 ARTHRALGIA OF BOTH KNEES: ICD-10-CM

## 2020-03-02 DIAGNOSIS — R14.0 BLOATING: ICD-10-CM

## 2020-03-02 DIAGNOSIS — I10 ESSENTIAL HYPERTENSION: ICD-10-CM

## 2020-03-02 DIAGNOSIS — Z13.220 SCREENING, LIPID: ICD-10-CM

## 2020-03-02 DIAGNOSIS — M25.561 ARTHRALGIA OF BOTH KNEES: ICD-10-CM

## 2020-03-02 DIAGNOSIS — E11.9 TYPE 2 DIABETES MELLITUS WITHOUT COMPLICATION, WITHOUT LONG-TERM CURRENT USE OF INSULIN (HCC): Primary | ICD-10-CM

## 2020-03-02 DIAGNOSIS — E11.9 TYPE 2 DIABETES MELLITUS WITHOUT COMPLICATION, WITHOUT LONG-TERM CURRENT USE OF INSULIN (HCC): ICD-10-CM

## 2020-03-02 LAB
25(OH)D3 SERPL-MCNC: 8.2 NG/ML (ref 30–100)
ALBUMIN SERPL-MCNC: 4.3 G/DL (ref 3.5–5.2)
ALBUMIN UR-MCNC: 4.5 MG/DL
ALBUMIN/GLOB SERPL: 1.5 G/DL
ALP SERPL-CCNC: 82 U/L (ref 39–117)
ALT SERPL W P-5'-P-CCNC: 17 U/L (ref 1–33)
ANION GAP SERPL CALCULATED.3IONS-SCNC: 11.7 MMOL/L (ref 5–15)
AST SERPL-CCNC: 16 U/L (ref 1–32)
BASOPHILS # BLD AUTO: 0.06 10*3/MM3 (ref 0–0.2)
BASOPHILS NFR BLD AUTO: 0.9 % (ref 0–1.5)
BILIRUB SERPL-MCNC: 0.4 MG/DL (ref 0.2–1.2)
BUN BLD-MCNC: 11 MG/DL (ref 6–20)
BUN/CREAT SERPL: 13.1 (ref 7–25)
CALCIUM SPEC-SCNC: 9.2 MG/DL (ref 8.6–10.5)
CHLORIDE SERPL-SCNC: 99 MMOL/L (ref 98–107)
CHOLEST SERPL-MCNC: 169 MG/DL (ref 0–200)
CO2 SERPL-SCNC: 26.3 MMOL/L (ref 22–29)
CREAT BLD-MCNC: 0.84 MG/DL (ref 0.57–1)
DEPRECATED RDW RBC AUTO: 41.2 FL (ref 37–54)
EOSINOPHIL # BLD AUTO: 0.08 10*3/MM3 (ref 0–0.4)
EOSINOPHIL NFR BLD AUTO: 1.2 % (ref 0.3–6.2)
ERYTHROCYTE [DISTWIDTH] IN BLOOD BY AUTOMATED COUNT: 14.7 % (ref 12.3–15.4)
GFR SERPL CREATININE-BSD FRML MDRD: 93 ML/MIN/1.73
GLOBULIN UR ELPH-MCNC: 2.8 GM/DL
GLUCOSE BLD-MCNC: 147 MG/DL (ref 65–99)
HCT VFR BLD AUTO: 33.1 % (ref 34–46.6)
HDLC SERPL-MCNC: 46 MG/DL (ref 40–60)
HGB BLD-MCNC: 10.5 G/DL (ref 12–15.9)
IMM GRANULOCYTES # BLD AUTO: 0.02 10*3/MM3 (ref 0–0.05)
IMM GRANULOCYTES NFR BLD AUTO: 0.3 % (ref 0–0.5)
LDLC SERPL CALC-MCNC: 104 MG/DL (ref 0–100)
LDLC/HDLC SERPL: 2.26 {RATIO}
LYMPHOCYTES # BLD AUTO: 2.29 10*3/MM3 (ref 0.7–3.1)
LYMPHOCYTES NFR BLD AUTO: 33.5 % (ref 19.6–45.3)
MCH RBC QN AUTO: 24.8 PG (ref 26.6–33)
MCHC RBC AUTO-ENTMCNC: 31.7 G/DL (ref 31.5–35.7)
MCV RBC AUTO: 78.3 FL (ref 79–97)
MONOCYTES # BLD AUTO: 0.48 10*3/MM3 (ref 0.1–0.9)
MONOCYTES NFR BLD AUTO: 7 % (ref 5–12)
NEUTROPHILS # BLD AUTO: 3.91 10*3/MM3 (ref 1.7–7)
NEUTROPHILS NFR BLD AUTO: 57.1 % (ref 42.7–76)
NRBC BLD AUTO-RTO: 0 /100 WBC (ref 0–0.2)
PLATELET # BLD AUTO: 475 10*3/MM3 (ref 140–450)
PMV BLD AUTO: 10.6 FL (ref 6–12)
POTASSIUM BLD-SCNC: 4.3 MMOL/L (ref 3.5–5.2)
PROT SERPL-MCNC: 7.1 G/DL (ref 6–8.5)
RBC # BLD AUTO: 4.23 10*6/MM3 (ref 3.77–5.28)
SODIUM BLD-SCNC: 137 MMOL/L (ref 136–145)
T4 FREE SERPL-MCNC: 1.38 NG/DL (ref 0.93–1.7)
TRIGL SERPL-MCNC: 96 MG/DL (ref 0–150)
TSH SERPL DL<=0.05 MIU/L-ACNC: 1.67 UIU/ML (ref 0.27–4.2)
VIT B12 BLD-MCNC: 418 PG/ML (ref 211–946)
VLDLC SERPL-MCNC: 19.2 MG/DL (ref 5–40)
WBC NRBC COR # BLD: 6.84 10*3/MM3 (ref 3.4–10.8)

## 2020-03-02 PROCEDURE — 83036 HEMOGLOBIN GLYCOSYLATED A1C: CPT

## 2020-03-02 PROCEDURE — 82043 UR ALBUMIN QUANTITATIVE: CPT

## 2020-03-02 PROCEDURE — 85025 COMPLETE CBC W/AUTO DIFF WBC: CPT

## 2020-03-02 PROCEDURE — 80061 LIPID PANEL: CPT

## 2020-03-02 PROCEDURE — 82607 VITAMIN B-12: CPT

## 2020-03-02 PROCEDURE — 82306 VITAMIN D 25 HYDROXY: CPT

## 2020-03-02 PROCEDURE — 80053 COMPREHEN METABOLIC PANEL: CPT

## 2020-03-02 PROCEDURE — 84443 ASSAY THYROID STIM HORMONE: CPT

## 2020-03-02 PROCEDURE — 99214 OFFICE O/P EST MOD 30 MIN: CPT | Performed by: PHYSICIAN ASSISTANT

## 2020-03-02 PROCEDURE — 84439 ASSAY OF FREE THYROXINE: CPT

## 2020-03-02 NOTE — PATIENT INSTRUCTIONS

## 2020-03-02 NOTE — ASSESSMENT & PLAN NOTE
Patient is trying intermittent fasting.  She reports stomach upset and bloat when she does eat.  She reports regular bowel movements.  We will continue to monitor.

## 2020-03-02 NOTE — PROGRESS NOTES
Patient Care Team:  Roberta Rodriguez PA-C as PCP - General (Internal Medicine)  Leann Alejo DO as Consulting Physician (Obstetrics and Gynecology)  Jorge Alberto Arellano MD as Consulting Physician (Obstetrics and Gynecology)  Mentzer, Elizabeth Kathleen, PA (Physician Assistant)    Chief Complaint::   Chief Complaint   Patient presents with   • Hypertension   • Depression   • Diabetes   • Pain     She has lots of pain in knees and hip, it makes it hard for her to sleep        Subjective     HPI  Hypertension   This is a chronic problem. The current episode started more than 1 year ago. The problem has been gradually improving since onset. The problem is controlled. Pertinent negatives include no chest pain, palpitations or shortness of breath. Risk factors for coronary artery disease include obesity, sedentary lifestyle, stress, diabetes mellitus and dyslipidemia. Past treatments include calcium channel blockers and ACE inhibitors. Current antihypertension treatment includes ACE inhibitors and calcium channel blockers. The current treatment provides significant improvement. There are no compliance problems.    Diabetes   She presents for her follow-up diabetic visit. She has type 2 diabetes mellitus. Pertinent negatives for hypoglycemia include no dizziness, nervousness/anxiousness or speech difficulty. Associated symptoms include weight loss. Pertinent negatives for diabetes include no chest pain, no fatigue, no polydipsia, no polyphagia, no polyuria and no weakness. Symptoms are stable. Risk factors for coronary artery disease include diabetes mellitus, dyslipidemia, obesity, hypertension, sedentary lifestyle and stress. Current diabetic treatment includes oral agent (monotherapy). She is compliant with treatment most of the time.   Knee Pain    The injury mechanism is unknown. The pain is present in the left knee and right knee. The pain is moderate. The pain has been fluctuating since onset.  "Pertinent negatives include no numbness. The symptoms are aggravated by movement.         The following portions of the patient's history were reviewed and updated as appropriate: active problem list, medication list, allergies, family history, social history    Review of Systems:   Review of Systems   Constitutional: Positive for activity change, appetite change and unexpected weight loss. Negative for chills, diaphoresis, fatigue, fever and unexpected weight gain.   HENT: Negative for congestion, ear pain and sinus pressure.    Eyes: Negative for visual disturbance.   Respiratory: Negative for cough, chest tightness, shortness of breath and wheezing.    Cardiovascular: Negative for chest pain, palpitations and leg swelling.   Gastrointestinal: Negative for abdominal distention, abdominal pain, blood in stool, constipation, diarrhea, nausea and vomiting.   Endocrine: Negative for polydipsia, polyphagia and polyuria.   Genitourinary: Negative for menstrual problem and vaginal bleeding.   Musculoskeletal: Positive for arthralgias and joint swelling. Negative for gait problem.   Skin: Negative for color change and rash.   Allergic/Immunologic: Negative for environmental allergies.   Neurological: Negative for dizziness, syncope, speech difficulty, weakness, light-headedness, numbness and headache.   Hematological: Negative for adenopathy. Does not bruise/bleed easily.   Psychiatric/Behavioral: Negative for decreased concentration and dysphoric mood. The patient is not nervous/anxious.        Vital Signs  Vitals:    03/02/20 0839   BP: 126/84   BP Location: Left arm   Patient Position: Sitting   Cuff Size: Large Adult   Pulse: 94   SpO2: 98%   Weight: 113 kg (250 lb)   Height: 162.6 cm (64\")   PainSc:   2   PainLoc: Knee     Body mass index is 42.91 kg/m².    Imaging  No radiology results for the last 30 days.      Current Outpatient Medications:   •  amLODIPine (NORVASC) 10 MG tablet, Take 1 tablet by mouth Daily., " Disp: 90 tablet, Rfl: 3  •  buPROPion XL (WELLBUTRIN XL) 300 MG 24 hr tablet, Take 1 tablet by mouth Daily., Disp: 30 tablet, Rfl: 5  •  cetirizine (zyrTEC) 5 MG tablet, Take 5 mg by mouth Daily., Disp: , Rfl:   •  fluticasone (FLONASE) 50 MCG/ACT nasal spray, 2 sprays into the nostril(s) as directed by provider Daily., Disp: , Rfl:   •  ibuprofen (ADVIL,MOTRIN) 800 MG tablet, Take 1 tablet by mouth Every 8 (Eight) Hours As Needed for Mild Pain ., Disp: 90 tablet, Rfl: 5  •  lisinopril (PRINIVIL,ZESTRIL) 20 MG tablet, Take 1 tablet by mouth Daily., Disp: 90 tablet, Rfl: 3  •  metFORMIN ER (GLUCOPHAGE-XR) 750 MG 24 hr tablet, Take 1 tablet by mouth Daily With Breakfast., Disp: 30 tablet, Rfl: 5  •  naproxen sodium (ALEVE) 220 MG tablet, Take 220 mg by mouth 2 (Two) Times a Day As Needed., Disp: , Rfl:     Physical Exam:    Physical Exam   Constitutional: She is oriented to person, place, and time. She appears well-developed and well-nourished. No distress.   HENT:   Head: Normocephalic and atraumatic.   Right Ear: External ear normal.   Left Ear: External ear normal.   Nose: Nose normal.   Mouth/Throat: Oropharynx is clear and moist.   Eyes: Pupils are equal, round, and reactive to light. Conjunctivae and EOM are normal.   Neck: Normal range of motion. No JVD present. No thyromegaly present.   Cardiovascular: Normal rate, regular rhythm, normal heart sounds and intact distal pulses.   No murmur heard.  Pulses:       Dorsalis pedis pulses are 2+ on the right side, and 2+ on the left side.        Posterior tibial pulses are 2+ on the right side, and 2+ on the left side.   Pulmonary/Chest: Effort normal and breath sounds normal. No respiratory distress. She exhibits no tenderness.   Abdominal: Soft. Bowel sounds are normal. She exhibits no distension. There is no tenderness.   Musculoskeletal: She exhibits no edema.        Left knee: She exhibits decreased range of motion.   Neurological: She is alert and oriented to  person, place, and time. Coordination normal.   Skin: Skin is warm and dry. No rash noted. She is not diaphoretic. No erythema. No pallor.   Psychiatric: She has a normal mood and affect. Her behavior is normal. Judgment and thought content normal.   Nursing note and vitals reviewed.      Procedures        Assessment/Plan   Problem List Items Addressed This Visit        Cardiovascular and Mediastinum    Essential hypertension    Current Assessment & Plan     Hypertension is improving with treatment.  Continue current treatment regimen.  Dietary sodium restriction.  Weight loss.  Regular aerobic exercise.  Continue current medications.  Blood pressure will be reassessed at the next regular appointment.         Relevant Medications    amLODIPine (NORVASC) 10 MG tablet    lisinopril (PRINIVIL,ZESTRIL) 20 MG tablet    Other Relevant Orders    MicroAlbumin, Urine, Random - Urine, Clean Catch       Endocrine    Type 2 diabetes mellitus without complication, without long-term current use of insulin (CMS/[facility]) - Primary    Current Assessment & Plan     Diabetes is improving with treatment.   Continue current treatment regimen.  Dietary recommendations for ADA diet.  Regular aerobic exercise.  Diabetes will be reassessed in 3 months.         Relevant Medications    metFORMIN ER (GLUCOPHAGE-XR) 750 MG 24 hr tablet    Other Relevant Orders    CBC & Differential    Comprehensive Metabolic Panel       Musculoskeletal and Integument    Arthralgia of both knees    Current Assessment & Plan     Discussed importance of weight loss for knee pain.  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  Discussed healthy bending techniques.  Recommend regular exercise throughout the week.  Suggest Tylenol arthritis with knee pain is worse.         Relevant Orders    Vitamin D 25 Hydroxy       Other    Screening, lipid    Relevant Orders    Lipid Panel    Bloating    Current Assessment & Plan     Patient is trying intermittent  fasting.  She reports stomach upset and bloat when she does eat.  She reports regular bowel movements.  We will continue to monitor.         Relevant Orders    TSH    T4, Free    Vitamin B12          Return in about 3 months (around 6/2/2020) for Recheck.    Plan of care reviewed with patient at the conclusion of today's visit. Education was provided regarding diagnosis, management, and any prescribed or recommended OTC medications.Patient verbalizes understanding of and agreement with management plan.       Roberta Rodriguez PA-C    Please note that portions of this note were completed with a voice recognition program. Efforts were made to edit the dictations, but occasionally words are mistranscribed.  Answers for HPI/ROS submitted by the patient on 2/24/2020   What is the primary reason for your visit?: Other  Please describe your symptoms.: It’s for a check up with the nee medicine I have been taking. And to meet my nee doctor.  Have you had these symptoms before?: Yes  How long have you been having these symptoms?: Other

## 2020-03-02 NOTE — ASSESSMENT & PLAN NOTE
Diabetes is improving with treatment.   Continue current treatment regimen.  Dietary recommendations for ADA diet.  Regular aerobic exercise.  Diabetes will be reassessed in 3 months.

## 2020-03-02 NOTE — ASSESSMENT & PLAN NOTE
Discussed importance of weight loss for knee pain.  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  Discussed healthy bending techniques.  Recommend regular exercise throughout the week.  Suggest Tylenol arthritis with knee pain is worse.

## 2020-03-04 DIAGNOSIS — E11.9 TYPE 2 DIABETES MELLITUS WITHOUT COMPLICATION, WITHOUT LONG-TERM CURRENT USE OF INSULIN (HCC): Primary | ICD-10-CM

## 2020-03-05 LAB — HBA1C MFR BLD: 7.2 % (ref 4.8–5.6)

## 2020-03-08 DIAGNOSIS — E55.9 VITAMIN D DEFICIENCY: Primary | ICD-10-CM

## 2020-03-08 DIAGNOSIS — E11.9 TYPE 2 DIABETES MELLITUS WITHOUT COMPLICATION, WITHOUT LONG-TERM CURRENT USE OF INSULIN (HCC): ICD-10-CM

## 2020-03-08 RX ORDER — ERGOCALCIFEROL 1.25 MG/1
50000 CAPSULE ORAL WEEKLY
Qty: 4 CAPSULE | Refills: 3 | Status: SHIPPED | OUTPATIENT
Start: 2020-03-08 | End: 2020-06-01

## 2020-03-08 RX ORDER — GLIMEPIRIDE 2 MG/1
2 TABLET ORAL
Qty: 30 TABLET | Refills: 2 | Status: SHIPPED | OUTPATIENT
Start: 2020-03-08 | End: 2020-10-06

## 2020-06-01 ENCOUNTER — OFFICE VISIT (OUTPATIENT)
Dept: INTERNAL MEDICINE | Facility: CLINIC | Age: 35
End: 2020-06-01

## 2020-06-01 ENCOUNTER — LAB (OUTPATIENT)
Dept: LAB | Facility: HOSPITAL | Age: 35
End: 2020-06-01

## 2020-06-01 VITALS
SYSTOLIC BLOOD PRESSURE: 146 MMHG | HEIGHT: 64 IN | HEART RATE: 80 BPM | DIASTOLIC BLOOD PRESSURE: 100 MMHG | TEMPERATURE: 97.7 F | BODY MASS INDEX: 44.49 KG/M2 | WEIGHT: 260.6 LBS

## 2020-06-01 DIAGNOSIS — M25.552 HIP PAIN, BILATERAL: ICD-10-CM

## 2020-06-01 DIAGNOSIS — I10 ESSENTIAL HYPERTENSION: ICD-10-CM

## 2020-06-01 DIAGNOSIS — E55.9 VITAMIN D DEFICIENCY: Primary | ICD-10-CM

## 2020-06-01 DIAGNOSIS — E11.9 TYPE 2 DIABETES MELLITUS WITHOUT COMPLICATION, WITHOUT LONG-TERM CURRENT USE OF INSULIN (HCC): ICD-10-CM

## 2020-06-01 DIAGNOSIS — E66.01 MORBID OBESITY WITH BODY MASS INDEX (BMI) OF 40.0 TO 49.9 (HCC): ICD-10-CM

## 2020-06-01 DIAGNOSIS — F33.42 RECURRENT MAJOR DEPRESSIVE DISORDER, IN FULL REMISSION (HCC): ICD-10-CM

## 2020-06-01 DIAGNOSIS — E55.9 VITAMIN D DEFICIENCY: ICD-10-CM

## 2020-06-01 DIAGNOSIS — M25.551 HIP PAIN, BILATERAL: ICD-10-CM

## 2020-06-01 DIAGNOSIS — E28.2 PCOS (POLYCYSTIC OVARIAN SYNDROME): ICD-10-CM

## 2020-06-01 PROBLEM — R73.03 PREDIABETES: Status: RESOLVED | Noted: 2019-02-11 | Resolved: 2020-06-01

## 2020-06-01 PROBLEM — N92.1 MENORRHAGIA WITH IRREGULAR CYCLE: Status: ACTIVE | Noted: 2020-06-01

## 2020-06-01 LAB
25(OH)D3 SERPL-MCNC: 21.1 NG/ML (ref 30–100)
ALBUMIN SERPL-MCNC: 4.2 G/DL (ref 3.5–5.2)
ALBUMIN/GLOB SERPL: 1.3 G/DL
ALP SERPL-CCNC: 72 U/L (ref 39–117)
ALT SERPL W P-5'-P-CCNC: 13 U/L (ref 1–33)
ANION GAP SERPL CALCULATED.3IONS-SCNC: 8.6 MMOL/L (ref 5–15)
AST SERPL-CCNC: 13 U/L (ref 1–32)
BASOPHILS # BLD AUTO: 0.06 10*3/MM3 (ref 0–0.2)
BASOPHILS NFR BLD AUTO: 0.8 % (ref 0–1.5)
BILIRUB SERPL-MCNC: 0.4 MG/DL (ref 0.2–1.2)
BUN BLD-MCNC: 13 MG/DL (ref 6–20)
BUN/CREAT SERPL: 15.7 (ref 7–25)
CALCIUM SPEC-SCNC: 9.6 MG/DL (ref 8.6–10.5)
CHLORIDE SERPL-SCNC: 95 MMOL/L (ref 98–107)
CO2 SERPL-SCNC: 27.4 MMOL/L (ref 22–29)
CREAT BLD-MCNC: 0.83 MG/DL (ref 0.57–1)
DEPRECATED RDW RBC AUTO: 42.3 FL (ref 37–54)
EOSINOPHIL # BLD AUTO: 0.11 10*3/MM3 (ref 0–0.4)
EOSINOPHIL NFR BLD AUTO: 1.5 % (ref 0.3–6.2)
ERYTHROCYTE [DISTWIDTH] IN BLOOD BY AUTOMATED COUNT: 15.2 % (ref 12.3–15.4)
ERYTHROCYTE [SEDIMENTATION RATE] IN BLOOD: 18 MM/HR (ref 0–20)
GFR SERPL CREATININE-BSD FRML MDRD: 94 ML/MIN/1.73
GLOBULIN UR ELPH-MCNC: 3.2 GM/DL
GLUCOSE BLD-MCNC: 150 MG/DL (ref 65–99)
HBA1C MFR BLD: 7.5 % (ref 4.8–5.6)
HCT VFR BLD AUTO: 32.2 % (ref 34–46.6)
HGB BLD-MCNC: 10 G/DL (ref 12–15.9)
IMM GRANULOCYTES # BLD AUTO: 0.02 10*3/MM3 (ref 0–0.05)
IMM GRANULOCYTES NFR BLD AUTO: 0.3 % (ref 0–0.5)
LYMPHOCYTES # BLD AUTO: 3 10*3/MM3 (ref 0.7–3.1)
LYMPHOCYTES NFR BLD AUTO: 41.2 % (ref 19.6–45.3)
MCH RBC QN AUTO: 24.2 PG (ref 26.6–33)
MCHC RBC AUTO-ENTMCNC: 31.1 G/DL (ref 31.5–35.7)
MCV RBC AUTO: 78 FL (ref 79–97)
MONOCYTES # BLD AUTO: 0.54 10*3/MM3 (ref 0.1–0.9)
MONOCYTES NFR BLD AUTO: 7.4 % (ref 5–12)
NEUTROPHILS # BLD AUTO: 3.56 10*3/MM3 (ref 1.7–7)
NEUTROPHILS NFR BLD AUTO: 48.8 % (ref 42.7–76)
NRBC BLD AUTO-RTO: 0 /100 WBC (ref 0–0.2)
PLATELET # BLD AUTO: 435 10*3/MM3 (ref 140–450)
PMV BLD AUTO: 10.5 FL (ref 6–12)
POTASSIUM BLD-SCNC: 4.1 MMOL/L (ref 3.5–5.2)
PROT SERPL-MCNC: 7.4 G/DL (ref 6–8.5)
RBC # BLD AUTO: 4.13 10*6/MM3 (ref 3.77–5.28)
SODIUM BLD-SCNC: 131 MMOL/L (ref 136–145)
WBC NRBC COR # BLD: 7.29 10*3/MM3 (ref 3.4–10.8)

## 2020-06-01 PROCEDURE — 83036 HEMOGLOBIN GLYCOSYLATED A1C: CPT

## 2020-06-01 PROCEDURE — 99214 OFFICE O/P EST MOD 30 MIN: CPT | Performed by: PHYSICIAN ASSISTANT

## 2020-06-01 PROCEDURE — 85025 COMPLETE CBC W/AUTO DIFF WBC: CPT

## 2020-06-01 PROCEDURE — 80053 COMPREHEN METABOLIC PANEL: CPT

## 2020-06-01 PROCEDURE — 82306 VITAMIN D 25 HYDROXY: CPT

## 2020-06-01 PROCEDURE — 85652 RBC SED RATE AUTOMATED: CPT

## 2020-06-01 NOTE — ASSESSMENT & PLAN NOTE
A1c today.  Patient encouraged to take glimepiride 2 mg tablet every morning and metformin  mg in the afternoons.  Discussed low-fat low-cholesterol, low carbohydrate diet.  Discussed regular cardiovascular exercise.

## 2020-06-01 NOTE — ASSESSMENT & PLAN NOTE
Hypertension is worsening.  Dietary sodium restriction.  Weight loss.  Regular aerobic exercise.  Ambulatory blood pressure monitoring.  Blood pressure will be reassessed in 3 months.  Pt has only been taking lisinopril 20 mg in the morning.  She is advised to take lisinopril 20 in the morning and amlodipine 10mg in the evening.  She expressed understanding.

## 2020-06-01 NOTE — PATIENT INSTRUCTIONS
Take glimepiride in the morning.  Take lisinopril in the morning.  Take metformin and amlodipine in the evening.  Remember to follow up with chiropractor.  I am referring you to PT.

## 2020-06-01 NOTE — ASSESSMENT & PLAN NOTE
She has point tenderness in bilateral gluteus medius.  Can be gait issue.  She has little L-spine tenderness.  Discussed importance of weight loss, daily physical activity, and daily stretching.  She will be referred to PT and chiropractor for analysis.

## 2020-06-01 NOTE — ASSESSMENT & PLAN NOTE
Obesity is worsening.  Discussed the patient's BMI.  The BMI is above average; BMI management plan is completed.  General weight loss/lifestyle modification strategies discussed (elicit support from others; identify saboteurs; non-food rewards, etc).  Informal exercise measures discussed, e.g. taking stairs instead of elevator.  Regular aerobic exercise program discussed.   Shoot for walking 30 minutes daily.

## 2020-06-01 NOTE — PROGRESS NOTES
Patient Care Team:  Roberta Rodriguez PA-C as PCP - General (Internal Medicine)  Leann Alejo DO as Consulting Physician (Obstetrics and Gynecology)  Jorge Alberto Arellano MD as Consulting Physician (Obstetrics and Gynecology)  Mentzer, Elizabeth Kathleen, PA (Physician Assistant)    Chief Complaint::   Chief Complaint   Patient presents with   • Diabetes   • Depression   • Hypertension   • Hip Pain     bilateral        Subjective     HPI  34 year old female presents for 3 month check up on diabetes, hypertension, and vitamin D deficiency. Has not taken glimepiride or amlodipine.  The corona virus pandemic occurred right as the medications were initiated.  Pt became overwhelmed and did not take.  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  She returns to work on [address].  She is sole provider of 4-year-old daughter, which she is planning to adopt.  Has had difficulty finding time to exercise.  She did start a detox drink several days ago.  On last visit patient was found to have low hemoglobin and hematocrit.  She has history of menorrhagia, PCO S, and endometriosis.  Patient continues to have bilateral hip pain, most tender on gluteus medius.  She does have some sciatica.  She states that she is having increased pain laying on her hips at night.  She has gained 10 pounds over the pandemic.        The following portions of the patient's history were reviewed and updated as appropriate: active problem list, medication list, allergies, family history, social history    Review of Systems:   Review of Systems   Constitutional: Positive for activity change and appetite change. Negative for chills, diaphoresis, fatigue, fever, unexpected weight gain and unexpected weight loss.   HENT: Negative for congestion, ear pain and sinus pressure.    Eyes: Negative for visual disturbance.   Respiratory: Negative for cough, chest tightness, shortness of  "breath and wheezing.    Cardiovascular: Negative for chest pain, palpitations and leg swelling.   Gastrointestinal: Negative for abdominal distention, abdominal pain, blood in stool, constipation, diarrhea, nausea and vomiting.   Endocrine: Negative for cold intolerance, heat intolerance, polydipsia, polyphagia and polyuria.   Genitourinary: Positive for menstrual problem. Negative for pelvic pain.   Musculoskeletal: Positive for arthralgias, gait problem and myalgias.   Skin: Negative for color change and rash.   Allergic/Immunologic: Negative for environmental allergies.   Neurological: Negative for dizziness, syncope, speech difficulty, weakness, light-headedness, numbness and headache.   Hematological: Negative for adenopathy. Does not bruise/bleed easily.   Psychiatric/Behavioral: Negative for decreased concentration and dysphoric mood. The patient is not nervous/anxious.        Vital Signs  Vitals:    06/01/20 0832   BP: 146/100   BP Location: Right arm   Patient Position: Sitting   Cuff Size: Large Adult   Pulse: 80   Temp: 97.7 °F (36.5 °C)   TempSrc: Temporal   Weight: 118 kg (260 lb 9.6 oz)   Height: 162.6 cm (64\")   PainSc:   2   PainLoc: Hip     Body mass index is 44.73 kg/m².    Labs  Lab on 03/02/2020   Component Date Value Ref Range Status   • Glucose 03/02/2020 147* 65 - 99 mg/dL Final   • BUN 03/02/2020 11  6 - 20 mg/dL Final   • Creatinine 03/02/2020 0.84  0.57 - 1.00 mg/dL Final   • Sodium 03/02/2020 137  136 - 145 mmol/L Final   • Potassium 03/02/2020 4.3  3.5 - 5.2 mmol/L Final   • Chloride 03/02/2020 99  98 - 107 mmol/L Final   • CO2 03/02/2020 26.3  22.0 - 29.0 mmol/L Final   • Calcium 03/02/2020 9.2  8.6 - 10.5 mg/dL Final   • Total Protein 03/02/2020 7.1  6.0 - 8.5 g/dL Final   • Albumin 03/02/2020 4.30  3.50 - 5.20 g/dL Final   • ALT (SGPT) 03/02/2020 17  1 - 33 U/L Final   • AST (SGOT) 03/02/2020 16  1 - 32 U/L Final   • Alkaline Phosphatase 03/02/2020 82  39 - 117 U/L Final   • Total " Bilirubin 03/02/2020 0.4  0.2 - 1.2 mg/dL Final   • eGFR  African Amer 03/02/2020 93  >60 mL/min/1.73 Final   • Globulin 03/02/2020 2.8  gm/dL Final   • A/G Ratio 03/02/2020 1.5  g/dL Final   • BUN/Creatinine Ratio 03/02/2020 13.1  7.0 - 25.0 Final   • Anion Gap 03/02/2020 11.7  5.0 - 15.0 mmol/L Final   • Total Cholesterol 03/02/2020 169  0 - 200 mg/dL Final   • Triglycerides 03/02/2020 96  0 - 150 mg/dL Final   • HDL Cholesterol 03/02/2020 46  40 - 60 mg/dL Final   • LDL Cholesterol  03/02/2020 104* 0 - 100 mg/dL Final   • VLDL Cholesterol 03/02/2020 19.2  5 - 40 mg/dL Final   • LDL/HDL Ratio 03/02/2020 2.26   Final   • Microalbumin, Urine 03/02/2020 4.5  mg/dL Final   • TSH 03/02/2020 1.670  0.270 - 4.200 uIU/mL Final   • Free T4 03/02/2020 1.38  0.93 - 1.70 ng/dL Final   • 25 Hydroxy, Vitamin D 03/02/2020 8.2* 30.0 - 100.0 ng/ml Final   • Vitamin B-12 03/02/2020 418  211 - 946 pg/mL Final   • WBC 03/02/2020 6.84  3.40 - 10.80 10*3/mm3 Final   • RBC 03/02/2020 4.23  3.77 - 5.28 10*6/mm3 Final   • Hemoglobin 03/02/2020 10.5* 12.0 - 15.9 g/dL Final   • Hematocrit 03/02/2020 33.1* 34.0 - 46.6 % Final   • MCV 03/02/2020 78.3* 79.0 - 97.0 fL Final   • MCH 03/02/2020 24.8* 26.6 - 33.0 pg Final   • MCHC 03/02/2020 31.7  31.5 - 35.7 g/dL Final   • RDW 03/02/2020 14.7  12.3 - 15.4 % Final   • RDW-SD 03/02/2020 41.2  37.0 - 54.0 fl Final   • MPV 03/02/2020 10.6  6.0 - 12.0 fL Final   • Platelets 03/02/2020 475* 140 - 450 10*3/mm3 Final   • Neutrophil % 03/02/2020 57.1  42.7 - 76.0 % Final   • Lymphocyte % 03/02/2020 33.5  19.6 - 45.3 % Final   • Monocyte % 03/02/2020 7.0  5.0 - 12.0 % Final   • Eosinophil % 03/02/2020 1.2  0.3 - 6.2 % Final   • Basophil % 03/02/2020 0.9  0.0 - 1.5 % Final   • Immature Grans % 03/02/2020 0.3  0.0 - 0.5 % Final   • Neutrophils, Absolute 03/02/2020 3.91  1.70 - 7.00 10*3/mm3 Final   • Lymphocytes, Absolute 03/02/2020 2.29  0.70 - 3.10 10*3/mm3 Final   • Monocytes, Absolute 03/02/2020 0.48   0.10 - 0.90 10*3/mm3 Final   • Eosinophils, Absolute 03/02/2020 0.08  0.00 - 0.40 10*3/mm3 Final   • Basophils, Absolute 03/02/2020 0.06  0.00 - 0.20 10*3/mm3 Final   • Immature Grans, Absolute 03/02/2020 0.02  0.00 - 0.05 10*3/mm3 Final   • nRBC 03/02/2020 0.0  0.0 - 0.2 /100 WBC Final   • Hemoglobin A1C 03/02/2020 7.20* 4.80 - 5.60 % Final       Imaging  No radiology results for the last 30 days.      Current Outpatient Medications:   •  amLODIPine (NORVASC) 10 MG tablet, Take 1 tablet by mouth Daily., Disp: 90 tablet, Rfl: 3  •  buPROPion XL (WELLBUTRIN XL) 300 MG 24 hr tablet, Take 1 tablet by mouth Daily., Disp: 30 tablet, Rfl: 5  •  cetirizine (zyrTEC) 5 MG tablet, Take 5 mg by mouth Daily., Disp: , Rfl:   •  fluticasone (FLONASE) 50 MCG/ACT nasal spray, 2 sprays into the nostril(s) as directed by provider Daily., Disp: , Rfl:   •  glimepiride (AMARYL) 2 MG tablet, Take 1 tablet by mouth Every Morning Before Breakfast., Disp: 30 tablet, Rfl: 2  •  ibuprofen (ADVIL,MOTRIN) 800 MG tablet, Take 1 tablet by mouth Every 8 (Eight) Hours As Needed for Mild Pain ., Disp: 90 tablet, Rfl: 5  •  lisinopril (PRINIVIL,ZESTRIL) 20 MG tablet, Take 1 tablet by mouth Daily., Disp: 90 tablet, Rfl: 3  •  metFORMIN ER (GLUCOPHAGE-XR) 750 MG 24 hr tablet, Take 1 tablet by mouth Daily With Breakfast., Disp: 30 tablet, Rfl: 5    Physical Exam:    Physical Exam   Constitutional: She is oriented to person, place, and time. She appears well-developed and well-nourished.   HENT:   Head: Normocephalic and atraumatic.   Right Ear: Hearing, tympanic membrane, external ear and ear canal normal.   Left Ear: Hearing, tympanic membrane, external ear and ear canal normal.   Nose: Nose normal.   Mouth/Throat: Uvula is midline, oropharynx is clear and moist and mucous membranes are normal.   Eyes: Pupils are equal, round, and reactive to light. Conjunctivae, EOM and lids are normal.   Neck: Normal range of motion and full passive range of motion  without pain. Neck supple.   Cardiovascular: Normal rate, regular rhythm, normal heart sounds and intact distal pulses.   Pulmonary/Chest: Effort normal and breath sounds normal.   Abdominal: Soft. Normal appearance and bowel sounds are normal. There is tenderness.       Musculoskeletal: Normal range of motion. She exhibits tenderness.   Neurological: She is alert and oriented to person, place, and time. She has normal strength and normal reflexes.   Skin: Skin is warm, dry and intact.   Psychiatric: She has a normal mood and affect. Her speech is normal and behavior is normal. Judgment and thought content normal. Cognition and memory are normal.   Vitals reviewed.      Procedures        Assessment/Plan   Problem List Items Addressed This Visit        Cardiovascular and Mediastinum    Essential hypertension    Current Assessment & Plan     Hypertension is worsening.  Dietary sodium restriction.  Weight loss.  Regular aerobic exercise.  Ambulatory blood pressure monitoring.  Blood pressure will be reassessed in 3 months.  Pt has only been taking lisinopril 20 mg in the morning.  She is advised to take lisinopril 20 in the morning and amlodipine 10mg in the evening.  She expressed understanding.         Relevant Medications    amLODIPine (NORVASC) 10 MG tablet    lisinopril (PRINIVIL,ZESTRIL) 20 MG tablet    Other Relevant Orders    CBC & Differential       Digestive    Morbid obesity with body mass index (BMI) of 40.0 to 49.9 (CMS/[facility])    Current Assessment & Plan     Obesity is worsening.  Discussed the patient's BMI.  The BMI is above average; BMI management plan is completed.  General weight loss/lifestyle modification strategies discussed (elicit support from others; identify saboteurs; non-food rewards, etc).  Informal exercise measures discussed, e.g. taking stairs instead of elevator.  Regular aerobic exercise program discussed.   Shoot for walking 30 minutes daily.         Vitamin D deficiency - Primary     Relevant Orders    Vitamin D 25 Hydroxy       Endocrine    PCOS (polycystic ovarian syndrome)    Current Assessment & Plan     Taking metformin  once a day.  May increase this to 3 times a day depending on blood sugars.         Type 2 diabetes mellitus without complication, without long-term current use of insulin (CMS/[facility])    Current Assessment & Plan     A1c today.  Patient encouraged to take glimepiride 2 mg tablet every morning and metformin  mg in the afternoons.  Discussed low-fat low-cholesterol, low carbohydrate diet.  Discussed regular cardiovascular exercise.         Relevant Medications    metFORMIN ER (GLUCOPHAGE-XR) 750 MG 24 hr tablet    glimepiride (AMARYL) 2 MG tablet    Other Relevant Orders    CBC & Differential    Hemoglobin A1c    Comprehensive Metabolic Panel       Nervous and Auditory    Hip pain, bilateral    Current Assessment & Plan     She has point tenderness in bilateral gluteus medius.  Can be gait issue.  She has little L-spine tenderness.  Discussed importance of weight loss, daily physical activity, and daily stretching.  She will be referred to PT and chiropractor for analysis.         Relevant Orders    Sedimentation Rate    Ambulatory Referral to Physical Therapy Evaluate and treat       Other    Recurrent major depressive disorder, in full remission (CMS/[facility])    Current Assessment & Plan     Stable.  Continue Wellbutrin 300mg daily.  Try and get fresh air daily.         Relevant Medications    buPROPion XL (WELLBUTRIN XL) 300 MG 24 hr tablet          Return in about 3 months (around 9/1/2020) for Recheck.    Plan of care reviewed with patient at the conclusion of today's visit. Education was provided regarding diagnosis, management, and any prescribed or recommended OTC medications.Patient verbalizes understanding of and agreement with management plan.       Roberta Rodriguez PA-C    Please note that portions of this note were completed with a voice recognition  program. Efforts were made to edit the dictations, but occasionally words are mistranscribed.  Answers for HPI/ROS submitted by the patient on 5/26/2020   What is the primary reason for your visit?: Other  Please describe your symptoms.: No symptoms. But i honestly have not been taking my meds.  Have you had these symptoms before?: No  How long have you been having these symptoms?: Greater than 2 weeks

## 2020-06-02 ENCOUNTER — TELEPHONE (OUTPATIENT)
Dept: INTERNAL MEDICINE | Facility: CLINIC | Age: 35
End: 2020-06-02

## 2020-06-02 DIAGNOSIS — D50.9 IRON DEFICIENCY ANEMIA, UNSPECIFIED IRON DEFICIENCY ANEMIA TYPE: Primary | ICD-10-CM

## 2020-06-02 NOTE — TELEPHONE ENCOUNTER
Please add order.     Called [facility] @ [phone] and spoke with Dary in chemistry. Lab can be added.

## 2020-06-04 RX ORDER — AMLODIPINE BESYLATE 10 MG/1
10 TABLET ORAL DAILY
Qty: 90 TABLET | Refills: 3 | Status: SHIPPED | OUTPATIENT
Start: 2020-06-04 | End: 2021-06-28

## 2020-06-12 ENCOUNTER — TELEPHONE (OUTPATIENT)
Dept: INTERNAL MEDICINE | Facility: CLINIC | Age: 35
End: 2020-06-12

## 2020-06-14 DIAGNOSIS — E55.9 VITAMIN D DEFICIENCY: Primary | ICD-10-CM

## 2020-06-14 RX ORDER — ERGOCALCIFEROL 1.25 MG/1
50000 CAPSULE ORAL WEEKLY
Qty: 4 CAPSULE | Refills: 3 | Status: SHIPPED | OUTPATIENT
Start: 2020-06-14 | End: 2021-05-31 | Stop reason: SDUPTHER

## 2020-06-23 DIAGNOSIS — N92.4 EXCESSIVE BLEEDING IN PREMENOPAUSAL PERIOD: Primary | ICD-10-CM

## 2020-07-10 ENCOUNTER — APPOINTMENT (OUTPATIENT)
Dept: ULTRASOUND IMAGING | Facility: HOSPITAL | Age: 35
End: 2020-07-10

## 2020-07-13 ENCOUNTER — HOSPITAL ENCOUNTER (OUTPATIENT)
Dept: ULTRASOUND IMAGING | Facility: HOSPITAL | Age: 35
Discharge: HOME | End: 2020-07-13
Admitting: PHYSICIAN ASSISTANT

## 2020-07-13 DIAGNOSIS — N92.4 EXCESSIVE BLEEDING IN PREMENOPAUSAL PERIOD: ICD-10-CM

## 2020-07-13 PROCEDURE — 76830 TRANSVAGINAL US NON-OB: CPT

## 2020-07-14 ENCOUNTER — TELEPHONE (OUTPATIENT)
Dept: INTERNAL MEDICINE | Facility: CLINIC | Age: 35
End: 2020-07-14

## 2020-07-14 NOTE — TELEPHONE ENCOUNTER
Left message for patient to return call to discuss.       Result Note---  ----- Message from Roberta Rodriguez PA-C sent at 7/14/2020  9:49 AM EDT -----  enlarged uterus with several large fibroids.  This will cause her heavy periods.  She also has a complex cyst on her left ovary.  This will need to be followed by gynecology.  Does she have a gynecologist?

## 2020-07-15 DIAGNOSIS — E11.9 TYPE 2 DIABETES MELLITUS WITHOUT COMPLICATION, WITHOUT LONG-TERM CURRENT USE OF INSULIN (HCC): ICD-10-CM

## 2020-07-15 DIAGNOSIS — E55.9 VITAMIN D DEFICIENCY: Primary | ICD-10-CM

## 2020-07-15 DIAGNOSIS — Z13.220 SCREENING, LIPID: ICD-10-CM

## 2020-07-16 ENCOUNTER — TELEPHONE (OUTPATIENT)
Dept: INTERNAL MEDICINE | Facility: CLINIC | Age: 35
End: 2020-07-16

## 2020-07-16 NOTE — TELEPHONE ENCOUNTER
Patient called to let you know she has scheduled an appt with her GYN for next Friday to follow up on scan results.

## 2020-08-20 RX ORDER — LISINOPRIL 20 MG/1
20 TABLET ORAL DAILY
Qty: 90 TABLET | Refills: 1 | Status: SHIPPED | OUTPATIENT
Start: 2020-08-20 | End: 2022-04-22 | Stop reason: HOSPADM

## 2020-08-20 RX ORDER — METFORMIN HYDROCHLORIDE 750 MG/1
750 TABLET, EXTENDED RELEASE ORAL
Qty: 90 TABLET | Refills: 1 | Status: SHIPPED | OUTPATIENT
Start: 2020-08-20 | End: 2022-04-25

## 2020-10-06 DIAGNOSIS — E11.9 TYPE 2 DIABETES MELLITUS WITHOUT COMPLICATION, WITHOUT LONG-TERM CURRENT USE OF INSULIN (HCC): ICD-10-CM

## 2020-10-06 RX ORDER — GLIMEPIRIDE 2 MG/1
TABLET ORAL
Qty: 30 TABLET | Refills: 1 | Status: SHIPPED | OUTPATIENT
Start: 2020-10-06 | End: 2021-01-04

## 2020-12-03 DIAGNOSIS — F33.42 RECURRENT MAJOR DEPRESSIVE DISORDER, IN FULL REMISSION (HCC): ICD-10-CM

## 2020-12-03 RX ORDER — BUPROPION HYDROCHLORIDE 300 MG/1
300 TABLET ORAL DAILY
Qty: 30 TABLET | Refills: 5 | Status: SHIPPED | OUTPATIENT
Start: 2020-12-03 | End: 2021-05-24

## 2021-01-02 DIAGNOSIS — E11.9 TYPE 2 DIABETES MELLITUS WITHOUT COMPLICATION, WITHOUT LONG-TERM CURRENT USE OF INSULIN (HCC): ICD-10-CM

## 2021-01-04 RX ORDER — GLIMEPIRIDE 2 MG/1
TABLET ORAL
Qty: 60 TABLET | Refills: 0 | Status: SHIPPED | OUTPATIENT
Start: 2021-01-04 | End: 2021-01-26

## 2021-01-04 NOTE — TELEPHONE ENCOUNTER
I will refill for now, but patient has not had A1c since January 2020.  She needs to come in within 1 month.  Thanks

## 2021-01-16 NOTE — ASSESSMENT & PLAN NOTE
Hypertension is worsening.   She is taking amilodipine 10 mg once a day.   She started lisinopril 10 mg 7 days ago.   She is not check her BP.  
yes

## 2021-01-20 ENCOUNTER — LAB (OUTPATIENT)
Dept: LAB | Facility: HOSPITAL | Age: 36
End: 2021-01-20

## 2021-01-20 DIAGNOSIS — E55.9 VITAMIN D DEFICIENCY: ICD-10-CM

## 2021-01-20 DIAGNOSIS — Z13.220 SCREENING, LIPID: ICD-10-CM

## 2021-01-20 DIAGNOSIS — E11.9 TYPE 2 DIABETES MELLITUS WITHOUT COMPLICATION, WITHOUT LONG-TERM CURRENT USE OF INSULIN (HCC): ICD-10-CM

## 2021-01-20 LAB
25(OH)D3 SERPL-MCNC: 22.9 NG/ML (ref 30–100)
ALBUMIN SERPL-MCNC: 4.3 G/DL (ref 3.5–5.2)
ALBUMIN/GLOB SERPL: 1.3 G/DL
ALP SERPL-CCNC: 89 U/L (ref 39–117)
ALT SERPL W P-5'-P-CCNC: 18 U/L (ref 1–33)
ANION GAP SERPL CALCULATED.3IONS-SCNC: 14 MMOL/L (ref 5–15)
AST SERPL-CCNC: 18 U/L (ref 1–32)
BASOPHILS # BLD AUTO: 0.04 10*3/MM3 (ref 0–0.2)
BASOPHILS NFR BLD AUTO: 0.5 % (ref 0–1.5)
BILIRUB SERPL-MCNC: 0.3 MG/DL (ref 0–1.2)
BUN SERPL-MCNC: 7 MG/DL (ref 6–20)
BUN/CREAT SERPL: 9.7 (ref 7–25)
CALCIUM SPEC-SCNC: 9.2 MG/DL (ref 8.6–10.5)
CHLORIDE SERPL-SCNC: 99 MMOL/L (ref 98–107)
CHOLEST SERPL-MCNC: 173 MG/DL (ref 0–200)
CO2 SERPL-SCNC: 25 MMOL/L (ref 22–29)
CREAT SERPL-MCNC: 0.72 MG/DL (ref 0.57–1)
DEPRECATED RDW RBC AUTO: 43.3 FL (ref 37–54)
EOSINOPHIL # BLD AUTO: 0.08 10*3/MM3 (ref 0–0.4)
EOSINOPHIL NFR BLD AUTO: 1.1 % (ref 0.3–6.2)
ERYTHROCYTE [DISTWIDTH] IN BLOOD BY AUTOMATED COUNT: 15.5 % (ref 12.3–15.4)
GFR SERPL CREATININE-BSD FRML MDRD: 110 ML/MIN/1.73
GLOBULIN UR ELPH-MCNC: 3.2 GM/DL
GLUCOSE SERPL-MCNC: 181 MG/DL (ref 65–99)
HCT VFR BLD AUTO: 34.2 % (ref 34–46.6)
HDLC SERPL-MCNC: 45 MG/DL (ref 40–60)
HGB BLD-MCNC: 10.8 G/DL (ref 12–15.9)
IMM GRANULOCYTES # BLD AUTO: 0.03 10*3/MM3 (ref 0–0.05)
IMM GRANULOCYTES NFR BLD AUTO: 0.4 % (ref 0–0.5)
LDLC SERPL CALC-MCNC: 109 MG/DL (ref 0–100)
LDLC/HDLC SERPL: 2.37 {RATIO}
LYMPHOCYTES # BLD AUTO: 2.45 10*3/MM3 (ref 0.7–3.1)
LYMPHOCYTES NFR BLD AUTO: 33 % (ref 19.6–45.3)
MCH RBC QN AUTO: 24.7 PG (ref 26.6–33)
MCHC RBC AUTO-ENTMCNC: 31.6 G/DL (ref 31.5–35.7)
MCV RBC AUTO: 78.3 FL (ref 79–97)
MONOCYTES # BLD AUTO: 0.51 10*3/MM3 (ref 0.1–0.9)
MONOCYTES NFR BLD AUTO: 6.9 % (ref 5–12)
NEUTROPHILS NFR BLD AUTO: 4.31 10*3/MM3 (ref 1.7–7)
NEUTROPHILS NFR BLD AUTO: 58.1 % (ref 42.7–76)
NRBC BLD AUTO-RTO: 0 /100 WBC (ref 0–0.2)
PLATELET # BLD AUTO: 518 10*3/MM3 (ref 140–450)
PMV BLD AUTO: 10.5 FL (ref 6–12)
POTASSIUM SERPL-SCNC: 4 MMOL/L (ref 3.5–5.2)
PROT SERPL-MCNC: 7.5 G/DL (ref 6–8.5)
RBC # BLD AUTO: 4.37 10*6/MM3 (ref 3.77–5.28)
SODIUM SERPL-SCNC: 138 MMOL/L (ref 136–145)
TRIGL SERPL-MCNC: 107 MG/DL (ref 0–150)
VLDLC SERPL-MCNC: 19 MG/DL (ref 5–40)
WBC # BLD AUTO: 7.42 10*3/MM3 (ref 3.4–10.8)

## 2021-01-20 PROCEDURE — 85025 COMPLETE CBC W/AUTO DIFF WBC: CPT

## 2021-01-20 PROCEDURE — 80061 LIPID PANEL: CPT

## 2021-01-20 PROCEDURE — 80053 COMPREHEN METABOLIC PANEL: CPT

## 2021-01-20 PROCEDURE — 83036 HEMOGLOBIN GLYCOSYLATED A1C: CPT

## 2021-01-20 PROCEDURE — 82306 VITAMIN D 25 HYDROXY: CPT

## 2021-01-21 LAB — HBA1C MFR BLD: 7.89 % (ref 4.8–5.6)

## 2021-01-26 ENCOUNTER — OFFICE VISIT (OUTPATIENT)
Dept: INTERNAL MEDICINE | Facility: CLINIC | Age: 36
End: 2021-01-26

## 2021-01-26 VITALS
SYSTOLIC BLOOD PRESSURE: 132 MMHG | TEMPERATURE: 97.1 F | DIASTOLIC BLOOD PRESSURE: 98 MMHG | BODY MASS INDEX: 44.9 KG/M2 | WEIGHT: 263 LBS | HEART RATE: 95 BPM | HEIGHT: 64 IN

## 2021-01-26 DIAGNOSIS — I10 ESSENTIAL HYPERTENSION: Primary | ICD-10-CM

## 2021-01-26 DIAGNOSIS — E11.9 TYPE 2 DIABETES MELLITUS WITHOUT COMPLICATION, WITHOUT LONG-TERM CURRENT USE OF INSULIN (HCC): ICD-10-CM

## 2021-01-26 DIAGNOSIS — N92.0 MENORRHAGIA WITH REGULAR CYCLE: ICD-10-CM

## 2021-01-26 DIAGNOSIS — E66.01 MORBID OBESITY WITH BODY MASS INDEX (BMI) OF 40.0 TO 49.9 (HCC): ICD-10-CM

## 2021-01-26 DIAGNOSIS — F33.42 RECURRENT MAJOR DEPRESSIVE DISORDER, IN FULL REMISSION (HCC): ICD-10-CM

## 2021-01-26 PROCEDURE — 99214 OFFICE O/P EST MOD 30 MIN: CPT | Performed by: PHYSICIAN ASSISTANT

## 2021-01-26 RX ORDER — BUDESONIDE AND FORMOTEROL FUMARATE DIHYDRATE 160; 4.5 UG/1; UG/1
2 AEROSOL RESPIRATORY (INHALATION)
COMMUNITY
Start: 2020-11-07 | End: 2022-04-25

## 2021-01-26 NOTE — PROGRESS NOTES
Patient Care Team:  Roberta Rodriguez PA-C as PCP - General (Internal Medicine)  Leann Alejo DO as Consulting Physician (Obstetrics and Gynecology)  Jorge Alberto Arellano MD as Consulting Physician (Obstetrics and Gynecology)  Mentzer, Elizabeth Kathleen, PA (Physician Assistant)    Chief Complaint::   Chief Complaint   Patient presents with   • Diabetes     f/u         Subjective     HPI  35 year old female presents for follow up on hypertension, depression, hyperlipidemia, morbid obesity, type 2 diabetes, and anemia.  Most recent labs show A1c of 7.89.  She had discontinued glimepiride due to dizziness and headaches.  Continues to take Metformin 750 mg XR tablets once daily.  She has right higher dose, but unable to tolerate due to GI side effects.  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  Currently unemployed.  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  She is due to graduate in May.  Does find that she is more sedentary, tries to eat more at home.  Difficulty trying to find things that are healthy and that her 5-year-old daughter will eat.  Recent labs also showed decreased vitamin D, and hemoglobin.  She has chronic anemia has had follow-up with gynecologist due to concerns for menorrhagia.  She has seen Dr. Jorge Alberto Arellano for further evaluation.  She has been diagnosed with fibroids, PCOS, and endometriosis.  He did not recommend IUD, encourage patient to lose weight.          The following portions of the patient's history were reviewed and updated as appropriate: active problem list, medication list, allergies, family history, social history    Review of Systems:   Review of Systems   Constitutional: Negative for activity change, appetite change, diaphoresis, fatigue, unexpected weight gain and unexpected weight loss.   HENT: Negative for hearing loss.    Eyes: Negative for blurred vision, double vision  "and visual disturbance.   Respiratory: Negative for cough, chest tightness and shortness of breath.    Cardiovascular: Negative for chest pain, palpitations and leg swelling.   Gastrointestinal: Negative for abdominal distention, abdominal pain, blood in stool, diarrhea, nausea, vomiting, GERD and indigestion.   Endocrine: Negative for cold intolerance, heat intolerance, polydipsia, polyphagia and polyuria.   Genitourinary: Negative for dysuria and hematuria.   Musculoskeletal: Negative for arthralgias and myalgias.   Skin: Negative for color change, rash and skin lesions.   Neurological: Negative for dizziness, tremors, seizures, syncope, speech difficulty, weakness, light-headedness, numbness, headache, memory problem and confusion.   Hematological: Does not bruise/bleed easily.   Psychiatric/Behavioral: Negative for dysphoric mood, sleep disturbance and depressed mood. The patient is not nervous/anxious.        Vital Signs  Vitals:    01/26/21 1330   BP: 132/98   BP Location: Right arm   Patient Position: Sitting   Cuff Size: Large Adult   Pulse: 95   Temp: 97.1 °F (36.2 °C)   TempSrc: Temporal   Weight: 119 kg (263 lb)   Height: 162.5 cm (63.98\")   PainSc: 0-No pain     Body mass index is 45.18 kg/m².    Labs  Lab on 01/20/2021   Component Date Value Ref Range Status   • Hemoglobin A1C 01/20/2021 7.89* 4.80 - 5.60 % Final   • Glucose 01/20/2021 181* 65 - 99 mg/dL Final   • BUN 01/20/2021 7  6 - 20 mg/dL Final   • Creatinine 01/20/2021 0.72  0.57 - 1.00 mg/dL Final   • Sodium 01/20/2021 138  136 - 145 mmol/L Final   • Potassium 01/20/2021 4.0  3.5 - 5.2 mmol/L Final   • Chloride 01/20/2021 99  98 - 107 mmol/L Final   • CO2 01/20/2021 25.0  22.0 - 29.0 mmol/L Final   • Calcium 01/20/2021 9.2  8.6 - 10.5 mg/dL Final   • Total Protein 01/20/2021 7.5  6.0 - 8.5 g/dL Final   • Albumin 01/20/2021 4.30  3.50 - 5.20 g/dL Final   • ALT (SGPT) 01/20/2021 18  1 - 33 U/L Final   • AST (SGOT) 01/20/2021 18  1 - 32 U/L Final "   • Alkaline Phosphatase 01/20/2021 89  39 - 117 U/L Final   • Total Bilirubin 01/20/2021 0.3  0.0 - 1.2 mg/dL Final   • eGFR   Amer 01/20/2021 110  >60 mL/min/1.73 Final   • Globulin 01/20/2021 3.2  gm/dL Final   • A/G Ratio 01/20/2021 1.3  g/dL Final   • BUN/Creatinine Ratio 01/20/2021 9.7  7.0 - 25.0 Final   • Anion Gap 01/20/2021 14.0  5.0 - 15.0 mmol/L Final   • 25 Hydroxy, Vitamin D 01/20/2021 22.9* 30.0 - 100.0 ng/ml Final   • Total Cholesterol 01/20/2021 173  0 - 200 mg/dL Final   • Triglycerides 01/20/2021 107  0 - 150 mg/dL Final   • HDL Cholesterol 01/20/2021 45  40 - 60 mg/dL Final   • LDL Cholesterol  01/20/2021 109* 0 - 100 mg/dL Final   • VLDL Cholesterol 01/20/2021 19  5 - 40 mg/dL Final   • LDL/HDL Ratio 01/20/2021 2.37   Final   • WBC 01/20/2021 7.42  3.40 - 10.80 10*3/mm3 Final   • RBC 01/20/2021 4.37  3.77 - 5.28 10*6/mm3 Final   • Hemoglobin 01/20/2021 10.8* 12.0 - 15.9 g/dL Final   • Hematocrit 01/20/2021 34.2  34.0 - 46.6 % Final   • MCV 01/20/2021 78.3* 79.0 - 97.0 fL Final   • MCH 01/20/2021 24.7* 26.6 - 33.0 pg Final   • MCHC 01/20/2021 31.6  31.5 - 35.7 g/dL Final   • RDW 01/20/2021 15.5* 12.3 - 15.4 % Final   • RDW-SD 01/20/2021 43.3  37.0 - 54.0 fl Final   • MPV 01/20/2021 10.5  6.0 - 12.0 fL Final   • Platelets 01/20/2021 518* 140 - 450 10*3/mm3 Final   • Neutrophil % 01/20/2021 58.1  42.7 - 76.0 % Final   • Lymphocyte % 01/20/2021 33.0  19.6 - 45.3 % Final   • Monocyte % 01/20/2021 6.9  5.0 - 12.0 % Final   • Eosinophil % 01/20/2021 1.1  0.3 - 6.2 % Final   • Basophil % 01/20/2021 0.5  0.0 - 1.5 % Final   • Immature Grans % 01/20/2021 0.4  0.0 - 0.5 % Final   • Neutrophils, Absolute 01/20/2021 4.31  1.70 - 7.00 10*3/mm3 Final   • Lymphocytes, Absolute 01/20/2021 2.45  0.70 - 3.10 10*3/mm3 Final   • Monocytes, Absolute 01/20/2021 0.51  0.10 - 0.90 10*3/mm3 Final   • Eosinophils, Absolute 01/20/2021 0.08  0.00 - 0.40 10*3/mm3 Final   • Basophils, Absolute 01/20/2021 0.04  0.00 -  0.20 10*3/mm3 Final   • Immature Grans, Absolute 01/20/2021 0.03  0.00 - 0.05 10*3/mm3 Final   • nRBC 01/20/2021 0.0  0.0 - 0.2 /100 WBC Final       Imaging  No radiology results for the last 30 days.      Current Outpatient Medications:   •  amLODIPine (NORVASC) 10 MG tablet, Take 1 tablet by mouth Daily., Disp: 90 tablet, Rfl: 3  •  buPROPion XL (WELLBUTRIN XL) 300 MG 24 hr tablet, Take 1 tablet by mouth Daily., Disp: 30 tablet, Rfl: 5  •  cetirizine (zyrTEC) 5 MG tablet, Take 5 mg by mouth Daily., Disp: , Rfl:   •  fluticasone (FLONASE) 50 MCG/ACT nasal spray, 2 sprays into the nostril(s) as directed by provider Daily., Disp: , Rfl:   •  ibuprofen (ADVIL,MOTRIN) 800 MG tablet, Take 1 tablet by mouth Every 8 (Eight) Hours As Needed for Mild Pain ., Disp: 90 tablet, Rfl: 5  •  lisinopril (PRINIVIL,ZESTRIL) 20 MG tablet, Take 1 tablet by mouth Daily., Disp: 90 tablet, Rfl: 1  •  metFORMIN ER (GLUCOPHAGE-XR) 750 MG 24 hr tablet, Take 1 tablet by mouth Daily With Breakfast., Disp: 90 tablet, Rfl: 1  •  vitamin D (ERGOCALCIFEROL) 1.25 MG (49845 UT) capsule capsule, Take 1 capsule by mouth 1 (One) Time Per Week., Disp: 4 capsule, Rfl: 3  •  budesonide-formoterol (SYMBICORT) 160-4.5 MCG/ACT inhaler, , Disp: , Rfl:   •  Empagliflozin 10 MG tablet, Take 10 mg by mouth Daily., Disp: 30 tablet, Rfl: 2    Physical Exam:    Physical Exam  Vitals signs reviewed.   Constitutional:       Appearance: Normal appearance. She is well-developed.   HENT:      Head: Normocephalic and atraumatic.      Right Ear: Hearing, tympanic membrane, ear canal and external ear normal.      Left Ear: Hearing, tympanic membrane, ear canal and external ear normal.      Nose: Nose normal.      Mouth/Throat:      Pharynx: Uvula midline.   Eyes:      General: Lids are normal.      Conjunctiva/sclera: Conjunctivae normal.      Pupils: Pupils are equal, round, and reactive to light.   Neck:      Musculoskeletal: Full passive range of motion without pain,  normal range of motion and neck supple.   Cardiovascular:      Rate and Rhythm: Normal rate and regular rhythm.      Heart sounds: Normal heart sounds.   Pulmonary:      Effort: Pulmonary effort is normal.      Breath sounds: Normal breath sounds.   Abdominal:      General: Bowel sounds are normal.      Palpations: Abdomen is soft.   Musculoskeletal: Normal range of motion.   Skin:     General: Skin is warm and dry.   Neurological:      Mental Status: She is alert and oriented to person, place, and time.      Deep Tendon Reflexes: Reflexes are normal and symmetric.   Psychiatric:         Speech: Speech normal.         Behavior: Behavior normal.         Thought Content: Thought content normal.         Judgment: Judgment normal.         Procedures        Assessment/Plan   Problem List Items Addressed This Visit        Cardiac and Vasculature    Essential hypertension - Primary    Overview     Continue amlodipine 10 mg tablets and lisinopril 10 mg tablets daily.         Current Assessment & Plan     Hypertension is improving with treatment.  Continue current treatment regimen.  Dietary sodium restriction.  Weight loss.  Regular aerobic exercise.  Continue current medications.  Blood pressure will be reassessed at the next regular appointment.         Relevant Medications    amLODIPine (NORVASC) 10 MG tablet    lisinopril (PRINIVIL,ZESTRIL) 20 MG tablet       Endocrine and Metabolic    Morbid obesity with body mass index (BMI) of 40.0 to 49.9 (CMS/[facility])    Overview     Body mass index is 45.18 kg/m². Discussed importance of weight loss.  Recommend low fat, low calorie diet.  Recommend daily cardiovascular exercise. Goal is to lose 5 lbs in one month.            Type 2 diabetes mellitus without complication, without long-term current use of insulin (CMS/[facility])    Overview     Continue Metformin 750 mg daily.  Patient unable to tolerate glimepiride.         Current Assessment & Plan     Diabetes is unchanged.   Reminded to  bring in blood sugar diary at next visit.  Dietary recommendations for ADA diet.  Regular aerobic exercise.  Medication changes per orders.  Diabetes will be reassessed in 6 months.         Relevant Medications    metFORMIN ER (GLUCOPHAGE-XR) 750 MG 24 hr tablet    Empagliflozin 10 MG tablet       Genitourinary and Reproductive     Menorrhagia with regular cycle    Overview     Likely due to fibroid uterus.  She has had consultation with gynecology.  I do feel her heavy periods may be the source of her decreasing hemoglobin.  We will continue to monitor.  Gynecologist did recommend hysterectomy, but patient wants to see if she can biologically have children.            Mental Health    Recurrent major depressive disorder, in full remission (CMS/[facility])    Overview     She is currently taking Wellbutrin XL.         Relevant Medications    buPROPion XL (WELLBUTRIN XL) 300 MG 24 hr tablet          Return in about 6 months (around 7/26/2021) for Recheck.    Plan of care reviewed with patient at the conclusion of today's visit. Education was provided regarding diagnosis, management, and any prescribed or recommended OTC medications.Patient verbalizes understanding of and agreement with management plan.       Roberta Rodriguez PA-C    Please note that portions of this note were completed with a voice recognition program. Efforts were made to edit the dictations, but occasionally words are mistranscribed.

## 2021-01-26 NOTE — PATIENT INSTRUCTIONS
"BMI for Adults  What is BMI?  Body mass index (BMI) is a number that is calculated from a person's weight and height. BMI can help estimate how much of a person's weight is composed of fat. BMI does not measure body fat directly. Rather, it is an alternative to procedures that directly measure body fat, which can be difficult and expensive.  BMI can help identify people who may be at higher risk for certain medical problems.  What are BMI measurements used for?  BMI is used as a screening tool to identify possible weight problems. It helps determine whether a person is obese, overweight, a healthy weight, or underweight.  BMI is useful for:  · Identifying a weight problem that may be related to a medical condition or may increase the risk for medical problems.  · Promoting changes, such as changes in diet and exercise, to help reach a healthy weight. BMI screening can be repeated to see if these changes are working.  How is BMI calculated?  BMI involves measuring your weight in relation to your height. Both height and weight are measured, and the BMI is calculated from those numbers. This can be done either in English ([place] or metric measurements. Note that charts and online BMI calculators are available to help you find your BMI quickly and easily without having to do these calculations yourself.  To calculate your BMI in English ([place] measurements:    1. Measure your weight in pounds (lb).  2. Multiply the number of pounds by 703.  ? For example, for a person who weighs 180 lb, multiply that number by 703, which equals 126,540.  3. Measure your height in inches. Then multiply that number by itself to get a measurement called \"inches squared.\"  ? For example, for a person who is 70 inches tall, the \"inches squared\" measurement is 70 inches x 70 inches, which equals 4,900 inches squared.  4. Divide the total from step 2 (number of lb x 703) by the total from step 3 (inches squared): 126,540 ÷ 4,900 = 25.8. This is " "your BMI.  To calculate your BMI in metric measurements:  1. Measure your weight in kilograms (kg).  2. Measure your height in meters (m). Then multiply that number by itself to get a measurement called \"meters squared.\"  ? For example, for a person who is 1.75 m tall, the \"meters squared\" measurement is 1.75 m x 1.75 m, which is equal to 3.1 meters squared.  3. Divide the number of kilograms (your weight) by the meters squared number. In this example: 70 ÷ 3.1 = 22.6. This is your BMI.  What do the results mean?  BMI charts are used to identify whether you are underweight, normal weight, overweight, or obese. The following guidelines will be used:  · Underweight: BMI less than 18.5.  · Normal weight: BMI between 18.5 and 24.9.  · Overweight: BMI between 25 and 29.9.  · Obese: BMI of 30 or above.  Keep these notes in mind:  · Weight includes both fat and muscle, so someone with a muscular build, such as an athlete, may have a BMI that is higher than 24.9. In cases like these, BMI is not an accurate measure of body fat.  · To determine if excess body fat is the cause of a BMI of 25 or higher, further assessments may need to be done by a health care provider.  · BMI is usually interpreted in the same way for men and women.  Where to find more information  For more information about BMI, including tools to quickly calculate your BMI, go to these websites:  · Centers for Disease Control and Prevention: www.cdc.gov  · American Heart Association: www.heart.org  · National Heart, Lung, and Blood [place]: www.nhlbi.nih.gov  Summary  · Body mass index (BMI) is a number that is calculated from a person's weight and height.  · BMI may help estimate how much of a person's weight is composed of fat. BMI can help identify those who may be at higher risk for certain medical problems.  · BMI can be measured using English measurements or metric measurements.  · BMI charts are used to identify whether you are underweight, normal " weight, overweight, or obese.  This information is not intended to replace advice given to you by your health care provider. Make sure you discuss any questions you have with your health care provider.  Document Revised: 09/09/2020 Document Reviewed: 07/17/2020  Elsevier Patient Education © 2020 [employer]      Major Depressive Disorder, Adult  Major depressive disorder (MDD) is a mental health condition. It may also be called clinical depression or unipolar depression. MDD usually causes feelings of sadness, hopelessness, or helplessness. MDD can also cause physical symptoms. It can interfere with work, school, relationships, and other everyday activities. MDD may be mild, moderate, or severe. It may occur once (single episode major depressive disorder) or it may occur multiple times (recurrent major depressive disorder).  What are the causes?  The exact cause of this condition is not known. MDD is most likely caused by a combination of things, which may include:  · Genetic factors. These are traits that are passed along from parent to child.  · Individual factors. Your personality, your behavior, and the way you handle your thoughts and feelings may contribute to MDD. This includes personality traits and behaviors learned from others.  · Physical factors, such as:  ? Differences in the part of your brain that controls emotion. This part of your brain may be different than it is in people who do not have MDD.  ? Long-term (chronic) medical or psychiatric illnesses.  · Social factors. Traumatic experiences or major life changes may play a role in the development of MDD.  What increases the risk?  This condition is more likely to develop in women. The following factors may also make you more likely to develop MDD:  · A family history of depression.  · Troubled family relationships.  · Abnormally low levels of certain brain chemicals.  · Traumatic events in childhood, especially abuse or the loss of a  parent.  · Being under a lot of stress, or long-term stress, especially from upsetting life experiences or losses.  · A history of:  ? Chronic physical illness.  ? Other mental health disorders.  ? Substance abuse.  · Poor living conditions.  · Experiencing social exclusion or discrimination on a regular basis.  What are the signs or symptoms?  The main symptoms of MDD typically include:  · Constant depressed or irritable mood.  · Loss of interest in things and activities.  MDD symptoms may also include:  · Sleeping or eating too much or too little.  · Unexplained weight change.  · Fatigue or low energy.  · Feelings of worthlessness or guilt.  · Difficulty thinking clearly or making decisions.  · Thoughts of suicide or of harming others.  · Physical agitation or weakness.  · Isolation.  Severe cases of MDD may also occur with other symptoms, such as:  · Delusions or hallucinations, in which you imagine things that are not real (psychotic depression).  · Low-level depression that lasts at least a year (chronic depression or persistent depressive disorder).  · Extreme sadness and hopelessness (melancholic depression).  · Trouble speaking and moving (catatonic depression).  How is this diagnosed?  This condition may be diagnosed based on:  · Your symptoms.  · Your medical history, including your mental health history. This may involve tests to evaluate your mental health. You may be asked questions about your lifestyle, including any drug and alcohol use, and how long you have had symptoms of MDD.  · A physical exam.  · Blood tests to rule out other conditions.  You must have a depressed mood and at least four other MDD symptoms most of the day, nearly every day in the same 2-week timeframe before your health care provider can confirm a diagnosis of MDD.  How is this treated?  This condition is usually treated by mental health professionals, such as psychologists, psychiatrists, and clinical social workers. You may  need more than one type of treatment. Treatment may include:  · Psychotherapy. This is also called talk therapy or counseling. Types of psychotherapy include:  ? Cognitive behavioral therapy (CBT). This type of therapy teaches you to recognize unhealthy feelings, thoughts, and behaviors, and replace them with positive thoughts and actions.  ? Interpersonal therapy (IPT). This helps you to improve the way you relate to and communicate with others.  ? Family therapy. This treatment includes members of your family.  · Medicine to treat anxiety and depression, or to help you control certain emotions and behaviors.  · Lifestyle changes, such as:  ? Limiting alcohol and drug use.  ? Exercising regularly.  ? Getting plenty of sleep.  ? Making healthy eating choices.  ? Spending more time outdoors.    Treatments involving stimulation of the brain can be used in situations with extremely severe symptoms, or when medicine or other therapies do not work over time. These treatments include electroconvulsive therapy, transcranial magnetic stimulation, and vagal nerve stimulation.  Follow these instructions at home:  Activity  · Return to your normal activities as told by your health care provider.  · Exercise regularly and spend time outdoors as told by your health care provider.  General instructions  · Take over-the-counter and prescription medicines only as told by your health care provider.  · Do not drink alcohol. If you drink alcohol, limit your alcohol intake to no more than 1 drink a day for nonpregnant women and 2 drinks a day for men. One drink equals 12 oz of beer, 5 oz of wine, or 1½ oz of hard liquor. Alcohol can affect any antidepressant medicines you are taking. Talk to your health care provider about your alcohol use.  · Eat a healthy diet and get plenty of sleep.  · Find activities that you enjoy doing, and make time to do them.  · Consider joining a support group. Your health care provider may be able to  recommend a support group.  · Keep all follow-up visits as told by your health care provider. This is important.  Where to find more information  National [place] on Mental Illness  · www.justice.org  [place] National [place] of Mental Health  · www.nimh.nih.gov  National Suicide Prevention Lifeline  · 4-200-137-TALK (8933). This is free, 24-hour help.  Contact a health care provider if:  · Your symptoms get worse.  · You develop new symptoms.  Get help right away if:  · You self-harm.  · You have serious thoughts about hurting yourself or others.  · You see, hear, taste, smell, or feel things that are not present (hallucinate).  This information is not intended to replace advice given to you by your health care provider. Make sure you discuss any questions you have with your health care provider.  Document Revised: 11/30/2018 Document Reviewed: 06/28/2017  Elsevier Patient Education © 2020 [employer]

## 2021-01-28 PROBLEM — N92.0 MENORRHAGIA WITH REGULAR CYCLE: Status: ACTIVE | Noted: 2020-06-01

## 2021-01-29 ENCOUNTER — TELEPHONE (OUTPATIENT)
Dept: INTERNAL MEDICINE | Facility: CLINIC | Age: 36
End: 2021-01-29

## 2021-04-22 ENCOUNTER — IMMUNIZATION (OUTPATIENT)
Dept: VACCINE CLINIC | Facility: HOSPITAL | Age: 36
End: 2021-04-22

## 2021-04-22 PROCEDURE — 0001A: CPT | Performed by: INTERNAL MEDICINE

## 2021-04-22 PROCEDURE — 91300 HC SARSCOV02 VAC 30MCG/0.3ML IM: CPT | Performed by: INTERNAL MEDICINE

## 2021-05-12 ENCOUNTER — IMMUNIZATION (OUTPATIENT)
Dept: VACCINE CLINIC | Facility: HOSPITAL | Age: 36
End: 2021-05-12

## 2021-05-12 PROCEDURE — 91300 HC SARSCOV02 VAC 30MCG/0.3ML IM: CPT | Performed by: INTERNAL MEDICINE

## 2021-05-12 PROCEDURE — 0002A: CPT | Performed by: INTERNAL MEDICINE

## 2021-05-24 ENCOUNTER — OFFICE VISIT (OUTPATIENT)
Dept: INTERNAL MEDICINE | Facility: CLINIC | Age: 36
End: 2021-05-24

## 2021-05-24 VITALS
SYSTOLIC BLOOD PRESSURE: 146 MMHG | WEIGHT: 263 LBS | HEIGHT: 64 IN | HEART RATE: 116 BPM | TEMPERATURE: 98.4 F | BODY MASS INDEX: 44.9 KG/M2 | DIASTOLIC BLOOD PRESSURE: 102 MMHG

## 2021-05-24 DIAGNOSIS — J45.20 MILD INTERMITTENT ASTHMA WITHOUT COMPLICATION: ICD-10-CM

## 2021-05-24 DIAGNOSIS — R13.10 DYSPHAGIA, UNSPECIFIED TYPE: Primary | ICD-10-CM

## 2021-05-24 DIAGNOSIS — I10 ESSENTIAL HYPERTENSION: ICD-10-CM

## 2021-05-24 DIAGNOSIS — E55.9 VITAMIN D DEFICIENCY: ICD-10-CM

## 2021-05-24 DIAGNOSIS — E11.9 TYPE 2 DIABETES MELLITUS WITHOUT COMPLICATION, WITHOUT LONG-TERM CURRENT USE OF INSULIN (HCC): ICD-10-CM

## 2021-05-24 DIAGNOSIS — Z13.21 ENCOUNTER FOR VITAMIN DEFICIENCY SCREENING: ICD-10-CM

## 2021-05-24 DIAGNOSIS — N92.0 MENORRHAGIA WITH REGULAR CYCLE: ICD-10-CM

## 2021-05-24 DIAGNOSIS — E66.01 MORBID OBESITY WITH BODY MASS INDEX (BMI) OF 40.0 TO 49.9 (HCC): ICD-10-CM

## 2021-05-24 DIAGNOSIS — F33.42 RECURRENT MAJOR DEPRESSIVE DISORDER, IN FULL REMISSION (HCC): ICD-10-CM

## 2021-05-24 DIAGNOSIS — Z13.220 SCREENING, LIPID: ICD-10-CM

## 2021-05-24 PROCEDURE — 99214 OFFICE O/P EST MOD 30 MIN: CPT | Performed by: PHYSICIAN ASSISTANT

## 2021-05-24 RX ORDER — CETIRIZINE HYDROCHLORIDE 10 MG/1
1 TABLET ORAL DAILY
COMMUNITY
Start: 2021-04-30 | End: 2022-04-25 | Stop reason: SDUPTHER

## 2021-05-24 RX ORDER — ALBUTEROL SULFATE 90 UG/1
2 AEROSOL, METERED RESPIRATORY (INHALATION) EVERY 4 HOURS PRN
COMMUNITY
Start: 2021-04-30 | End: 2022-04-20

## 2021-05-24 NOTE — PROGRESS NOTES
"Patient Care Team:  Roberta Rodriguez PA-C as PCP - General (Internal Medicine)  Leann Alejo DO as Consulting Physician (Obstetrics and Gynecology)  Jorge Alberto Arellano MD as Consulting Physician (Obstetrics and Gynecology)  Mentzer, Elizabeth Kathleen, PA (Physician Assistant)    Chief Complaint::   Chief Complaint   Patient presents with   • GI Problem        Subjective     HPI  Deanna is a 35 year old female with history of hypertension, morbid obesity, type 2 diabetes, menorrhagia, and asthma, who presents for followup.  She has been on unemployment, since the  facility shutdown due to Covid. She did graduate with BA in Family and child studies.  She is looking online for jobs. Wanting to apply with state.  She has started behavioral health therapy.  Increase amount of stress.  Wanting to start the adoptive process for her daughter, who is 5.  She has had this child since the child was 1.    She reports she is non compliant with her medications.  She does not like to take the metformin and wellbutrin due to abnormal taste on her tongue.  She reports it gives her a chalky taste.  She is compliant with Jardiance.  She did not take her blood pressures this morning.  She has had reflux for several years.  She felt that it was due to textured foods, and she would gag.  Every time she eats, she gets \"sick\" such as gagging and feels food coming back up. Her stomach feels very hard and \"there is a fluttery feeling.\"  She reports pills and food gets stuck in her throat.  She has increase in gas.  Menses are regular, but continued to be heavy.  She is due for Pap smear in December and will discuss possible IUD.    She is compliant with her inhalers, reports compliance with Symbicort and Ventolin as needed.        The following portions of the patient's history were reviewed and updated as appropriate: active problem list, medication list, allergies, family history, social history    Review of " "Systems:   Review of Systems   Constitutional: Negative for activity change, appetite change, diaphoresis, fatigue, unexpected weight gain and unexpected weight loss.   HENT: Positive for trouble swallowing. Negative for hearing loss.    Eyes: Negative for blurred vision, double vision and visual disturbance.   Respiratory: Negative for chest tightness and shortness of breath.    Cardiovascular: Negative for chest pain, palpitations and leg swelling.   Gastrointestinal: Positive for abdominal distention, abdominal pain, nausea, GERD and indigestion. Negative for blood in stool.   Endocrine: Negative for cold intolerance and heat intolerance.   Genitourinary: Negative for dysuria and hematuria.   Musculoskeletal: Negative for arthralgias and myalgias.   Skin: Negative for skin lesions.   Allergic/Immunologic: Negative for environmental allergies and food allergies.   Neurological: Negative for tremors, seizures, syncope, speech difficulty, weakness, headache, memory problem and confusion.   Hematological: Does not bruise/bleed easily.   Psychiatric/Behavioral: Negative for sleep disturbance and depressed mood. The patient is not nervous/anxious.        Vital Signs  Vitals:    05/24/21 1255   BP: (!) 146/102   BP Location: Left arm   Patient Position: Sitting   Cuff Size: Large Adult   Pulse: 116   Temp: 98.4 °F (36.9 °C)   TempSrc: Temporal   Weight: 119 kg (263 lb)   Height: 162.5 cm (63.98\")   PainSc: 0-No pain     Body mass index is 45.18 kg/m².    Labs  No visits with results within 3 Month(s) from this visit.   Latest known visit with results is:   Lab on 01/20/2021   Component Date Value Ref Range Status   • Hemoglobin A1C 01/20/2021 7.89* 4.80 - 5.60 % Final   • Glucose 01/20/2021 181* 65 - 99 mg/dL Final   • BUN 01/20/2021 7  6 - 20 mg/dL Final   • Creatinine 01/20/2021 0.72  0.57 - 1.00 mg/dL Final   • Sodium 01/20/2021 138  136 - 145 mmol/L Final   • Potassium 01/20/2021 4.0  3.5 - 5.2 mmol/L Final   • " Chloride 01/20/2021 99  98 - 107 mmol/L Final   • CO2 01/20/2021 25.0  22.0 - 29.0 mmol/L Final   • Calcium 01/20/2021 9.2  8.6 - 10.5 mg/dL Final   • Total Protein 01/20/2021 7.5  6.0 - 8.5 g/dL Final   • Albumin 01/20/2021 4.30  3.50 - 5.20 g/dL Final   • ALT (SGPT) 01/20/2021 18  1 - 33 U/L Final   • AST (SGOT) 01/20/2021 18  1 - 32 U/L Final   • Alkaline Phosphatase 01/20/2021 89  39 - 117 U/L Final   • Total Bilirubin 01/20/2021 0.3  0.0 - 1.2 mg/dL Final   • eGFR   Amer 01/20/2021 110  >60 mL/min/1.73 Final   • Globulin 01/20/2021 3.2  gm/dL Final   • A/G Ratio 01/20/2021 1.3  g/dL Final   • BUN/Creatinine Ratio 01/20/2021 9.7  7.0 - 25.0 Final   • Anion Gap 01/20/2021 14.0  5.0 - 15.0 mmol/L Final   • 25 Hydroxy, Vitamin D 01/20/2021 22.9* 30.0 - 100.0 ng/ml Final   • Total Cholesterol 01/20/2021 173  0 - 200 mg/dL Final   • Triglycerides 01/20/2021 107  0 - 150 mg/dL Final   • HDL Cholesterol 01/20/2021 45  40 - 60 mg/dL Final   • LDL Cholesterol  01/20/2021 109* 0 - 100 mg/dL Final   • VLDL Cholesterol 01/20/2021 19  5 - 40 mg/dL Final   • LDL/HDL Ratio 01/20/2021 2.37   Final   • WBC 01/20/2021 7.42  3.40 - 10.80 10*3/mm3 Final   • RBC 01/20/2021 4.37  3.77 - 5.28 10*6/mm3 Final   • Hemoglobin 01/20/2021 10.8* 12.0 - 15.9 g/dL Final   • Hematocrit 01/20/2021 34.2  34.0 - 46.6 % Final   • MCV 01/20/2021 78.3* 79.0 - 97.0 fL Final   • MCH 01/20/2021 24.7* 26.6 - 33.0 pg Final   • MCHC 01/20/2021 31.6  31.5 - 35.7 g/dL Final   • RDW 01/20/2021 15.5* 12.3 - 15.4 % Final   • RDW-SD 01/20/2021 43.3  37.0 - 54.0 fl Final   • MPV 01/20/2021 10.5  6.0 - 12.0 fL Final   • Platelets 01/20/2021 518* 140 - 450 10*3/mm3 Final   • Neutrophil % 01/20/2021 58.1  42.7 - 76.0 % Final   • Lymphocyte % 01/20/2021 33.0  19.6 - 45.3 % Final   • Monocyte % 01/20/2021 6.9  5.0 - 12.0 % Final   • Eosinophil % 01/20/2021 1.1  0.3 - 6.2 % Final   • Basophil % 01/20/2021 0.5  0.0 - 1.5 % Final   • Immature Grans % 01/20/2021  0.4  0.0 - 0.5 % Final   • Neutrophils, Absolute 01/20/2021 4.31  1.70 - 7.00 10*3/mm3 Final   • Lymphocytes, Absolute 01/20/2021 2.45  0.70 - 3.10 10*3/mm3 Final   • Monocytes, Absolute 01/20/2021 0.51  0.10 - 0.90 10*3/mm3 Final   • Eosinophils, Absolute 01/20/2021 0.08  0.00 - 0.40 10*3/mm3 Final   • Basophils, Absolute 01/20/2021 0.04  0.00 - 0.20 10*3/mm3 Final   • Immature Grans, Absolute 01/20/2021 0.03  0.00 - 0.05 10*3/mm3 Final   • nRBC 01/20/2021 0.0  0.0 - 0.2 /100 WBC Final       Imaging  No radiology results for the last 30 days.      Current Outpatient Medications:   •  amLODIPine (NORVASC) 10 MG tablet, Take 1 tablet by mouth Daily., Disp: 90 tablet, Rfl: 3  •  budesonide-formoterol (SYMBICORT) 160-4.5 MCG/ACT inhaler, Inhale 2 puffs 2 (Two) Times a Day., Disp: , Rfl:   •  cetirizine (zyrTEC) 10 MG tablet, Take 1 tablet by mouth Daily., Disp: , Rfl:   •  Empagliflozin 10 MG tablet, Take 10 mg by mouth Daily., Disp: 30 tablet, Rfl: 2  •  fluticasone (FLONASE) 50 MCG/ACT nasal spray, 2 sprays into the nostril(s) as directed by provider Daily As Needed., Disp: , Rfl:   •  ibuprofen (ADVIL,MOTRIN) 800 MG tablet, Take 1 tablet by mouth Every 8 (Eight) Hours As Needed for Mild Pain ., Disp: 90 tablet, Rfl: 5  •  lisinopril (PRINIVIL,ZESTRIL) 20 MG tablet, Take 1 tablet by mouth Daily., Disp: 90 tablet, Rfl: 1  •  metFORMIN ER (GLUCOPHAGE-XR) 750 MG 24 hr tablet, Take 1 tablet by mouth Daily With Breakfast., Disp: 90 tablet, Rfl: 1  •  Ventolin  (90 Base) MCG/ACT inhaler, Inhale 2 puffs Every 4 (Four) Hours As Needed., Disp: , Rfl:   •  vitamin D (ERGOCALCIFEROL) 1.25 MG (25195 UT) capsule capsule, Take 1 capsule by mouth 1 (One) Time Per Week., Disp: 4 capsule, Rfl: 3    Physical Exam:    Physical Exam  Vitals reviewed.   Constitutional:       Appearance: Normal appearance. She is well-developed. She is obese.   HENT:      Head: Normocephalic and atraumatic.      Right Ear: Hearing, tympanic  membrane, ear canal and external ear normal.      Left Ear: Hearing, tympanic membrane, ear canal and external ear normal.      Nose: Nose normal.      Mouth/Throat:      Mouth: Mucous membranes are moist.      Pharynx: Oropharynx is clear. Uvula midline.   Eyes:      General: Lids are normal.      Conjunctiva/sclera: Conjunctivae normal.      Pupils: Pupils are equal, round, and reactive to light.   Cardiovascular:      Rate and Rhythm: Normal rate and regular rhythm.      Pulses: Normal pulses.      Heart sounds: Normal heart sounds.   Pulmonary:      Effort: Pulmonary effort is normal.      Breath sounds: Normal breath sounds.   Abdominal:      General: Bowel sounds are normal. There is distension.      Palpations: Abdomen is soft.      Tenderness: There is abdominal tenderness.   Musculoskeletal:         General: Normal range of motion.      Cervical back: Full passive range of motion without pain, normal range of motion and neck supple.   Skin:     General: Skin is warm and dry.   Neurological:      Mental Status: She is alert and oriented to person, place, and time.      Deep Tendon Reflexes: Reflexes are normal and symmetric.   Psychiatric:         Speech: Speech normal.         Behavior: Behavior normal.         Thought Content: Thought content normal.         Judgment: Judgment normal.         Procedures        Assessment/Plan   Problem List Items Addressed This Visit        Cardiac and Vasculature    Essential hypertension    Overview     Continue amlodipine 10 mg tablets and lisinopril 10 mg tablets daily.         Current Assessment & Plan     Deanna forgot to take her medications this morning.  Emphasis on compliance.         Relevant Medications    amLODIPine (NORVASC) 10 MG tablet    lisinopril (PRINIVIL,ZESTRIL) 20 MG tablet    Other Relevant Orders    CBC & Differential    Comprehensive Metabolic Panel    Screening, lipid    Relevant Orders    Lipid Panel       Endocrine and Metabolic    Morbid  obesity with body mass index (BMI) of 40.0 to 49.9 (CMS/[facility])    Overview     Body mass index is 45.18 kg/m². Discussed importance of weight loss.  Recommend low fat, low calorie diet.  Recommend daily cardiovascular exercise. Goal is to lose 5 lbs in one month.            Type 2 diabetes mellitus without complication, without long-term current use of insulin (CMS/[facility])    Overview     Restart Metformin 750 mg XR daily.  Continue to take Jardiance 10 mg tablets daily.  Try and start a regular walking program.  Try and cut back on processed foods and sugary foods.         Relevant Medications    metFORMIN ER (GLUCOPHAGE-XR) 750 MG 24 hr tablet    Empagliflozin 10 MG tablet    Other Relevant Orders    Comprehensive Metabolic Panel    Hemoglobin A1c    Vitamin D deficiency    Relevant Medications    vitamin D (ERGOCALCIFEROL) 1.25 MG (91205 UT) capsule capsule    Other Relevant Orders    Vitamin D 25 Hydroxy       Genitourinary and Reproductive     Menorrhagia with regular cycle    Overview     Likely due to fibroid uterus.  She has had consultation with gynecology.  I do feel her heavy periods may be the source of her decreasing hemoglobin.  We will continue to monitor.  Gynecologist did recommend hysterectomy, but patient wants to see if she can biologically have children.  Discussed possible Mirena, will depend on location and size of fibroids.            Mental Health    Recurrent major depressive disorder, in full remission (CMS/[facility])    Overview     She has discontinued Wellbutrin altogether.  She is currently seeing therapist.  We will continue to monitor for now.            Pulmonary and Pneumonias    Mild intermittent asthma without complication    Overview     Continue Symbicort as directed.  Continue Ventolin as directed.         Relevant Medications    budesonide-formoterol (SYMBICORT) 160-4.5 MCG/ACT inhaler    Ventolin  (90 Base) MCG/ACT inhaler      Other Visit Diagnoses     Dysphagia, unspecified  type    -  Primary    Relevant Orders    Ambulatory referral for Screening EGD    TSH    T4, Free    H. Pylori Breath Test - Breath, Lung    Encounter for vitamin deficiency screening        Relevant Orders    Vitamin B12      For now, emphasis placed on compliance of blood pressure medications and diabetes medications.  Fasting order placed.  We will also add urea breath test.  Referral placed for EGD.  Patient is encouraged to continue therapy and we will follow-up in 3 months.    Return in about 3 months (around 8/24/2021).    Plan of care reviewed with patient at the conclusion of today's visit. Education was provided regarding diagnosis, management, and any prescribed or recommended OTC medications.Patient verbalizes understanding of and agreement with management plan.       Roberta Rodriguez PA-C    Please note that portions of this note were completed with a voice recognition program. Efforts were made to edit the dictations, but occasionally words are mistranscribed.  Answers for HPI/ROS submitted by the patient on 5/24/2021  Please describe your symptoms.: Possible GERD symptoms  Have you had these symptoms before?: Yes  How long have you been having these symptoms?: Greater than 2 weeks  What is the primary reason for your visit?: Other

## 2021-05-24 NOTE — PATIENT INSTRUCTIONS
"BMI for Adults  What is BMI?  Body mass index (BMI) is a number that is calculated from a person's weight and height. BMI can help estimate how much of a person's weight is composed of fat. BMI does not measure body fat directly. Rather, it is an alternative to procedures that directly measure body fat, which can be difficult and expensive.  BMI can help identify people who may be at higher risk for certain medical problems.  What are BMI measurements used for?  BMI is used as a screening tool to identify possible weight problems. It helps determine whether a person is obese, overweight, a healthy weight, or underweight.  BMI is useful for:  · Identifying a weight problem that may be related to a medical condition or may increase the risk for medical problems.  · Promoting changes, such as changes in diet and exercise, to help reach a healthy weight. BMI screening can be repeated to see if these changes are working.  How is BMI calculated?  BMI involves measuring your weight in relation to your height. Both height and weight are measured, and the BMI is calculated from those numbers. This can be done either in English ([place] or metric measurements. Note that charts and online BMI calculators are available to help you find your BMI quickly and easily without having to do these calculations yourself.  To calculate your BMI in English ([place] measurements:    1. Measure your weight in pounds (lb).  2. Multiply the number of pounds by 703.  ? For example, for a person who weighs 180 lb, multiply that number by 703, which equals 126,540.  3. Measure your height in inches. Then multiply that number by itself to get a measurement called \"inches squared.\"  ? For example, for a person who is 70 inches tall, the \"inches squared\" measurement is 70 inches x 70 inches, which equals 4,900 inches squared.  4. Divide the total from step 2 (number of lb x 703) by the total from step 3 (inches squared): 126,540 ÷ 4,900 = 25.8. This is " "your BMI.  To calculate your BMI in metric measurements:  1. Measure your weight in kilograms (kg).  2. Measure your height in meters (m). Then multiply that number by itself to get a measurement called \"meters squared.\"  ? For example, for a person who is 1.75 m tall, the \"meters squared\" measurement is 1.75 m x 1.75 m, which is equal to 3.1 meters squared.  3. Divide the number of kilograms (your weight) by the meters squared number. In this example: 70 ÷ 3.1 = 22.6. This is your BMI.  What do the results mean?  BMI charts are used to identify whether you are underweight, normal weight, overweight, or obese. The following guidelines will be used:  · Underweight: BMI less than 18.5.  · Normal weight: BMI between 18.5 and 24.9.  · Overweight: BMI between 25 and 29.9.  · Obese: BMI of 30 or above.  Keep these notes in mind:  · Weight includes both fat and muscle, so someone with a muscular build, such as an athlete, may have a BMI that is higher than 24.9. In cases like these, BMI is not an accurate measure of body fat.  · To determine if excess body fat is the cause of a BMI of 25 or higher, further assessments may need to be done by a health care provider.  · BMI is usually interpreted in the same way for men and women.  Where to find more information  For more information about BMI, including tools to quickly calculate your BMI, go to these websites:  · Centers for Disease Control and Prevention: www.cdc.gov  · American Heart Association: www.heart.org  · National Heart, Lung, and Blood Todd: www.nhlbi.nih.gov  Summary  · Body mass index (BMI) is a number that is calculated from a person's weight and height.  · BMI may help estimate how much of a person's weight is composed of fat. BMI can help identify those who may be at higher risk for certain medical problems.  · BMI can be measured using English measurements or metric measurements.  · BMI charts are used to identify whether you are underweight, normal " weight, overweight, or obese.  This information is not intended to replace advice given to you by your health care provider. Make sure you discuss any questions you have with your health care provider.  Document Revised: 09/09/2020 Document Reviewed: 07/17/2020  Elsevier Patient Education © 2021 [employer]

## 2021-05-26 ENCOUNTER — LAB (OUTPATIENT)
Dept: LAB | Facility: HOSPITAL | Age: 36
End: 2021-05-26

## 2021-05-26 DIAGNOSIS — R13.10 DYSPHAGIA, UNSPECIFIED TYPE: ICD-10-CM

## 2021-05-26 DIAGNOSIS — Z13.220 SCREENING, LIPID: ICD-10-CM

## 2021-05-26 DIAGNOSIS — E55.9 VITAMIN D DEFICIENCY: ICD-10-CM

## 2021-05-26 DIAGNOSIS — I10 ESSENTIAL HYPERTENSION: ICD-10-CM

## 2021-05-26 DIAGNOSIS — Z13.21 ENCOUNTER FOR VITAMIN DEFICIENCY SCREENING: ICD-10-CM

## 2021-05-26 DIAGNOSIS — E11.9 TYPE 2 DIABETES MELLITUS WITHOUT COMPLICATION, WITHOUT LONG-TERM CURRENT USE OF INSULIN (HCC): ICD-10-CM

## 2021-05-26 LAB
25(OH)D3 SERPL-MCNC: 18.6 NG/ML (ref 30–100)
ALBUMIN SERPL-MCNC: 3.9 G/DL (ref 3.5–5.2)
ALBUMIN/GLOB SERPL: 1.2 G/DL
ALP SERPL-CCNC: 95 U/L (ref 39–117)
ALT SERPL W P-5'-P-CCNC: 18 U/L (ref 1–33)
ANION GAP SERPL CALCULATED.3IONS-SCNC: 12.6 MMOL/L (ref 5–15)
AST SERPL-CCNC: 19 U/L (ref 1–32)
BASOPHILS # BLD AUTO: 0.05 10*3/MM3 (ref 0–0.2)
BASOPHILS NFR BLD AUTO: 0.6 % (ref 0–1.5)
BILIRUB SERPL-MCNC: 0.5 MG/DL (ref 0–1.2)
BUN SERPL-MCNC: 9 MG/DL (ref 6–20)
BUN/CREAT SERPL: 15.3 (ref 7–25)
CALCIUM SPEC-SCNC: 9.3 MG/DL (ref 8.6–10.5)
CHLORIDE SERPL-SCNC: 100 MMOL/L (ref 98–107)
CHOLEST SERPL-MCNC: 177 MG/DL (ref 0–200)
CO2 SERPL-SCNC: 24.4 MMOL/L (ref 22–29)
CREAT SERPL-MCNC: 0.59 MG/DL (ref 0.57–1)
DEPRECATED RDW RBC AUTO: 44.5 FL (ref 37–54)
EOSINOPHIL # BLD AUTO: 0.17 10*3/MM3 (ref 0–0.4)
EOSINOPHIL NFR BLD AUTO: 2.1 % (ref 0.3–6.2)
ERYTHROCYTE [DISTWIDTH] IN BLOOD BY AUTOMATED COUNT: 15.8 % (ref 12.3–15.4)
GFR SERPL CREATININE-BSD FRML MDRD: 139 ML/MIN/1.73
GLOBULIN UR ELPH-MCNC: 3.3 GM/DL
GLUCOSE SERPL-MCNC: 251 MG/DL (ref 65–99)
HBA1C MFR BLD: 10.08 % (ref 4.8–5.6)
HCT VFR BLD AUTO: 35 % (ref 34–46.6)
HDLC SERPL-MCNC: 42 MG/DL (ref 40–60)
HGB BLD-MCNC: 10.9 G/DL (ref 12–15.9)
LDLC SERPL CALC-MCNC: 109 MG/DL (ref 0–100)
LDLC/HDLC SERPL: 2.5 {RATIO}
LYMPHOCYTES # BLD AUTO: 2.27 10*3/MM3 (ref 0.7–3.1)
LYMPHOCYTES NFR BLD AUTO: 27.5 % (ref 19.6–45.3)
MCH RBC QN AUTO: 24.4 PG (ref 26.6–33)
MCHC RBC AUTO-ENTMCNC: 31.1 G/DL (ref 31.5–35.7)
MCV RBC AUTO: 78.5 FL (ref 79–97)
MONOCYTES # BLD AUTO: 0.5 10*3/MM3 (ref 0.1–0.9)
MONOCYTES NFR BLD AUTO: 6.1 % (ref 5–12)
NEUTROPHILS NFR BLD AUTO: 5.17 10*3/MM3 (ref 1.7–7)
NEUTROPHILS NFR BLD AUTO: 62.5 % (ref 42.7–76)
PLATELET # BLD AUTO: 425 10*3/MM3 (ref 140–450)
PMV BLD AUTO: 11.2 FL (ref 6–12)
POTASSIUM SERPL-SCNC: 3.9 MMOL/L (ref 3.5–5.2)
PROT SERPL-MCNC: 7.2 G/DL (ref 6–8.5)
RBC # BLD AUTO: 4.46 10*6/MM3 (ref 3.77–5.28)
SODIUM SERPL-SCNC: 137 MMOL/L (ref 136–145)
T4 FREE SERPL-MCNC: 1.32 NG/DL (ref 0.93–1.7)
TRIGL SERPL-MCNC: 149 MG/DL (ref 0–150)
TSH SERPL DL<=0.05 MIU/L-ACNC: 1.57 UIU/ML (ref 0.27–4.2)
VIT B12 BLD-MCNC: 364 PG/ML (ref 211–946)
VLDLC SERPL-MCNC: 26 MG/DL (ref 5–40)
WBC # BLD AUTO: 8.26 10*3/MM3 (ref 3.4–10.8)

## 2021-05-26 PROCEDURE — 80053 COMPREHEN METABOLIC PANEL: CPT

## 2021-05-26 PROCEDURE — 80061 LIPID PANEL: CPT

## 2021-05-26 PROCEDURE — 83013 H PYLORI (C-13) BREATH: CPT

## 2021-05-26 PROCEDURE — 82306 VITAMIN D 25 HYDROXY: CPT

## 2021-05-26 PROCEDURE — 82607 VITAMIN B-12: CPT

## 2021-05-26 PROCEDURE — 85025 COMPLETE CBC W/AUTO DIFF WBC: CPT

## 2021-05-26 PROCEDURE — 84443 ASSAY THYROID STIM HORMONE: CPT

## 2021-05-26 PROCEDURE — 84439 ASSAY OF FREE THYROXINE: CPT

## 2021-05-26 PROCEDURE — 83036 HEMOGLOBIN GLYCOSYLATED A1C: CPT

## 2021-05-26 RX ORDER — EMPAGLIFLOZIN 10 MG/1
TABLET, FILM COATED ORAL
Qty: 30 TABLET | Refills: 5 | Status: SHIPPED | OUTPATIENT
Start: 2021-05-26 | End: 2022-04-25 | Stop reason: SDUPTHER

## 2021-05-27 LAB — UREA BREATH TEST QL: NEGATIVE

## 2021-05-31 DIAGNOSIS — E55.9 VITAMIN D DEFICIENCY: ICD-10-CM

## 2021-05-31 DIAGNOSIS — E11.9 TYPE 2 DIABETES MELLITUS WITHOUT COMPLICATION, WITHOUT LONG-TERM CURRENT USE OF INSULIN (HCC): Primary | ICD-10-CM

## 2021-05-31 RX ORDER — ERGOCALCIFEROL 1.25 MG/1
50000 CAPSULE ORAL WEEKLY
Qty: 4 CAPSULE | Refills: 3 | Status: SHIPPED | OUTPATIENT
Start: 2021-05-31 | End: 2021-09-17

## 2021-06-01 ENCOUNTER — APPOINTMENT (OUTPATIENT)
Dept: PREADMISSION TESTING | Facility: HOSPITAL | Age: 36
End: 2021-06-01

## 2021-06-01 LAB — SARS-COV-2 RNA PNL SPEC NAA+PROBE: NOT DETECTED

## 2021-06-01 PROCEDURE — U0004 COV-19 TEST NON-CDC HGH THRU: HCPCS

## 2021-06-01 PROCEDURE — C9803 HOPD COVID-19 SPEC COLLECT: HCPCS

## 2021-06-03 ENCOUNTER — OUTSIDE FACILITY SERVICE (OUTPATIENT)
Dept: GASTROENTEROLOGY | Facility: CLINIC | Age: 36
End: 2021-06-03

## 2021-06-03 ENCOUNTER — TELEPHONE (OUTPATIENT)
Dept: INTERNAL MEDICINE | Facility: CLINIC | Age: 36
End: 2021-06-03

## 2021-06-03 PROCEDURE — 88305 TISSUE EXAM BY PATHOLOGIST: CPT | Performed by: INTERNAL MEDICINE

## 2021-06-03 PROCEDURE — 43239 EGD BIOPSY SINGLE/MULTIPLE: CPT | Performed by: INTERNAL MEDICINE

## 2021-06-03 NOTE — TELEPHONE ENCOUNTER
[facility] pharmacy requesting PA for Turlicity/ Dulglutide .75 MG 5 ML     Good RX steele [phone] would like PA done?

## 2021-06-04 ENCOUNTER — LAB REQUISITION (OUTPATIENT)
Dept: LAB | Facility: HOSPITAL | Age: 36
End: 2021-06-04

## 2021-06-04 DIAGNOSIS — R13.10 DYSPHAGIA, UNSPECIFIED: ICD-10-CM

## 2021-06-04 DIAGNOSIS — K20.90 ESOPHAGITIS, UNSPECIFIED WITHOUT BLEEDING: ICD-10-CM

## 2021-06-04 DIAGNOSIS — K21.9 GASTRO-ESOPHAGEAL REFLUX DISEASE WITHOUT ESOPHAGITIS: ICD-10-CM

## 2021-06-04 DIAGNOSIS — K44.9 DIAPHRAGMATIC HERNIA WITHOUT OBSTRUCTION OR GANGRENE: ICD-10-CM

## 2021-06-04 DIAGNOSIS — R10.13 EPIGASTRIC PAIN: ICD-10-CM

## 2021-06-04 DIAGNOSIS — R11.0 NAUSEA: ICD-10-CM

## 2021-06-07 LAB
CYTO UR: NORMAL
LAB AP CASE REPORT: NORMAL
LAB AP CLINICAL INFORMATION: NORMAL
PATH REPORT.FINAL DX SPEC: NORMAL
PATH REPORT.GROSS SPEC: NORMAL

## 2021-06-09 ENCOUNTER — TELEPHONE (OUTPATIENT)
Dept: GASTROENTEROLOGY | Facility: CLINIC | Age: 36
End: 2021-06-09

## 2021-06-09 NOTE — TELEPHONE ENCOUNTER
Dr Biswas,  I spoke with Ms Putnam this morning. Biopsy results given. I will send GERD diet to patient's my chart. Ms Putnam stated she doesn't have a gallbladder anymore. Thanks

## 2021-06-09 NOTE — TELEPHONE ENCOUNTER
----- Message from Nolan Biswas MD sent at 6/8/2021  5:04 PM EDT -----  Let Ms. Putnam know there was no evidence for H. pylori.  There was some mild changes of reflux on esophageal biopsies.  I would recommend GERD lifestyle modifications.  However, consider further evaluation of the gallbladder if the patient is agreeable.

## 2021-06-27 DIAGNOSIS — F33.42 RECURRENT MAJOR DEPRESSIVE DISORDER, IN FULL REMISSION (HCC): ICD-10-CM

## 2021-06-28 RX ORDER — BUPROPION HYDROCHLORIDE 300 MG/1
TABLET ORAL
Qty: 30 TABLET | Refills: 4 | OUTPATIENT
Start: 2021-06-28

## 2021-06-28 RX ORDER — AMLODIPINE BESYLATE 10 MG/1
TABLET ORAL
Qty: 30 TABLET | Refills: 2 | Status: SHIPPED | OUTPATIENT
Start: 2021-06-28 | End: 2021-09-27

## 2021-06-29 DIAGNOSIS — G47.30 SLEEP APNEA, UNSPECIFIED TYPE: Primary | ICD-10-CM

## 2021-09-17 DIAGNOSIS — E55.9 VITAMIN D DEFICIENCY: ICD-10-CM

## 2021-09-17 RX ORDER — ERGOCALCIFEROL 1.25 MG/1
CAPSULE ORAL
Qty: 4 CAPSULE | Refills: 3 | Status: SHIPPED | OUTPATIENT
Start: 2021-09-17 | End: 2022-04-25

## 2021-09-27 RX ORDER — AMLODIPINE BESYLATE 10 MG/1
TABLET ORAL
Qty: 90 TABLET | Refills: 1 | Status: SHIPPED | OUTPATIENT
Start: 2021-09-27 | End: 2022-04-25 | Stop reason: SDUPTHER

## 2021-09-27 NOTE — TELEPHONE ENCOUNTER
Rx Refill Note  Requested Prescriptions     Pending Prescriptions Disp Refills   • amLODIPine (NORVASC) 10 MG tablet [Pharmacy Med Name: amLODIPine BESYLATE 10 MG TAB] 90 tablet 1     Sig: TAKE ONE TABLET BY MOUTH DAILY      Last office visit with prescribing clinician: 5/24/2021      Next office visit with prescribing clinician: none           Dejah Bishop RN  09/27/21, 09:32 EDT

## 2022-04-20 ENCOUNTER — APPOINTMENT (OUTPATIENT)
Dept: GENERAL RADIOLOGY | Facility: HOSPITAL | Age: 37
End: 2022-04-20

## 2022-04-20 ENCOUNTER — HOSPITAL ENCOUNTER (INPATIENT)
Facility: HOSPITAL | Age: 37
LOS: 2 days | Discharge: HOME | End: 2022-04-22
Attending: EMERGENCY MEDICINE | Admitting: INTERNAL MEDICINE

## 2022-04-20 DIAGNOSIS — R06.02 SHORTNESS OF BREATH: ICD-10-CM

## 2022-04-20 DIAGNOSIS — J81.0 ACUTE PULMONARY EDEMA: ICD-10-CM

## 2022-04-20 DIAGNOSIS — I16.1 HYPERTENSIVE EMERGENCY: Primary | ICD-10-CM

## 2022-04-20 PROBLEM — I10 ESSENTIAL HYPERTENSION: Chronic | Status: ACTIVE | Noted: 2019-02-11

## 2022-04-20 PROBLEM — E66.01 MORBID OBESITY WITH BODY MASS INDEX (BMI) OF 40.0 TO 49.9: Chronic | Status: ACTIVE | Noted: 2019-02-11

## 2022-04-20 PROBLEM — E11.9 TYPE 2 DIABETES MELLITUS WITHOUT COMPLICATION, WITHOUT LONG-TERM CURRENT USE OF INSULIN (HCC): Chronic | Status: ACTIVE | Noted: 2019-08-15

## 2022-04-20 PROBLEM — E87.1 HYPONATREMIA: Status: ACTIVE | Noted: 2022-04-20

## 2022-04-20 PROBLEM — Z91.199 MEDICALLY NONCOMPLIANT: Status: ACTIVE | Noted: 2022-04-20

## 2022-04-20 LAB
ALBUMIN SERPL-MCNC: 4.2 G/DL (ref 3.5–5.2)
ALBUMIN/GLOB SERPL: 1.3 G/DL
ALP SERPL-CCNC: 94 U/L (ref 39–117)
ALT SERPL W P-5'-P-CCNC: 17 U/L (ref 1–33)
AMPHET+METHAMPHET UR QL: NEGATIVE
AMPHETAMINES UR QL: NEGATIVE
ANION GAP SERPL CALCULATED.3IONS-SCNC: 8 MMOL/L (ref 5–15)
AST SERPL-CCNC: 15 U/L (ref 1–32)
B-HCG UR QL: NEGATIVE
BARBITURATES UR QL SCN: NEGATIVE
BASOPHILS # BLD AUTO: 0.07 10*3/MM3 (ref 0–0.2)
BASOPHILS NFR BLD AUTO: 0.6 % (ref 0–1.5)
BENZODIAZ UR QL SCN: NEGATIVE
BILIRUB SERPL-MCNC: 0.5 MG/DL (ref 0–1.2)
BILIRUB UR QL STRIP: NEGATIVE
BUN SERPL-MCNC: 13 MG/DL (ref 6–20)
BUN/CREAT SERPL: 15.7 (ref 7–25)
BUPRENORPHINE SERPL-MCNC: NEGATIVE NG/ML
CALCIUM SPEC-SCNC: 9.4 MG/DL (ref 8.6–10.5)
CANNABINOIDS SERPL QL: NEGATIVE
CHLORIDE SERPL-SCNC: 91 MMOL/L (ref 98–107)
CLARITY UR: CLEAR
CO2 SERPL-SCNC: 26 MMOL/L (ref 22–29)
COCAINE UR QL: NEGATIVE
COLOR UR: YELLOW
CORTIS SERPL-MCNC: 13.59 MCG/DL
CREAT SERPL-MCNC: 0.83 MG/DL (ref 0.57–1)
D-LACTATE SERPL-SCNC: 1.3 MMOL/L (ref 0.5–2)
DEPRECATED RDW RBC AUTO: 47 FL (ref 37–54)
EGFRCR SERPLBLD CKD-EPI 2021: 92.7 ML/MIN/1.73
EOSINOPHIL # BLD AUTO: 0.07 10*3/MM3 (ref 0–0.4)
EOSINOPHIL NFR BLD AUTO: 0.6 % (ref 0.3–6.2)
ERYTHROCYTE [DISTWIDTH] IN BLOOD BY AUTOMATED COUNT: 15.8 % (ref 12.3–15.4)
ETHANOL BLD-MCNC: <10 MG/DL (ref 0–10)
EXPIRATION DATE: NORMAL
FLUAV RNA RESP QL NAA+PROBE: NOT DETECTED
FLUBV RNA RESP QL NAA+PROBE: NOT DETECTED
GLOBULIN UR ELPH-MCNC: 3.3 GM/DL
GLUCOSE BLDC GLUCOMTR-MCNC: 206 MG/DL (ref 70–130)
GLUCOSE SERPL-MCNC: 214 MG/DL (ref 65–99)
GLUCOSE UR STRIP-MCNC: NEGATIVE MG/DL
HBA1C MFR BLD: 6.7 % (ref 4.8–5.6)
HCT VFR BLD AUTO: 35.6 % (ref 34–46.6)
HGB BLD-MCNC: 10.7 G/DL (ref 12–15.9)
HGB UR QL STRIP.AUTO: NEGATIVE
HOLD SPECIMEN: NORMAL
IMM GRANULOCYTES # BLD AUTO: 0.05 10*3/MM3 (ref 0–0.05)
IMM GRANULOCYTES NFR BLD AUTO: 0.4 % (ref 0–0.5)
INTERNAL NEGATIVE CONTROL: NEGATIVE
INTERNAL POSITIVE CONTROL: POSITIVE
KETONES UR QL STRIP: NEGATIVE
LEUKOCYTE ESTERASE UR QL STRIP.AUTO: NEGATIVE
LYMPHOCYTES # BLD AUTO: 3.81 10*3/MM3 (ref 0.7–3.1)
LYMPHOCYTES NFR BLD AUTO: 32 % (ref 19.6–45.3)
Lab: NORMAL
MCH RBC QN AUTO: 24.7 PG (ref 26.6–33)
MCHC RBC AUTO-ENTMCNC: 30.1 G/DL (ref 31.5–35.7)
MCV RBC AUTO: 82 FL (ref 79–97)
METHADONE UR QL SCN: NEGATIVE
MONOCYTES # BLD AUTO: 0.61 10*3/MM3 (ref 0.1–0.9)
MONOCYTES NFR BLD AUTO: 5.1 % (ref 5–12)
NEUTROPHILS NFR BLD AUTO: 61.3 % (ref 42.7–76)
NEUTROPHILS NFR BLD AUTO: 7.3 10*3/MM3 (ref 1.7–7)
NITRITE UR QL STRIP: NEGATIVE
NRBC BLD AUTO-RTO: 0 /100 WBC (ref 0–0.2)
NT-PROBNP SERPL-MCNC: 580.7 PG/ML (ref 0–450)
OPIATES UR QL: NEGATIVE
OSMOLALITY SERPL: 293 MOSM/KG (ref 275–295)
OSMOLALITY UR: 330 MOSM/KG (ref 300–1100)
OXYCODONE UR QL SCN: NEGATIVE
PCP UR QL SCN: NEGATIVE
PH UR STRIP.AUTO: 7.5 [PH] (ref 5–8)
PLATELET # BLD AUTO: 456 10*3/MM3 (ref 140–450)
PMV BLD AUTO: 10.9 FL (ref 6–12)
POTASSIUM SERPL-SCNC: 3.6 MMOL/L (ref 3.5–5.2)
PROCALCITONIN SERPL-MCNC: 0.02 NG/ML (ref 0–0.25)
PROPOXYPH UR QL: NEGATIVE
PROT SERPL-MCNC: 7.5 G/DL (ref 6–8.5)
PROT UR QL STRIP: NEGATIVE
RBC # BLD AUTO: 4.34 10*6/MM3 (ref 3.77–5.28)
SARS-COV-2 RNA RESP QL NAA+PROBE: NOT DETECTED
SODIUM SERPL-SCNC: 125 MMOL/L (ref 136–145)
SODIUM UR-SCNC: 131 MMOL/L
SP GR UR STRIP: 1.01 (ref 1–1.03)
T4 FREE SERPL-MCNC: 1.45 NG/DL (ref 0.93–1.7)
TRICYCLICS UR QL SCN: NEGATIVE
TROPONIN T SERPL-MCNC: <0.01 NG/ML (ref 0–0.03)
TSH SERPL DL<=0.05 MIU/L-ACNC: 1.61 UIU/ML (ref 0.27–4.2)
UROBILINOGEN UR QL STRIP: NORMAL
WBC NRBC COR # BLD: 11.91 10*3/MM3 (ref 3.4–10.8)
WHOLE BLOOD HOLD SPECIMEN: NORMAL
WHOLE BLOOD HOLD SPECIMEN: NORMAL

## 2022-04-20 PROCEDURE — 83036 HEMOGLOBIN GLYCOSYLATED A1C: CPT | Performed by: INTERNAL MEDICINE

## 2022-04-20 PROCEDURE — 83930 ASSAY OF BLOOD OSMOLALITY: CPT | Performed by: INTERNAL MEDICINE

## 2022-04-20 PROCEDURE — 83835 ASSAY OF METANEPHRINES: CPT | Performed by: INTERNAL MEDICINE

## 2022-04-20 PROCEDURE — 82570 ASSAY OF URINE CREATININE: CPT | Performed by: INTERNAL MEDICINE

## 2022-04-20 PROCEDURE — 25010000002 LORAZEPAM PER 2 MG: Performed by: EMERGENCY MEDICINE

## 2022-04-20 PROCEDURE — 93005 ELECTROCARDIOGRAM TRACING: CPT | Performed by: EMERGENCY MEDICINE

## 2022-04-20 PROCEDURE — 84484 ASSAY OF TROPONIN QUANT: CPT | Performed by: EMERGENCY MEDICINE

## 2022-04-20 PROCEDURE — 84300 ASSAY OF URINE SODIUM: CPT | Performed by: INTERNAL MEDICINE

## 2022-04-20 PROCEDURE — 94660 CPAP INITIATION&MGMT: CPT

## 2022-04-20 PROCEDURE — 25010000002 FUROSEMIDE PER 20 MG: Performed by: EMERGENCY MEDICINE

## 2022-04-20 PROCEDURE — 84145 PROCALCITONIN (PCT): CPT | Performed by: NURSE PRACTITIONER

## 2022-04-20 PROCEDURE — 71045 X-RAY EXAM CHEST 1 VIEW: CPT

## 2022-04-20 PROCEDURE — 83605 ASSAY OF LACTIC ACID: CPT | Performed by: NURSE PRACTITIONER

## 2022-04-20 PROCEDURE — 84439 ASSAY OF FREE THYROXINE: CPT | Performed by: INTERNAL MEDICINE

## 2022-04-20 PROCEDURE — 93005 ELECTROCARDIOGRAM TRACING: CPT

## 2022-04-20 PROCEDURE — 87636 SARSCOV2 & INF A&B AMP PRB: CPT | Performed by: NURSE PRACTITIONER

## 2022-04-20 PROCEDURE — 82533 TOTAL CORTISOL: CPT | Performed by: INTERNAL MEDICINE

## 2022-04-20 PROCEDURE — 82077 ASSAY SPEC XCP UR&BREATH IA: CPT | Performed by: NURSE PRACTITIONER

## 2022-04-20 PROCEDURE — 63710000001 INSULIN DETEMIR PER 5 UNITS: Performed by: INTERNAL MEDICINE

## 2022-04-20 PROCEDURE — 82962 GLUCOSE BLOOD TEST: CPT

## 2022-04-20 PROCEDURE — 81025 URINE PREGNANCY TEST: CPT | Performed by: EMERGENCY MEDICINE

## 2022-04-20 PROCEDURE — 94799 UNLISTED PULMONARY SVC/PX: CPT

## 2022-04-20 PROCEDURE — 99284 EMERGENCY DEPT VISIT MOD MDM: CPT

## 2022-04-20 PROCEDURE — 83880 ASSAY OF NATRIURETIC PEPTIDE: CPT | Performed by: EMERGENCY MEDICINE

## 2022-04-20 PROCEDURE — 63710000001 INSULIN LISPRO (HUMAN) PER 5 UNITS: Performed by: NURSE PRACTITIONER

## 2022-04-20 PROCEDURE — 80050 GENERAL HEALTH PANEL: CPT

## 2022-04-20 PROCEDURE — 81003 URINALYSIS AUTO W/O SCOPE: CPT | Performed by: INTERNAL MEDICINE

## 2022-04-20 PROCEDURE — 80306 DRUG TEST PRSMV INSTRMNT: CPT | Performed by: INTERNAL MEDICINE

## 2022-04-20 PROCEDURE — 83935 ASSAY OF URINE OSMOLALITY: CPT | Performed by: INTERNAL MEDICINE

## 2022-04-20 PROCEDURE — 99223 1ST HOSP IP/OBS HIGH 75: CPT | Performed by: INTERNAL MEDICINE

## 2022-04-20 RX ORDER — POTASSIUM CHLORIDE 750 MG/1
40 CAPSULE, EXTENDED RELEASE ORAL AS NEEDED
Status: DISCONTINUED | OUTPATIENT
Start: 2022-04-20 | End: 2022-04-22 | Stop reason: HOSPADM

## 2022-04-20 RX ORDER — FUROSEMIDE 10 MG/ML
40 INJECTION INTRAMUSCULAR; INTRAVENOUS ONCE
Status: COMPLETED | OUTPATIENT
Start: 2022-04-20 | End: 2022-04-20

## 2022-04-20 RX ORDER — POTASSIUM CHLORIDE 1.5 G/1.77G
40 POWDER, FOR SOLUTION ORAL AS NEEDED
Status: DISCONTINUED | OUTPATIENT
Start: 2022-04-20 | End: 2022-04-22 | Stop reason: HOSPADM

## 2022-04-20 RX ORDER — SODIUM CHLORIDE 0.9 % (FLUSH) 0.9 %
10 SYRINGE (ML) INJECTION AS NEEDED
Status: DISCONTINUED | OUTPATIENT
Start: 2022-04-20 | End: 2022-04-22 | Stop reason: HOSPADM

## 2022-04-20 RX ORDER — ACETAMINOPHEN 325 MG/1
650 TABLET ORAL EVERY 6 HOURS PRN
Status: DISCONTINUED | OUTPATIENT
Start: 2022-04-20 | End: 2022-04-22 | Stop reason: HOSPADM

## 2022-04-20 RX ORDER — NICOTINE POLACRILEX 4 MG
15 LOZENGE BUCCAL
Status: DISCONTINUED | OUTPATIENT
Start: 2022-04-20 | End: 2022-04-22 | Stop reason: HOSPADM

## 2022-04-20 RX ORDER — LORAZEPAM 2 MG/ML
0.5 INJECTION INTRAMUSCULAR ONCE
Status: COMPLETED | OUTPATIENT
Start: 2022-04-20 | End: 2022-04-20

## 2022-04-20 RX ORDER — DEXTROSE MONOHYDRATE 25 G/50ML
25 INJECTION, SOLUTION INTRAVENOUS
Status: DISCONTINUED | OUTPATIENT
Start: 2022-04-20 | End: 2022-04-22 | Stop reason: HOSPADM

## 2022-04-20 RX ORDER — NITROGLYCERIN 20 MG/100ML
5-200 INJECTION INTRAVENOUS
Status: DISCONTINUED | OUTPATIENT
Start: 2022-04-20 | End: 2022-04-22

## 2022-04-20 RX ADMIN — INSULIN DETEMIR 10 UNITS: 100 INJECTION, SOLUTION SUBCUTANEOUS at 20:19

## 2022-04-20 RX ADMIN — LORAZEPAM 0.5 MG: 2 INJECTION INTRAMUSCULAR; INTRAVENOUS at 16:13

## 2022-04-20 RX ADMIN — NICARDIPINE HYDROCHLORIDE 12.5 MG/HR: 25 INJECTION, SOLUTION INTRAVENOUS at 23:40

## 2022-04-20 RX ADMIN — INSULIN LISPRO 3 UNITS: 100 INJECTION, SOLUTION INTRAVENOUS; SUBCUTANEOUS at 21:04

## 2022-04-20 RX ADMIN — NICARDIPINE HYDROCHLORIDE 5 MG/HR: 25 INJECTION, SOLUTION INTRAVENOUS at 18:12

## 2022-04-20 RX ADMIN — NITROGLYCERIN 30 MCG/MIN: 20 INJECTION INTRAVENOUS at 14:03

## 2022-04-20 RX ADMIN — NICARDIPINE HYDROCHLORIDE 15 MG/HR: 25 INJECTION, SOLUTION INTRAVENOUS at 20:14

## 2022-04-20 RX ADMIN — FUROSEMIDE 40 MG: 10 INJECTION, SOLUTION INTRAMUSCULAR; INTRAVENOUS at 16:13

## 2022-04-20 RX ADMIN — ACETAMINOPHEN 650 MG: 325 TABLET ORAL at 20:14

## 2022-04-20 RX ADMIN — POTASSIUM CHLORIDE 40 MEQ: 750 CAPSULE, EXTENDED RELEASE ORAL at 21:04

## 2022-04-20 RX ADMIN — NICARDIPINE HYDROCHLORIDE 15 MG/HR: 25 INJECTION, SOLUTION INTRAVENOUS at 22:22

## 2022-04-21 ENCOUNTER — APPOINTMENT (OUTPATIENT)
Dept: CARDIOLOGY | Facility: HOSPITAL | Age: 37
End: 2022-04-21

## 2022-04-21 ENCOUNTER — APPOINTMENT (OUTPATIENT)
Dept: GENERAL RADIOLOGY | Facility: HOSPITAL | Age: 37
End: 2022-04-21

## 2022-04-21 LAB
ALBUMIN SERPL-MCNC: 3.6 G/DL (ref 3.5–5.2)
ALBUMIN/GLOB SERPL: 1.2 G/DL
ALP SERPL-CCNC: 75 U/L (ref 39–117)
ALT SERPL W P-5'-P-CCNC: 19 U/L (ref 1–33)
ANION GAP SERPL CALCULATED.3IONS-SCNC: 11 MMOL/L (ref 5–15)
AST SERPL-CCNC: 21 U/L (ref 1–32)
B-HCG UR QL: NEGATIVE
BASOPHILS # BLD AUTO: 0.04 10*3/MM3 (ref 0–0.2)
BASOPHILS NFR BLD AUTO: 0.4 % (ref 0–1.5)
BH CV ECHO MEAS - AO MAX PG (FULL): 4.9 MMHG
BH CV ECHO MEAS - AO MAX PG: 7.7 MMHG
BH CV ECHO MEAS - AO MEAN PG (FULL): 1.8 MMHG
BH CV ECHO MEAS - AO MEAN PG: 3.5 MMHG
BH CV ECHO MEAS - AO ROOT AREA (BSA CORRECTED): 1.2
BH CV ECHO MEAS - AO ROOT AREA: 5.3 CM^2
BH CV ECHO MEAS - AO ROOT DIAM: 2.6 CM
BH CV ECHO MEAS - AO V2 MAX: 139 CM/SEC
BH CV ECHO MEAS - AO V2 MEAN: 85.5 CM/SEC
BH CV ECHO MEAS - AO V2 VTI: 22 CM
BH CV ECHO MEAS - ASC AORTA: 2.7 CM
BH CV ECHO MEAS - AVA(I,A): 2.6 CM^2
BH CV ECHO MEAS - AVA(I,D): 2.6 CM^2
BH CV ECHO MEAS - AVA(V,A): 2.4 CM^2
BH CV ECHO MEAS - AVA(V,D): 2.4 CM^2
BH CV ECHO MEAS - BSA(HAYCOCK): 2.4 M^2
BH CV ECHO MEAS - BSA: 2.2 M^2
BH CV ECHO MEAS - BZI_BMI: 46.1 KILOGRAMS/M^2
BH CV ECHO MEAS - BZI_METRIC_HEIGHT: 160 CM
BH CV ECHO MEAS - BZI_METRIC_WEIGHT: 117.9 KG
BH CV ECHO MEAS - DIST REN A EDV LEFT: 15.4 CM/SEC
BH CV ECHO MEAS - DIST REN A PSV LEFT: 54 CM/SEC
BH CV ECHO MEAS - DIST REN A RI LEFT: 0.71
BH CV ECHO MEAS - EDV(CUBED): 112.1 ML
BH CV ECHO MEAS - EDV(MOD-SP2): 90 ML
BH CV ECHO MEAS - EDV(MOD-SP4): 77 ML
BH CV ECHO MEAS - EDV(TEICH): 108.6 ML
BH CV ECHO MEAS - EF(CUBED): 58.6 %
BH CV ECHO MEAS - EF(MOD-BP): 55 %
BH CV ECHO MEAS - EF(MOD-SP2): 52.2 %
BH CV ECHO MEAS - EF(MOD-SP4): 55.8 %
BH CV ECHO MEAS - EF(TEICH): 50.1 %
BH CV ECHO MEAS - ESV(CUBED): 46.4 ML
BH CV ECHO MEAS - ESV(MOD-SP2): 43 ML
BH CV ECHO MEAS - ESV(MOD-SP4): 34 ML
BH CV ECHO MEAS - ESV(TEICH): 54.2 ML
BH CV ECHO MEAS - FS: 25.5 %
BH CV ECHO MEAS - IVS/LVPW: 1.1
BH CV ECHO MEAS - IVSD: 1.4 CM
BH CV ECHO MEAS - LA DIMENSION: 3.9 CM
BH CV ECHO MEAS - LAD MAJOR: 5.8 CM
BH CV ECHO MEAS - LAT PEAK E' VEL: 11.8 CM/SEC
BH CV ECHO MEAS - LATERAL E/E' RATIO: 9.3
BH CV ECHO MEAS - LV DIASTOLIC VOL/BSA (35-75): 35.6 ML/M^2
BH CV ECHO MEAS - LV IVRT: 0.07 SEC
BH CV ECHO MEAS - LV MASS(C)D: 263.7 GRAMS
BH CV ECHO MEAS - LV MASS(C)DI: 122 GRAMS/M^2
BH CV ECHO MEAS - LV MAX PG: 2.8 MMHG
BH CV ECHO MEAS - LV MEAN PG: 1.7 MMHG
BH CV ECHO MEAS - LV SYSTOLIC VOL/BSA (12-30): 15.7 ML/M^2
BH CV ECHO MEAS - LV V1 MAX: 83.9 CM/SEC
BH CV ECHO MEAS - LV V1 MEAN: 60.7 CM/SEC
BH CV ECHO MEAS - LV V1 VTI: 14.5 CM
BH CV ECHO MEAS - LVIDD: 4.8 CM
BH CV ECHO MEAS - LVIDS: 3.6 CM
BH CV ECHO MEAS - LVLD AP2: 8 CM
BH CV ECHO MEAS - LVLD AP4: 7.9 CM
BH CV ECHO MEAS - LVLS AP2: 7.2 CM
BH CV ECHO MEAS - LVLS AP4: 7.1 CM
BH CV ECHO MEAS - LVOT AREA (M): 3.8 CM^2
BH CV ECHO MEAS - LVOT AREA: 3.9 CM^2
BH CV ECHO MEAS - LVOT DIAM: 2.2 CM
BH CV ECHO MEAS - LVPWD: 1.3 CM
BH CV ECHO MEAS - MED PEAK E' VEL: 9 CM/SEC
BH CV ECHO MEAS - MEDIAL E/E' RATIO: 12.2
BH CV ECHO MEAS - MID REN A EDV LEFT: 19.7 CM/SEC
BH CV ECHO MEAS - MID REN A PSV LEFT: 62.2 CM/SEC
BH CV ECHO MEAS - MID REN A RI LEFT: 0.68
BH CV ECHO MEAS - MR ALIAS VEL: 37.1 CM/SEC
BH CV ECHO MEAS - MR ERO: 0.19 CM^2
BH CV ECHO MEAS - MR FLOW RATE: 108.8 CM^3/SEC
BH CV ECHO MEAS - MR MAX PG: 125 MMHG
BH CV ECHO MEAS - MR MAX VEL: 558.2 CM/SEC
BH CV ECHO MEAS - MR MEAN PG: 90.1 MMHG
BH CV ECHO MEAS - MR MEAN VEL: 458.2 CM/SEC
BH CV ECHO MEAS - MR PISA RADIUS: 0.68 CM
BH CV ECHO MEAS - MR PISA: 2.9 CM^2
BH CV ECHO MEAS - MR VOLUME: 29.6 ML
BH CV ECHO MEAS - MR VTI: 151.7 CM
BH CV ECHO MEAS - MV AREA (1 DIAM): 7.4 CM^2
BH CV ECHO MEAS - MV DEC TIME: 0.13 SEC
BH CV ECHO MEAS - MV DIAM: 3.1 CM
BH CV ECHO MEAS - MV E MAX VEL: 110.8 CM/SEC
BH CV ECHO MEAS - MV FLOW AREA(1DIAM): 7.4 CM^2
BH CV ECHO MEAS - MV MAX PG: 7.7 MMHG
BH CV ECHO MEAS - MV MEAN PG: 3.7 MMHG
BH CV ECHO MEAS - MV V2 MAX: 139 CM/SEC
BH CV ECHO MEAS - MV V2 MEAN: 92.3 CM/SEC
BH CV ECHO MEAS - MV V2 VTI: 19.3 CM
BH CV ECHO MEAS - MVA(VTI): 2.9 CM^2
BH CV ECHO MEAS - PA ACC SLOPE: 695.5 CM/SEC^2
BH CV ECHO MEAS - PA ACC TIME: 0.1 SEC
BH CV ECHO MEAS - PA MAX PG: 3.8 MMHG
BH CV ECHO MEAS - PA PR(ACCEL): 36.2 MMHG
BH CV ECHO MEAS - PA V2 MAX: 97.4 CM/SEC
BH CV ECHO MEAS - PROX REN A EDV LEFT: 22.7 CM/SEC
BH CV ECHO MEAS - PROX REN A PSV LEFT: 72.9 CM/SEC
BH CV ECHO MEAS - PROX REN A RI LEFT: 0.69
BH CV ECHO MEAS - RAP SYSTOLE: 3 MMHG
BH CV ECHO MEAS - RF(MV,AO)(1 DIAM): 0.19
BH CV ECHO MEAS - RF(MV,LVOT)(1DIAM): 0.6
BH CV ECHO MEAS - RVSP: 35 MMHG
BH CV ECHO MEAS - SI(AO): 53.4 ML/M^2
BH CV ECHO MEAS - SI(CUBED): 30.4 ML/M^2
BH CV ECHO MEAS - SI(LVOT): 26.3 ML/M^2
BH CV ECHO MEAS - SI(MOD-SP2): 21.7 ML/M^2
BH CV ECHO MEAS - SI(MOD-SP4): 19.9 ML/M^2
BH CV ECHO MEAS - SI(MV 1 DIAM): 65.9 ML/M^2
BH CV ECHO MEAS - SI(TEICH): 25.2 ML/M^2
BH CV ECHO MEAS - SV(AO): 115.5 ML
BH CV ECHO MEAS - SV(CUBED): 65.6 ML
BH CV ECHO MEAS - SV(LVOT): 56.8 ML
BH CV ECHO MEAS - SV(MOD-SP2): 47 ML
BH CV ECHO MEAS - SV(MOD-SP4): 43 ML
BH CV ECHO MEAS - SV(MV 1 DIAM): 142.4 ML
BH CV ECHO MEAS - SV(TEICH): 54.4 ML
BH CV ECHO MEAS - TR MAX PG: 32 MMHG
BH CV ECHO MEAS - TR MAX VEL: 282.4 CM/SEC
BH CV ECHO MEASUREMENTS AVERAGE E/E' RATIO: 10.65
BH CV VAS BP RIGHT ARM: NORMAL MMHG
BH CV VAS BP RIGHT ARM: NORMAL MMHG
BH CV VAS KIDNEY HEIGHT LEFT: 2.4 CM
BH CV VAS RENAL AORTIC MID PSV: 109 CM/S
BH CV VAS RENAL HILUM LEFT EDV: 9 CM/S
BH CV VAS RENAL HILUM LEFT PSV: 80 CM/S
BH CV VAS RENAL HILUM RIGHT EDV: 10 CM/S
BH CV VAS RENAL HILUM RIGHT PSV: 27 CM/S
BH CV XCLRA SUP ARC RI LEFT: 0.55
BH CV XLRA - RV BASE: 2.8 CM
BH CV XLRA - RV LENGTH: 8 CM
BH CV XLRA - RV MID: 2 CM
BH CV XLRA MEAS - KID L LEFT: 9.9 CM
BH CV XLRA MEAS - RENAL A ORG RI LEFT: 0.67
BH CV XLRA MEAS - SUP ARC PSV LEFT: 13.7 CM/SEC
BH CV XLRA MEAS - SUP REN AO PSV: 109.3 CM/SEC
BH CV XLRA MEAS - SUP SEG EDV LEFT: 12.4 CM/SEC
BH CV XLRA MEAS - SUP SEG PSV LEFT: 32.2 CM/SEC
BH CV XLRA MEAS - SUP SEG RI LEFT: 0.61
BH CV XLRA MEAS DIST REN A EDV RIGHT: 13.4 CM/SEC
BH CV XLRA MEAS DIST REN A PSV RIGHT: 43.7 CM/SEC
BH CV XLRA MEAS DIST REN A RI RIGHT: 0.69
BH CV XLRA MEAS HILAR A EDV RIGHT: 9.8 CM/SEC
BH CV XLRA MEAS HILAR A PSV RIGHT: 27.2 CM/SEC
BH CV XLRA MEAS HILAR A RI RIGHT: 0.64
BH CV XLRA MEAS INF ARC EDV LEFT: 7.3 CM/SEC
BH CV XLRA MEAS INF ARC EDV RIGHT: 6.6 CM/SEC
BH CV XLRA MEAS INF ARC PSV LEFT: 16.1 CM/SEC
BH CV XLRA MEAS INF ARC PSV RIGHT: 19.6 CM/SEC
BH CV XLRA MEAS INF ARC RI LEFT: 0.55
BH CV XLRA MEAS INF ARC RI RIGHT: 0.66
BH CV XLRA MEAS INF SEG EDV LEFT: 9.5 CM/SEC
BH CV XLRA MEAS INF SEG EDV RIGHT: 9.8 CM/SEC
BH CV XLRA MEAS INF SEG PSV LEFT: 27.9 CM/SEC
BH CV XLRA MEAS INF SEG PSV RIGHT: 21.8 CM/SEC
BH CV XLRA MEAS INF SEG RI LEFT: 0.66
BH CV XLRA MEAS INF SEG RI RIGHT: 0.55
BH CV XLRA MEAS KID H RIGHT: 4.6 CM
BH CV XLRA MEAS KID L RIGHT: 9.4 CM
BH CV XLRA MEAS KID W RIGHT: 4.9 CM
BH CV XLRA MEAS MID REN A EDV RIGHT: 41.2 CM/SEC
BH CV XLRA MEAS MID REN A PSV RIGHT: 91.7 CM/SEC
BH CV XLRA MEAS MID REN A RI RIGHT: 0.55
BH CV XLRA MEAS PROX REN A EDV RIGHT: 25.5 CM/SEC
BH CV XLRA MEAS PROX REN A PSV RIGHT: 51.6 CM/SEC
BH CV XLRA MEAS PROX REN A RI RIGHT: 0.51
BH CV XLRA MEAS RAR LEFT: 0.6
BH CV XLRA MEAS RAR RIGHT: 0.8
BH CV XLRA MEAS RENAL A ORG EDV LEFT: 21 CM/SEC
BH CV XLRA MEAS RENAL A ORG EDV RIGHT: 21 CM/SEC
BH CV XLRA MEAS RENAL A ORG PSV LEFT: 64.2 CM/SEC
BH CV XLRA MEAS RENAL A ORG PSV RIGHT: 59.2 CM/SEC
BH CV XLRA MEAS RENAL A ORG RI RIGHT: 0.64
BH CV XLRA MEAS SUP ARC EDV RIGHT: 9 CM/SEC
BH CV XLRA MEAS SUP ARC PSV RIGHT: 19.1 CM/SEC
BH CV XLRA MEAS SUP ARC RI RIGHT: 0.53
BH CV XLRA MEAS SUP SEG PSV RIGHT: 45.5 CM/SEC
BH CV XLRA SUP ARC EDV LEFT: 6.1 CM/SEC
BILIRUB SERPL-MCNC: 0.5 MG/DL (ref 0–1.2)
BUN SERPL-MCNC: 10 MG/DL (ref 6–20)
BUN/CREAT SERPL: 14.9 (ref 7–25)
CA-I SERPL ISE-MCNC: 1.24 MMOL/L (ref 1.12–1.32)
CALCIUM SPEC-SCNC: 8.5 MG/DL (ref 8.6–10.5)
CHLORIDE SERPL-SCNC: 102 MMOL/L (ref 98–107)
CO2 SERPL-SCNC: 25 MMOL/L (ref 22–29)
CREAT SERPL-MCNC: 0.67 MG/DL (ref 0.57–1)
CREAT UR-MCNC: 13.8 MG/DL
DEPRECATED RDW RBC AUTO: 46 FL (ref 37–54)
EGFRCR SERPLBLD CKD-EPI 2021: 114.9 ML/MIN/1.73
EOSINOPHIL # BLD AUTO: 0.04 10*3/MM3 (ref 0–0.4)
EOSINOPHIL NFR BLD AUTO: 0.4 % (ref 0.3–6.2)
ERYTHROCYTE [DISTWIDTH] IN BLOOD BY AUTOMATED COUNT: 16 % (ref 12.3–15.4)
GLOBULIN UR ELPH-MCNC: 3 GM/DL
GLUCOSE BLDC GLUCOMTR-MCNC: 106 MG/DL (ref 70–130)
GLUCOSE BLDC GLUCOMTR-MCNC: 138 MG/DL (ref 70–130)
GLUCOSE BLDC GLUCOMTR-MCNC: 157 MG/DL (ref 70–130)
GLUCOSE BLDC GLUCOMTR-MCNC: 159 MG/DL (ref 70–130)
GLUCOSE BLDC GLUCOMTR-MCNC: 255 MG/DL (ref 70–130)
GLUCOSE BLDC GLUCOMTR-MCNC: 269 MG/DL (ref 70–130)
GLUCOSE SERPL-MCNC: 159 MG/DL (ref 65–99)
HCT VFR BLD AUTO: 31.9 % (ref 34–46.6)
HGB BLD-MCNC: 9.9 G/DL (ref 12–15.9)
IMM GRANULOCYTES # BLD AUTO: 0.03 10*3/MM3 (ref 0–0.05)
IMM GRANULOCYTES NFR BLD AUTO: 0.3 % (ref 0–0.5)
IVRT: 70 MSEC
LEFT ATRIUM VOLUME INDEX: 28.7 ML/M^2
LEFT ATRIUM VOLUME: 62 ML
LEFT KIDNEY WIDTH: 6 CM
LEFT RENAL UPPER PARENCHYMA MAX: 16 CM/S
LEFT RENAL UPPER PARENCHYMA MIN: 7 CM/S
LEFT RENAL UPPER PARENCHYMA RI: 0.56
LYMPHOCYTES # BLD AUTO: 2.44 10*3/MM3 (ref 0.7–3.1)
LYMPHOCYTES NFR BLD AUTO: 23 % (ref 19.6–45.3)
MAGNESIUM SERPL-MCNC: 1.7 MG/DL (ref 1.6–2.6)
MAXIMAL PREDICTED HEART RATE: 182 BPM
MCH RBC QN AUTO: 24.9 PG (ref 26.6–33)
MCHC RBC AUTO-ENTMCNC: 31 G/DL (ref 31.5–35.7)
MCV RBC AUTO: 80.2 FL (ref 79–97)
MONOCYTES # BLD AUTO: 0.76 10*3/MM3 (ref 0.1–0.9)
MONOCYTES NFR BLD AUTO: 7.2 % (ref 5–12)
MR PISA EROA: 0.2 CM2
MV REGURGITANT FRACTION: 21 %
MV VENA CONTRACTA: 0.54 CM
NEUTROPHILS NFR BLD AUTO: 68.7 % (ref 42.7–76)
NEUTROPHILS NFR BLD AUTO: 7.3 10*3/MM3 (ref 1.7–7)
NRBC BLD AUTO-RTO: 0 /100 WBC (ref 0–0.2)
PHOSPHATE SERPL-MCNC: 3.7 MG/DL (ref 2.5–4.5)
PISA ALIASING VEL: 3.7 M/S
PISA RADIUS: 0.7 CM
PLATELET # BLD AUTO: 367 10*3/MM3 (ref 140–450)
PMV BLD AUTO: 11.2 FL (ref 6–12)
POTASSIUM SERPL-SCNC: 4.2 MMOL/L (ref 3.5–5.2)
PROT SERPL-MCNC: 6.6 G/DL (ref 6–8.5)
RBC # BLD AUTO: 3.98 10*6/MM3 (ref 3.77–5.28)
RIGHT RENAL UPPER PARENCHYMA MAX: 19 CM/S
RIGHT RENAL UPPER PARENCHYMA MIN: 9 CM/S
RIGHT RENAL UPPER PARENCHYMA RI: 0.53
SODIUM SERPL-SCNC: 138 MMOL/L (ref 136–145)
STRESS TARGET HR: 155 BPM
WBC NRBC COR # BLD: 10.61 10*3/MM3 (ref 3.4–10.8)

## 2022-04-21 PROCEDURE — 93975 VASCULAR STUDY: CPT

## 2022-04-21 PROCEDURE — 93306 TTE W/DOPPLER COMPLETE: CPT

## 2022-04-21 PROCEDURE — 0 MAGNESIUM SULFATE 4 GM/100ML SOLUTION: Performed by: INTERNAL MEDICINE

## 2022-04-21 PROCEDURE — 83735 ASSAY OF MAGNESIUM: CPT | Performed by: INTERNAL MEDICINE

## 2022-04-21 PROCEDURE — 85025 COMPLETE CBC W/AUTO DIFF WBC: CPT | Performed by: INTERNAL MEDICINE

## 2022-04-21 PROCEDURE — 84100 ASSAY OF PHOSPHORUS: CPT | Performed by: INTERNAL MEDICINE

## 2022-04-21 PROCEDURE — 81025 URINE PREGNANCY TEST: CPT | Performed by: NURSE PRACTITIONER

## 2022-04-21 PROCEDURE — 63710000001 INSULIN DETEMIR PER 5 UNITS: Performed by: INTERNAL MEDICINE

## 2022-04-21 PROCEDURE — 82330 ASSAY OF CALCIUM: CPT | Performed by: INTERNAL MEDICINE

## 2022-04-21 PROCEDURE — 71045 X-RAY EXAM CHEST 1 VIEW: CPT

## 2022-04-21 PROCEDURE — 63710000001 INSULIN LISPRO (HUMAN) PER 5 UNITS: Performed by: NURSE PRACTITIONER

## 2022-04-21 PROCEDURE — 63710000001 INSULIN LISPRO (HUMAN) PER 5 UNITS: Performed by: INTERNAL MEDICINE

## 2022-04-21 PROCEDURE — 80053 COMPREHEN METABOLIC PANEL: CPT | Performed by: INTERNAL MEDICINE

## 2022-04-21 PROCEDURE — 82962 GLUCOSE BLOOD TEST: CPT

## 2022-04-21 PROCEDURE — 99233 SBSQ HOSP IP/OBS HIGH 50: CPT | Performed by: NURSE PRACTITIONER

## 2022-04-21 RX ORDER — ECHINACEA PURPUREA EXTRACT 125 MG
1 TABLET ORAL AS NEEDED
Status: DISCONTINUED | OUTPATIENT
Start: 2022-04-21 | End: 2022-04-22 | Stop reason: HOSPADM

## 2022-04-21 RX ORDER — MAGNESIUM SULFATE HEPTAHYDRATE 40 MG/ML
4 INJECTION, SOLUTION INTRAVENOUS AS NEEDED
Status: DISCONTINUED | OUTPATIENT
Start: 2022-04-21 | End: 2022-04-22 | Stop reason: HOSPADM

## 2022-04-21 RX ORDER — FAMOTIDINE 20 MG/1
20 TABLET, FILM COATED ORAL DAILY
Status: DISCONTINUED | OUTPATIENT
Start: 2022-04-21 | End: 2022-04-22 | Stop reason: HOSPADM

## 2022-04-21 RX ORDER — LISINOPRIL 20 MG/1
20 TABLET ORAL
Status: DISCONTINUED | OUTPATIENT
Start: 2022-04-21 | End: 2022-04-21

## 2022-04-21 RX ORDER — MAGNESIUM SULFATE HEPTAHYDRATE 40 MG/ML
2 INJECTION, SOLUTION INTRAVENOUS AS NEEDED
Status: DISCONTINUED | OUTPATIENT
Start: 2022-04-21 | End: 2022-04-22 | Stop reason: HOSPADM

## 2022-04-21 RX ORDER — AMLODIPINE BESYLATE 10 MG/1
10 TABLET ORAL
Status: DISCONTINUED | OUTPATIENT
Start: 2022-04-21 | End: 2022-04-22 | Stop reason: HOSPADM

## 2022-04-21 RX ORDER — LABETALOL HYDROCHLORIDE 5 MG/ML
10 INJECTION, SOLUTION INTRAVENOUS EVERY 4 HOURS PRN
Status: DISCONTINUED | OUTPATIENT
Start: 2022-04-21 | End: 2022-04-22

## 2022-04-21 RX ADMIN — ACETAMINOPHEN 650 MG: 325 TABLET ORAL at 15:39

## 2022-04-21 RX ADMIN — POTASSIUM CHLORIDE 40 MEQ: 750 CAPSULE, EXTENDED RELEASE ORAL at 01:10

## 2022-04-21 RX ADMIN — INSULIN DETEMIR 10 UNITS: 100 INJECTION, SOLUTION SUBCUTANEOUS at 20:22

## 2022-04-21 RX ADMIN — ACETAMINOPHEN 650 MG: 325 TABLET ORAL at 02:22

## 2022-04-21 RX ADMIN — LABETALOL 20 MG/4 ML (5 MG/ML) INTRAVENOUS SYRINGE 10 MG: at 12:41

## 2022-04-21 RX ADMIN — INSULIN LISPRO 4 UNITS: 100 INJECTION, SOLUTION INTRAVENOUS; SUBCUTANEOUS at 18:02

## 2022-04-21 RX ADMIN — MAGNESIUM SULFATE HEPTAHYDRATE 4 G: 40 INJECTION, SOLUTION INTRAVENOUS at 09:32

## 2022-04-21 RX ADMIN — INSULIN LISPRO 5 UNITS: 100 INJECTION, SOLUTION INTRAVENOUS; SUBCUTANEOUS at 18:02

## 2022-04-21 RX ADMIN — FAMOTIDINE 20 MG: 20 TABLET ORAL at 12:40

## 2022-04-21 RX ADMIN — INSULIN LISPRO 5 UNITS: 100 INJECTION, SOLUTION INTRAVENOUS; SUBCUTANEOUS at 13:02

## 2022-04-21 RX ADMIN — AMLODIPINE BESYLATE 10 MG: 10 TABLET ORAL at 08:21

## 2022-04-21 RX ADMIN — ACETAMINOPHEN 650 MG: 325 TABLET ORAL at 22:27

## 2022-04-21 RX ADMIN — LABETALOL 20 MG/4 ML (5 MG/ML) INTRAVENOUS SYRINGE 10 MG: at 20:21

## 2022-04-22 ENCOUNTER — READMISSION MANAGEMENT (OUTPATIENT)
Dept: CALL CENTER | Facility: HOSPITAL | Age: 37
End: 2022-04-22

## 2022-04-22 ENCOUNTER — APPOINTMENT (OUTPATIENT)
Dept: GENERAL RADIOLOGY | Facility: HOSPITAL | Age: 37
End: 2022-04-22

## 2022-04-22 VITALS
SYSTOLIC BLOOD PRESSURE: 144 MMHG | OXYGEN SATURATION: 99 % | HEART RATE: 93 BPM | DIASTOLIC BLOOD PRESSURE: 96 MMHG | BODY MASS INDEX: 46.07 KG/M2 | HEIGHT: 63 IN | WEIGHT: 260 LBS | RESPIRATION RATE: 18 BRPM | TEMPERATURE: 98.1 F

## 2022-04-22 LAB
ANION GAP SERPL CALCULATED.3IONS-SCNC: 9 MMOL/L (ref 5–15)
BUN SERPL-MCNC: 10 MG/DL (ref 6–20)
BUN/CREAT SERPL: 13.9 (ref 7–25)
CALCIUM SPEC-SCNC: 8.8 MG/DL (ref 8.6–10.5)
CHLORIDE SERPL-SCNC: 101 MMOL/L (ref 98–107)
CO2 SERPL-SCNC: 26 MMOL/L (ref 22–29)
CREAT SERPL-MCNC: 0.72 MG/DL (ref 0.57–1)
DEPRECATED RDW RBC AUTO: 47.8 FL (ref 37–54)
EGFRCR SERPLBLD CKD-EPI 2021: 109.9 ML/MIN/1.73
ERYTHROCYTE [DISTWIDTH] IN BLOOD BY AUTOMATED COUNT: 15.9 % (ref 12.3–15.4)
GLUCOSE BLDC GLUCOMTR-MCNC: 131 MG/DL (ref 70–130)
GLUCOSE BLDC GLUCOMTR-MCNC: 195 MG/DL (ref 70–130)
GLUCOSE SERPL-MCNC: 121 MG/DL (ref 65–99)
HCT VFR BLD AUTO: 33.7 % (ref 34–46.6)
HGB BLD-MCNC: 10.3 G/DL (ref 12–15.9)
MAGNESIUM SERPL-MCNC: 2.1 MG/DL (ref 1.6–2.6)
MCH RBC QN AUTO: 25.1 PG (ref 26.6–33)
MCHC RBC AUTO-ENTMCNC: 30.6 G/DL (ref 31.5–35.7)
MCV RBC AUTO: 82 FL (ref 79–97)
PHOSPHATE SERPL-MCNC: 4.3 MG/DL (ref 2.5–4.5)
PLATELET # BLD AUTO: 372 10*3/MM3 (ref 140–450)
PMV BLD AUTO: 10.7 FL (ref 6–12)
POTASSIUM SERPL-SCNC: 4.7 MMOL/L (ref 3.5–5.2)
RBC # BLD AUTO: 4.11 10*6/MM3 (ref 3.77–5.28)
SODIUM SERPL-SCNC: 136 MMOL/L (ref 136–145)
WBC NRBC COR # BLD: 8.81 10*3/MM3 (ref 3.4–10.8)

## 2022-04-22 PROCEDURE — 99238 HOSP IP/OBS DSCHRG MGMT 30/<: CPT | Performed by: NURSE PRACTITIONER

## 2022-04-22 PROCEDURE — 85027 COMPLETE CBC AUTOMATED: CPT | Performed by: NURSE PRACTITIONER

## 2022-04-22 PROCEDURE — 83735 ASSAY OF MAGNESIUM: CPT | Performed by: INTERNAL MEDICINE

## 2022-04-22 PROCEDURE — 63710000001 INSULIN LISPRO (HUMAN) PER 5 UNITS: Performed by: INTERNAL MEDICINE

## 2022-04-22 PROCEDURE — 63710000001 INSULIN LISPRO (HUMAN) PER 5 UNITS: Performed by: NURSE PRACTITIONER

## 2022-04-22 PROCEDURE — 80048 BASIC METABOLIC PNL TOTAL CA: CPT | Performed by: NURSE PRACTITIONER

## 2022-04-22 PROCEDURE — 84100 ASSAY OF PHOSPHORUS: CPT | Performed by: NURSE PRACTITIONER

## 2022-04-22 PROCEDURE — 82962 GLUCOSE BLOOD TEST: CPT

## 2022-04-22 PROCEDURE — 71045 X-RAY EXAM CHEST 1 VIEW: CPT

## 2022-04-22 RX ORDER — LISINOPRIL 40 MG/1
40 TABLET ORAL
Status: DISCONTINUED | OUTPATIENT
Start: 2022-04-22 | End: 2022-04-22 | Stop reason: HOSPADM

## 2022-04-22 RX ORDER — LISINOPRIL 40 MG/1
40 TABLET ORAL
Qty: 30 TABLET | Refills: 3 | Status: SHIPPED | OUTPATIENT
Start: 2022-04-23 | End: 2022-04-25 | Stop reason: SDUPTHER

## 2022-04-22 RX ADMIN — INSULIN LISPRO 5 UNITS: 100 INJECTION, SOLUTION INTRAVENOUS; SUBCUTANEOUS at 12:09

## 2022-04-22 RX ADMIN — INSULIN LISPRO 5 UNITS: 100 INJECTION, SOLUTION INTRAVENOUS; SUBCUTANEOUS at 08:06

## 2022-04-22 RX ADMIN — FAMOTIDINE 20 MG: 20 TABLET ORAL at 08:07

## 2022-04-22 RX ADMIN — SALINE NASAL SPRAY 1 SPRAY: 1.5 SOLUTION NASAL at 12:12

## 2022-04-22 RX ADMIN — AMLODIPINE BESYLATE 10 MG: 10 TABLET ORAL at 08:06

## 2022-04-22 RX ADMIN — INSULIN LISPRO 2 UNITS: 100 INJECTION, SOLUTION INTRAVENOUS; SUBCUTANEOUS at 12:10

## 2022-04-22 RX ADMIN — LISINOPRIL 40 MG: 40 TABLET ORAL at 09:36

## 2022-04-22 RX ADMIN — LABETALOL 20 MG/4 ML (5 MG/ML) INTRAVENOUS SYRINGE 10 MG: at 00:09

## 2022-04-23 LAB
QT INTERVAL: 306 MS
QTC INTERVAL: 455 MS

## 2022-04-23 NOTE — OUTREACH NOTE
Prep Survey    Flowsheet Row Responses   [facility] patient discharged from? [place]   Is LACE score < 7 ? No   Emergency Room discharge w/ pulse ox? No   Eligibility [facility]   Date of Admission 04/20/22   Date of Discharge 04/22/22   Discharge Disposition Home or Self Care   Discharge diagnosis Hypertensive emergency   Does the patient have one of the following disease processes/diagnoses(primary or secondary)? Other   Does the patient have Home health ordered? No   Is there a DME ordered? No   Prep survey completed? Yes          JASPER CHAMBERLAIN - Registered Nurse

## 2022-04-25 ENCOUNTER — TRANSITIONAL CARE MANAGEMENT TELEPHONE ENCOUNTER (OUTPATIENT)
Dept: CALL CENTER | Facility: HOSPITAL | Age: 37
End: 2022-04-25

## 2022-04-25 ENCOUNTER — OFFICE VISIT (OUTPATIENT)
Dept: INTERNAL MEDICINE | Facility: CLINIC | Age: 37
End: 2022-04-25

## 2022-04-25 VITALS
BODY MASS INDEX: 44.65 KG/M2 | WEIGHT: 252 LBS | DIASTOLIC BLOOD PRESSURE: 98 MMHG | SYSTOLIC BLOOD PRESSURE: 142 MMHG | HEART RATE: 76 BPM | HEIGHT: 63 IN | TEMPERATURE: 98.4 F

## 2022-04-25 DIAGNOSIS — E66.01 MORBID OBESITY WITH BODY MASS INDEX (BMI) OF 40.0 TO 49.9: ICD-10-CM

## 2022-04-25 DIAGNOSIS — E11.9 TYPE 2 DIABETES MELLITUS WITHOUT COMPLICATION, WITHOUT LONG-TERM CURRENT USE OF INSULIN: ICD-10-CM

## 2022-04-25 DIAGNOSIS — I10 ESSENTIAL HYPERTENSION: Primary | ICD-10-CM

## 2022-04-25 PROCEDURE — 99496 TRANSJ CARE MGMT HIGH F2F 7D: CPT | Performed by: PHYSICIAN ASSISTANT

## 2022-04-25 RX ORDER — CETIRIZINE HYDROCHLORIDE 10 MG/1
10 TABLET ORAL DAILY
Qty: 90 TABLET | Refills: 1 | Status: SHIPPED | OUTPATIENT
Start: 2022-04-25

## 2022-04-25 RX ORDER — LISINOPRIL 40 MG/1
40 TABLET ORAL
Qty: 30 TABLET | Refills: 3 | Status: SHIPPED | OUTPATIENT
Start: 2022-04-25 | End: 2022-09-21

## 2022-04-25 RX ORDER — AMLODIPINE BESYLATE 10 MG/1
10 TABLET ORAL DAILY
Qty: 90 TABLET | Refills: 1 | Status: SHIPPED | OUTPATIENT
Start: 2022-04-25 | End: 2022-10-26

## 2022-04-25 NOTE — PROGRESS NOTES
Transitional Care Follow Up Visit  Subjective     Deanna Putnam is a 36 y.o. female who presents for a transitional care management visit.      Within 48 business hours after discharge our office contacted her via telephone to coordinate her care and needs.      I reviewed and discussed the details of that call along with the discharge summary, hospital problems, inpatient lab results, inpatient diagnostic studies, and consultation reports with Deanna.     Current outpatient and discharge medications have been reconciled for the patient.  Reviewed by: Roberta Rodriguez PA-C      Date of TCM Phone Call 4/22/2022   [facility]   Date of Admission 4/20/2022   Date of Discharge 4/22/2022   Discharge Disposition Home or Self Care     Risk for Readmission (LACE) Score: 8 (4/22/2022  6:01 AM)      History of Present Illness   Course During Hospital Stay:      Deanna Putnam is 36-year-old female, 1985, who presents for hospital follow-up. She was admitted to [facility] on 04/20/2022 for hypertensive crisis and shortness of breath.     Upon her admission at the hospital she was experiencing shortness of breath, chest discomfort, edema, and her blood pressure upon being admitted was 233/166 mmHg with a repeat blood pressure of 214/135 mmHg. The patient was placed on IV infusions to lower her blood pressure. She states the hospital staff attributed her cardiac findings while hospitalized to her significantly elevated blood pressure. The patient notes she has an appointment with cardiology in 10/2022 to follow-up regarding the cardiac findings while hospitalized. She has an appointment with Dr. Bowden on 08/22/2022. The patient adds that she was originally scheduled to see him in approximately 09/2020; however, this was the period of time that her mother's health was declining and she cancelled her appointment secondary to her mother's declining health. She reports taking lisinopril 40 mg and  amlodipine 10 mg since being discharged from the hospital; these are the only medications she currently endorses taking. The patient reports she has kept up with taking Trulicity as she does not experience side effects. She reports when taking metformin caused more frequent bowel movements and this does not work with her job as a . The patient complains when she took lisinopril and amlodipine together after returning home from her father's house she developed a headache; therefore, she is unsure if she should continue to take these medications together. She reports she has not experienced chest pain since being discharged from the hospital and her shortness of breath has improved; however, she remains to experience mildly. The patient admits to not taking her medication secondary to significant stress from personal matters. She states her recent hospital visit has sparked determination in improving her health and life. The patient is requesting a letter for her job regarding her absence while she as hospitalized.    She endorses being in need of refills of Zyrtec and Flonase, as well as all of her other chronic medications.    The patient requests to have her ears examined secondary to bilateral ear pain. She adds she informed hospital staff of this; however, her ears were never examined while in the hospital. The patient notes her ear pain is random and intermittent. She blood pressure on 04/25/2022 is 146/98 mmHg. The patient denies monitoring her blood pressure at home secondary to not having a monitor to use. She states she plans to obtain a blood pressure monitor when she obtains her medications today.     The patient endorses her bowel movements remain normal. She denies nown edema.     Her mother passed away, her best friend's mother passed away, her cousin passed away, and her aunt passed away; this all occurred from 11/2021 to present. She notes her oldest daughter was obviously adopted 2  "days prior to her mother being admitted to the hospital.     The following portions of the patient's history were reviewed and updated as appropriate: allergies, current medications, past family history, past medical history, past social history, past surgical history and problem list.    Vitals:    04/25/22 1504 04/25/22 1548   BP: 136/84 142/98   BP Location: Left arm    Patient Position: Sitting    Cuff Size: Large Adult    Pulse: 76    Temp: 98.4 °F (36.9 °C)    TempSrc: Temporal    Weight: 114 kg (252 lb)    Height: 160 cm (62.99\")    PainSc: 0-No pain      Body mass index is 44.65 kg/m².  Patient's Body mass index is 44.65 kg/m². indicating that she is morbidly obese (BMI > 40 or > 35 with obesity - related health condition). Obesity-related health conditions include the following: obstructive sleep apnea, hypertension, diabetes mellitus and dyslipidemias. Obesity is unchanged. BMI is is above average; BMI management plan is completed. We discussed low calorie, low carb based diet program, portion control and increasing exercise..       Review of Systems   Constitutional: Negative for chills, fatigue and fever.   HENT: Positive for ear pain. Negative for congestion, rhinorrhea, sinus pressure and sore throat.    Respiratory: Positive for shortness of breath. Negative for cough, chest tightness and wheezing.    Cardiovascular: Positive for chest pain. Negative for palpitations and leg swelling.   Gastrointestinal: Negative for abdominal pain, blood in stool, constipation and vomiting.   Musculoskeletal: Negative for neck pain.   Skin: Negative for color change and rash.   Allergic/Immunologic: Negative for environmental allergies.   Neurological: Negative for dizziness, speech difficulty and headaches.   Psychiatric/Behavioral: Negative for confusion. The patient is not nervous/anxious.        Social History     Socioeconomic History   • Marital status: Single   Tobacco Use   • Smoking status: Never Smoker   • " Smokeless tobacco: Never Used   Vaping Use   • Vaping Use: Never used   Substance and Sexual Activity   • Alcohol use: Yes     Alcohol/week: 2.0 standard drinks     Types: 2 Glasses of wine per week   • Drug use: No   • Sexual activity: Yes     Partners: Female, Male     Birth control/protection: Condom       Objective   Physical Exam  Vitals reviewed.   Constitutional:       Appearance: Normal appearance. She is well-developed. She is obese.   HENT:      Head: Normocephalic and atraumatic.      Right Ear: Hearing, tympanic membrane, ear canal and external ear normal.      Left Ear: Hearing, tympanic membrane, ear canal and external ear normal.      Nose: Nose normal.      Mouth/Throat:      Mouth: Mucous membranes are moist.      Pharynx: Oropharynx is clear. Uvula midline.   Eyes:      General: Lids are normal.      Conjunctiva/sclera: Conjunctivae normal.      Pupils: Pupils are equal, round, and reactive to light.   Cardiovascular:      Rate and Rhythm: Normal rate and regular rhythm.      Pulses: Normal pulses.      Heart sounds: Normal heart sounds.   Pulmonary:      Effort: Pulmonary effort is normal.      Breath sounds: Normal breath sounds.   Abdominal:      General: Bowel sounds are normal.      Palpations: Abdomen is soft.   Musculoskeletal:         General: Normal range of motion.      Cervical back: Full passive range of motion without pain, normal range of motion and neck supple.   Skin:     General: Skin is warm and dry.   Neurological:      General: No focal deficit present.      Mental Status: She is alert and oriented to person, place, and time. Mental status is at baseline.      Deep Tendon Reflexes: Reflexes are normal and symmetric.   Psychiatric:         Speech: Speech normal.         Behavior: Behavior normal.         Thought Content: Thought content normal.         Judgment: Judgment normal.         Assessment/Plan   Problem List Items Addressed This Visit        Cardiac and Vasculature     Essential hypertension - Primary (Chronic)    Relevant Medications    lisinopril (PRINIVIL,ZESTRIL) 40 MG tablet    amLODIPine (NORVASC) 10 MG tablet    Other Relevant Orders    Ambulatory Referral to Bariatric Surgery       Endocrine and Metabolic    Morbid obesity (Chronic)    Relevant Orders    Ambulatory Referral to Bariatric Surgery    T2DM  (Chronic)    Relevant Medications    empagliflozin (Jardiance) 10 MG tablet tablet    Dulaglutide 0.75 MG/0.5ML solution pen-injector    Other Relevant Orders    Ambulatory Referral to Bariatric Surgery      She will follow-up in 1 month.         Roberta Rodriguez PA-C    Answers for HPI/ROS submitted by the patient on 4/20/2022  What is the primary reason for your visit?: Shortness of Breath      Transcribed from ambient dictation for Roberta Rodriguez PA-C by Mag Rodriguez.  04/25/22   17:24 EDT    Patient verbalized consent to the visit recording.

## 2022-04-25 NOTE — OUTREACH NOTE
Call Center TCM Note    Flowsheet Row Responses   [facility] patient discharged from? [place]   Does the patient have one of the following disease processes/diagnoses(primary or secondary)? Other   TCM attempt successful? No   Unsuccessful attempts Attempt 1          Isha Tse LPN    4/25/2022, 09:01 EDT

## 2022-04-25 NOTE — PROGRESS NOTES
[facility]     Deanna Putnam  1985   [id number]      Patient Care Team:  Roberta Rodriguez PA-C as PCP - General (Internal Medicine)  Leann Alejo DO (Inactive) as Consulting Physician (Obstetrics and Gynecology)  Jorge Alberto Arellano MD as Consulting Physician (Obstetrics and Gynecology)  Mentzer, Elizabeth Kathleen, PA (Physician Assistant)  Nolan Biswas MD as Consulting Physician (Gastroenterology)    Chief Complaint:: No chief complaint on file.           HPI  ***      Patient Active Problem List   Diagnosis   • Intramural and subserous leiomyoma of uterus   • Essential hypertension   • Morbid obesity   • Recurrent major depressive disorder, in full remission (HCC)   • Mild intermittent asthma without complication   • PCOS (polycystic ovarian syndrome)   • T2DM    • Arthralgia of both knees   • Screening, lipid   • Bloating   • Menorrhagia with regular cycle   • Hip pain, bilateral   • Vitamin D deficiency   • Hypertensive emergency   • Hyponatremia   • Medically noncompliant        Past Medical History:   Diagnosis Date   • Allergic    • Anemia    • Asthma    • Depression    • Diabetes mellitus (HCC)    • Hypertension    • Obesity    • Osteoarthritis of back    • Prediabetes        Past Surgical History:   Procedure Laterality Date   • CHOLECYSTECTOMY  2018   • DIAGNOSTIC LAPAROSCOPY  12/13/2018   • ENDOMETRIAL ABLATION  2018    Did not get endometriosis out   • EYE SURGERY  2015    eyelid lift   • GALLBLADDER SURGERY  04/2018   • TONSILLECTOMY     • WISDOM TOOTH EXTRACTION         Family History   Problem Relation Age of Onset   • Endometrial cancer Mother 59   • Diabetes Mother    • Hypertension Mother    • Cancer Mother    • Hyperlipidemia Mother    • Miscarriages / Stillbirths Mother    • Vision loss Mother    • Breast cancer Maternal Grandmother 80   • Colon cancer Maternal Grandmother 90   • Arthritis Maternal Grandmother    • Birth defects Maternal Grandmother     • Diabetes Maternal Grandmother    • Cancer Maternal Grandmother    • Hyperlipidemia Maternal Grandmother    • Vision loss Maternal Grandmother    • Breast cancer Maternal Aunt 60   • Cancer Maternal Aunt    • Diabetes Father    • Hypertension Father    • Hyperlipidemia Father    • Diabetes Paternal Grandmother    • Hypertension Paternal Grandmother    • Hyperlipidemia Paternal Grandmother    • Hypertension Paternal Grandfather    • Diabetes Paternal Grandfather    • Hyperlipidemia Paternal Grandfather    • Uterine cancer Neg Hx    • Ovarian cancer Neg Hx        Social History     Socioeconomic History   • Marital status: Single   Tobacco Use   • Smoking status: Never Smoker   • Smokeless tobacco: Never Used   Vaping Use   • Vaping Use: Never used   Substance and Sexual Activity   • Alcohol use: Yes     Alcohol/week: 2.0 standard drinks     Types: 2 Glasses of wine per week   • Drug use: No   • Sexual activity: Yes     Partners: Female, Male     Birth control/protection: Condom       Allergies   Allergen Reactions   • Eggs Or Egg-Derived Products Nausea And Vomiting   • Mold Extract [Trichophyton] Unknown (See Comments)     unknown       Review of Systems     HEENT: ***  NECK:  ***  CHEST: ***  CARDIAC: ***  ABD: ***  : ***  NEURO:  ***  PSYCH:  ***  EXTREM: ***  SKIN: ***    Vital Signs  There were no vitals filed for this visit.  There is no height or weight on file to calculate BMI.  Patient's There is no height or weight on file to calculate BMI. indicating that she is {weight categories:38898}.     Advance Care Planning   {Advance Directive Status:04550}       Current Outpatient Medications:   •  amLODIPine (NORVASC) 10 MG tablet, TAKE ONE TABLET BY MOUTH DAILY, Disp: 90 tablet, Rfl: 1  •  budesonide-formoterol (SYMBICORT) 160-4.5 MCG/ACT inhaler, Inhale 2 puffs 2 (Two) Times a Day., Disp: , Rfl:   •  cetirizine (zyrTEC) 10 MG tablet, Take 1 tablet by mouth Daily., Disp: , Rfl:   •  Dulaglutide 0.75  MG/0.5ML solution pen-injector, Inject 0.75 mg under the skin into the appropriate area as directed 1 (One) Time Per Week., Disp: 1 pen, Rfl: 5  •  fluticasone (FLONASE) 50 MCG/ACT nasal spray, 2 sprays into the nostril(s) as directed by provider Daily As Needed., Disp: , Rfl:   •  Jardiance 10 MG tablet, TAKE ONE TABLET BY MOUTH DAILY, Disp: 30 tablet, Rfl: 5  •  lisinopril (PRINIVIL,ZESTRIL) 40 MG tablet, Take 1 tablet by mouth Daily., Disp: 30 tablet, Rfl: 3  •  metFORMIN ER (GLUCOPHAGE-XR) 750 MG 24 hr tablet, Take 1 tablet by mouth Daily With Breakfast. (Patient not taking: Reported on 4/20/2022), Disp: 90 tablet, Rfl: 1  •  vitamin D (ERGOCALCIFEROL) 1.25 MG (02917 UT) capsule capsule, TAKE ONE CAPSULE BY MOUTH ONCE WEEKLY, Disp: 4 capsule, Rfl: 3    Physical Exam     ACE III MINI         HEENT: ***  NECK:  ***  CHEST: ***  CARDIAC: ***  ABD: ***  : ***  NEURO:  ***  PSYCH:  ***  EXTREM: ***  Skin:***     Results Review:    Recent Results (from the past 672 hour(s))   ECG 12 Lead    Collection Time: 04/20/22  1:19 PM   Result Value Ref Range    QT Interval 306 ms    QTC Interval 455 ms   Comprehensive Metabolic Panel    Collection Time: 04/20/22  1:42 PM    Specimen: Blood   Result Value Ref Range    Glucose 214 (H) 65 - 99 mg/dL    BUN 13 6 - 20 mg/dL    Creatinine 0.83 0.57 - 1.00 mg/dL    Sodium 125 (L) 136 - 145 mmol/L    Potassium 3.6 3.5 - 5.2 mmol/L    Chloride 91 (L) 98 - 107 mmol/L    CO2 26.0 22.0 - 29.0 mmol/L    Calcium 9.4 8.6 - 10.5 mg/dL    Total Protein 7.5 6.0 - 8.5 g/dL    Albumin 4.20 3.50 - 5.20 g/dL    ALT (SGPT) 17 1 - 33 U/L    AST (SGOT) 15 1 - 32 U/L    Alkaline Phosphatase 94 39 - 117 U/L    Total Bilirubin 0.5 0.0 - 1.2 mg/dL    Globulin 3.3 gm/dL    A/G Ratio 1.3 g/dL    BUN/Creatinine Ratio 15.7 7.0 - 25.0    Anion Gap 8.0 5.0 - 15.0 mmol/L    eGFR 92.7 >60.0 mL/min/1.73   BNP    Collection Time: 04/20/22  1:42 PM    Specimen: Blood   Result Value Ref Range    proBNP 580.7 (H)  0.0 - 450.0 pg/mL   Troponin    Collection Time: 04/20/22  1:42 PM    Specimen: Blood   Result Value Ref Range    Troponin T <0.010 0.000 - 0.030 ng/mL   Green Top (Gel)    Collection Time: 04/20/22  1:42 PM   Result Value Ref Range    Extra Tube Hold for add-ons.    Lavender Top    Collection Time: 04/20/22  1:42 PM   Result Value Ref Range    Extra Tube hold for add-on    Gold Top - SST    Collection Time: 04/20/22  1:42 PM   Result Value Ref Range    Extra Tube Hold for add-ons.    Gray Top    Collection Time: 04/20/22  1:42 PM   Result Value Ref Range    Extra Tube Hold for add-ons.    Light Blue Top    Collection Time: 04/20/22  1:42 PM   Result Value Ref Range    Extra Tube hold for add-on    CBC Auto Differential    Collection Time: 04/20/22  1:42 PM    Specimen: Blood   Result Value Ref Range    WBC 11.91 (H) 3.40 - 10.80 10*3/mm3    RBC 4.34 3.77 - 5.28 10*6/mm3    Hemoglobin 10.7 (L) 12.0 - 15.9 g/dL    Hematocrit 35.6 34.0 - 46.6 %    MCV 82.0 79.0 - 97.0 fL    MCH 24.7 (L) 26.6 - 33.0 pg    MCHC 30.1 (L) 31.5 - 35.7 g/dL    RDW 15.8 (H) 12.3 - 15.4 %    RDW-SD 47.0 37.0 - 54.0 fl    MPV 10.9 6.0 - 12.0 fL    Platelets 456 (H) 140 - 450 10*3/mm3    Neutrophil % 61.3 42.7 - 76.0 %    Lymphocyte % 32.0 19.6 - 45.3 %    Monocyte % 5.1 5.0 - 12.0 %    Eosinophil % 0.6 0.3 - 6.2 %    Basophil % 0.6 0.0 - 1.5 %    Immature Grans % 0.4 0.0 - 0.5 %    Neutrophils, Absolute 7.30 (H) 1.70 - 7.00 10*3/mm3    Lymphocytes, Absolute 3.81 (H) 0.70 - 3.10 10*3/mm3    Monocytes, Absolute 0.61 0.10 - 0.90 10*3/mm3    Eosinophils, Absolute 0.07 0.00 - 0.40 10*3/mm3    Basophils, Absolute 0.07 0.00 - 0.20 10*3/mm3    Immature Grans, Absolute 0.05 0.00 - 0.05 10*3/mm3    nRBC 0.0 0.0 - 0.2 /100 WBC   TSH    Collection Time: 04/20/22  1:42 PM    Specimen: Blood   Result Value Ref Range    TSH 1.610 0.270 - 4.200 uIU/mL   T4, Free    Collection Time: 04/20/22  1:42 PM    Specimen: Blood   Result Value Ref Range    Free T4 1.45  0.93 - 1.70 ng/dL   Cortisol    Collection Time: 04/20/22  1:42 PM    Specimen: Blood   Result Value Ref Range    Cortisol 13.59   mcg/dL   Osmolality, Serum    Collection Time: 04/20/22  1:42 PM    Specimen: Blood   Result Value Ref Range    Osmolality 293 275 - 295 mOsm/kg   Hemoglobin A1c    Collection Time: 04/20/22  1:42 PM    Specimen: Blood   Result Value Ref Range    Hemoglobin A1C 6.70 (H) 4.80 - 5.60 %   Lactic Acid, Plasma    Collection Time: 04/20/22  1:42 PM    Specimen: Blood   Result Value Ref Range    Lactate 1.3 0.5 - 2.0 mmol/L   Procalcitonin    Collection Time: 04/20/22  1:42 PM    Specimen: Blood   Result Value Ref Range    Procalcitonin 0.02 0.00 - 0.25 ng/mL   Ethanol    Collection Time: 04/20/22  1:42 PM    Specimen: Blood   Result Value Ref Range    Ethanol <10 0 - 10 mg/dL   POCT pregnancy, urine    Collection Time: 04/20/22  5:15 PM    Specimen: Urine   Result Value Ref Range    HCG, Urine, QL Negative Negative    Lot Number [id number]     Internal Positive Control Positive Positive, Passed    Internal Negative Control Negative Negative, Passed    Expiration Date 03/31/2023    Urine Drug Screen - Urine, Clean Catch    Collection Time: 04/20/22  5:41 PM    Specimen: Urine, Clean Catch   Result Value Ref Range    THC, Screen, Urine Negative Negative    Phencyclidine (PCP), Urine Negative Negative    Cocaine Screen, Urine Negative Negative    Methamphetamine, Ur Negative Negative    Opiate Screen Negative Negative    Amphetamine Screen, Urine Negative Negative    Benzodiazepine Screen, Urine Negative Negative    Tricyclic Antidepressants Screen Negative Negative    Methadone Screen, Urine Negative Negative    Barbiturates Screen, Urine Negative Negative    Oxycodone Screen, Urine Negative Negative    Propoxyphene Screen Negative Negative    Buprenorphine, Screen, Urine Negative Negative   Osmolality, Urine - Urine, Clean Catch    Collection Time: 04/20/22  5:41 PM    Specimen: Urine, Clean  Catch   Result Value Ref Range    Osmolality, Urine 330 300 - 1,100 mOsm/kg   Sodium, Urine, Random - Urine, Clean Catch    Collection Time: 04/20/22  5:41 PM    Specimen: Urine, Clean Catch   Result Value Ref Range    Sodium, Urine 131 mmol/L   Creatinine, Urine, Random - Urine, Clean Catch    Collection Time: 04/20/22  5:41 PM    Specimen: Urine, Clean Catch   Result Value Ref Range    Creatinine, Urine 13.8 mg/dL   COVID-19 and FLU A/B PCR - Swab, Nasopharynx    Collection Time: 04/20/22  6:36 PM    Specimen: Nasopharynx; Swab   Result Value Ref Range    COVID19 Not Detected Not Detected - Ref. Range    Influenza A PCR Not Detected Not Detected    Influenza B PCR Not Detected Not Detected   Urinalysis With Culture If Indicated - Urine, Clean Catch    Collection Time: 04/20/22  6:43 PM    Specimen: Urine, Clean Catch   Result Value Ref Range    Color, UA Yellow Yellow, Straw    Appearance, UA Clear Clear    pH, UA 7.5 5.0 - 8.0    Specific Gravity, UA 1.008 1.001 - 1.030    Glucose, UA Negative Negative    Ketones, UA Negative Negative    Bilirubin, UA Negative Negative    Blood, UA Negative Negative    Protein, UA Negative Negative    Leuk Esterase, UA Negative Negative    Nitrite, UA Negative Negative    Urobilinogen, UA 0.2 E.U./dL 0.2 - 1.0 E.U./dL   POC Glucose Once    Collection Time: 04/20/22  7:55 PM    Specimen: Blood   Result Value Ref Range    Glucose 206 (H) 70 - 130 mg/dL   Magnesium    Collection Time: 04/21/22  6:27 AM    Specimen: Blood   Result Value Ref Range    Magnesium 1.7 1.6 - 2.6 mg/dL   Phosphorus    Collection Time: 04/21/22  6:27 AM    Specimen: Blood   Result Value Ref Range    Phosphorus 3.7 2.5 - 4.5 mg/dL   Comprehensive Metabolic Panel    Collection Time: 04/21/22  6:27 AM    Specimen: Blood   Result Value Ref Range    Glucose 159 (H) 65 - 99 mg/dL    BUN 10 6 - 20 mg/dL    Creatinine 0.67 0.57 - 1.00 mg/dL    Sodium 138 136 - 145 mmol/L    Potassium 4.2 3.5 - 5.2 mmol/L    Chloride  102 98 - 107 mmol/L    CO2 25.0 22.0 - 29.0 mmol/L    Calcium 8.5 (L) 8.6 - 10.5 mg/dL    Total Protein 6.6 6.0 - 8.5 g/dL    Albumin 3.60 3.50 - 5.20 g/dL    ALT (SGPT) 19 1 - 33 U/L    AST (SGOT) 21 1 - 32 U/L    Alkaline Phosphatase 75 39 - 117 U/L    Total Bilirubin 0.5 0.0 - 1.2 mg/dL    Globulin 3.0 gm/dL    A/G Ratio 1.2 g/dL    BUN/Creatinine Ratio 14.9 7.0 - 25.0    Anion Gap 11.0 5.0 - 15.0 mmol/L    eGFR 114.9 >60.0 mL/min/1.73   CBC Auto Differential    Collection Time: 04/21/22  6:27 AM    Specimen: Blood   Result Value Ref Range    WBC 10.61 3.40 - 10.80 10*3/mm3    RBC 3.98 3.77 - 5.28 10*6/mm3    Hemoglobin 9.9 (L) 12.0 - 15.9 g/dL    Hematocrit 31.9 (L) 34.0 - 46.6 %    MCV 80.2 79.0 - 97.0 fL    MCH 24.9 (L) 26.6 - 33.0 pg    MCHC 31.0 (L) 31.5 - 35.7 g/dL    RDW 16.0 (H) 12.3 - 15.4 %    RDW-SD 46.0 37.0 - 54.0 fl    MPV 11.2 6.0 - 12.0 fL    Platelets 367 140 - 450 10*3/mm3    Neutrophil % 68.7 42.7 - 76.0 %    Lymphocyte % 23.0 19.6 - 45.3 %    Monocyte % 7.2 5.0 - 12.0 %    Eosinophil % 0.4 0.3 - 6.2 %    Basophil % 0.4 0.0 - 1.5 %    Immature Grans % 0.3 0.0 - 0.5 %    Neutrophils, Absolute 7.30 (H) 1.70 - 7.00 10*3/mm3    Lymphocytes, Absolute 2.44 0.70 - 3.10 10*3/mm3    Monocytes, Absolute 0.76 0.10 - 0.90 10*3/mm3    Eosinophils, Absolute 0.04 0.00 - 0.40 10*3/mm3    Basophils, Absolute 0.04 0.00 - 0.20 10*3/mm3    Immature Grans, Absolute 0.03 0.00 - 0.05 10*3/mm3    nRBC 0.0 0.0 - 0.2 /100 WBC   POC Glucose Once    Collection Time: 04/21/22  7:37 AM    Specimen: Blood   Result Value Ref Range    Glucose 159 (H) 70 - 130 mg/dL   POC Glucose Once    Collection Time: 04/21/22  8:05 AM    Specimen: Blood   Result Value Ref Range    Glucose 157 (H) 70 - 130 mg/dL   Calcium, Ionized    Collection Time: 04/21/22  8:40 AM    Specimen: Blood   Result Value Ref Range    Ionized Calcium 1.24 1.12 - 1.32 mmol/L   POC Glucose Once    Collection Time: 04/21/22 12:35 PM    Specimen: Blood   Result  Value Ref Range    Glucose 138 (H) 70 - 130 mg/dL   Duplex Renal Artery - Bilateral Complete CAR    Collection Time: 04/21/22 12:58 PM   Result Value Ref Range    Kid L 9.9 cm    KID L RIGHT 9.4 cm    Kidney Height Left 2.4 cm    KID H RIGHT 4.6 cm    Left kidney width 6.0 cm    KID W RIGHT 4.9 cm     CV XLRA PAM - SUP MALCOLM AO .3 cm/sec    RENAL A ORG PSV LEFT 64.2 cm/sec    RENAL A ORG PSV RIGHT 59.2 cm/sec    RENAL A ORG EDV LEFT 21.0 cm/sec    RENAL A ORG EDV RIGHT 21.0 cm/sec    RENAL A ORG RI LEFT 0.67     RENAL A ORG RI RIGHT 0.64     PROX MALCOLM A PSV LEFT 72.9 cm/sec    PROX MALCOLM A PSV RIGHT 51.6 cm/sec    PROX MALCOLM A EDV LEFT 22.7 cm/sec    PROX MALCOLM A EDV RIGHT 25.5 cm/sec    PROX MALCOLM A RI LEFT 0.69     PROX MALCOLM A RI RIGHT 0.51     MID MALCOLM A PSV LEFT 62.2 cm/sec    MID MALCOLM A PSV RIGHT 91.7 cm/sec    MID MALCOLM A EDV LEFT 19.7 cm/sec    MID MALCOLM A EDV RIGHT 41.2 cm/sec    MID MALCOLM A RI LEFT 0.68     MID MALCOLM A RI RIGHT 0.55     DIST MALCOLM A PSV LEFT 54.0 cm/sec    DIST MALCOLM A PSV RIGHT 43.7 cm/sec    DIST MALCOLM A EDV LEFT 15.4 cm/sec    DIST MALCOLM A EDV RIGHT 13.4 cm/sec    DIST MALCOLM A RI LEFT 0.71     DIST MALCOLM A RI RIGHT 0.69     HILAR A PSV RIGHT 27.2 cm/sec    HILAR A EDV RIGHT 9.8 cm/sec    HILAR A RI RIGHT 0.64     SUP SEG PSV LEFT 32.2 cm/sec    SUP SEG PSV RIGHT 45.5 cm/sec    SUP SEG EDV LEFT 12.4 cm/sec    BH CV XLRA PAM - SUP SEG RI LEFT 0.61      CV XLRA PAM - SUP ARC PSV LEFT 13.7 cm/sec    SUP ARC PSV RIGHT 19.1 cm/sec    SUP ARC EDV LEFT 6.1 cm/sec    SUP ARC EDV RIGHT 9.0 cm/sec    SUP ARC RI LEFT 0.55     SUP ARC RI RIGHT 0.53     INF SEG PSV LEFT 27.9 cm/sec    INF SEG PSV RIGHT 21.8 cm/sec    INF SEG EDV LEFT 9.5 cm/sec    INF SEG EDV RIGHT 9.8 cm/sec    INF SEG RI LEFT 0.66     INF SEG RI RIGHT 0.55     INF ARC PSV LEFT 16.1 cm/sec    INF ARC PSV RIGHT 19.6 cm/sec    INF ARC EDV LEFT 7.3 cm/sec    INF ARC EDV RIGHT 6.6 cm/sec    BH CV XLRA PAM INF ARC RI LEFT 0.55     INF ARC RI RIGHT 0.66      BH CV VAS BP RIGHT /109 mmHg    RAR RIGHT 0.8     Right renal upper parenchyma max 19 cm/s    Right renal upper parenchyma min 9 cm/s    Left renal upper parenchyma max 16 cm/s    Left renal upper parenchyma min 7 cm/s    Aortic Mid  cm/s    Hilum Right PSV 27 cm/s    Hilum Right EDV 10 cm/s    Hilum Left PSV 80 cm/s    Hilum Left EDV 9 cm/s    RAR LEFT 0.6     Right renal upper parenchyma RI 0.53     Left renal upper parenchyma RI 0.56    Pregnancy, Urine - Urine, Clean Catch    Collection Time: 04/21/22  1:04 PM    Specimen: Urine, Clean Catch   Result Value Ref Range    HCG, Urine QL Negative Negative   Adult Transthoracic Echo Complete W/ Cont if Necessary Per Protocol    Collection Time: 04/21/22  3:23 PM   Result Value Ref Range    BSA 2.2 m^2    IVSd 1.4 cm    LVIDd 4.8 cm    LVIDs 3.6 cm    LVPWd 1.3 cm    IVS/LVPW 1.1     FS 25.5 %    EDV(Teich) 108.6 ml    ESV(Teich) 54.2 ml    EF(Teich) 50.1 %    EDV(cubed) 112.1 ml    ESV(cubed) 46.4 ml    EF(cubed) 58.6 %    LV mass(C)d 263.7 grams    LV mass(C)dI 122.0 grams/m^2    SV(Teich) 54.4 ml    SI(Teich) 25.2 ml/m^2    SV(cubed) 65.6 ml    SI(cubed) 30.4 ml/m^2    MV Diam 3.1 cm    Ao root diam 2.6 cm    Ao root area 5.3 cm^2    asc Aorta Diam 2.7 cm    LVOT diam 2.2 cm    LVOT area 3.9 cm^2    LVOT area(traced) 3.8 cm^2    LAd major 5.8 cm    LVLd ap4 7.9 cm    EDV(MOD-sp4) 77.0 ml    LVLs ap4 7.1 cm    ESV(MOD-sp4) 34.0 ml    EF(MOD-sp4) 55.8 %    LVLd ap2 8.0 cm    EDV(MOD-sp2) 90.0 ml    LVLs ap2 7.2 cm    ESV(MOD-sp2) 43.0 ml    EF(MOD-sp2) 52.2 %    LA volume 62.0 ml    EF(MOD-bp) 55.0 %    SV(MOD-sp4) 43.0 ml    SI(MOD-sp4) 19.9 ml/m^2    SV(MOD-sp2) 47.0 ml    SI(MOD-sp2) 21.7 ml/m^2    Ao root area (BSA corrected) 1.2     LV Guevara Vol (BSA corrected) 35.6 ml/m^2    LV Sys Vol (BSA corrected) 15.7 ml/m^2    LA Volume Index 28.7 ml/m^2    MV E max bandar 110.8 cm/sec    LV IVRT 0.07 sec    MV V2 max 139.0 cm/sec    MV max PG 7.7 mmHg    MV V2  mean 92.3 cm/sec    MV mean PG 3.7 mmHg    MV V2 VTI 19.3 cm    MV area (1 diam) 7.4 cm^2    MVA(VTI) 2.9 cm^2    MV Flow area(1diam) 7.4 cm^2    MV dec time 0.13 sec    Ao pk garrett 139.0 cm/sec    Ao max PG 7.7 mmHg    Ao max PG (full) 4.9 mmHg    Ao V2 mean 85.5 cm/sec    Ao mean PG 3.5 mmHg    Ao mean PG (full) 1.8 mmHg    Ao V2 VTI 22.0 cm    KATHLEEN(I,A) 2.6 cm^2    KATHLEEN(I,D) 2.6 cm^2    KATHLEEN(V,A) 2.4 cm^2    KATHLEEN(V,D) 2.4 cm^2    LV V1 max PG 2.8 mmHg    LV V1 mean PG 1.7 mmHg    LV V1 max 83.9 cm/sec    LV V1 mean 60.7 cm/sec    LV V1 VTI 14.5 cm    MR max garrett 558.2 cm/sec    MR max .0 mmHg    MR mean garrett 458.2 cm/sec    MR mean PG 90.1 mmHg    MR .7 cm    MR PISA 2.9 cm^2    MR flow rate 108.8 cm^3/sec    MR ERO 0.19 cm^2    MR volume 29.6 ml    MR PISA radius 0.68 cm    MR alias garrett 37.1 cm/sec    SV(MV 1 diam) 142.4 ml    SI(MV 1 diam) 65.9 ml/m^2    SV(Ao) 115.5 ml    SI(Ao) 53.4 ml/m^2    SV(LVOT) 56.8 ml    SI(LVOT) 26.3 ml/m^2    PA V2 max 97.4 cm/sec    PA max PG 3.8 mmHg    PA acc slope 695.5 cm/sec^2    PA acc time 0.1 sec    TR max garrett 282.4 cm/sec    TR max PG 32.0 mmHg    PA pr(Accel) 36.2 mmHg    BH CV ECHO PAM - RF(MV,AO)(1 DIAM) 0.19     RF(MV,LVOT)(1diam) 0.6     Lat E/e'  9.3     Med E/e' 12.2     Lat Peak E' Garrett 11.8 cm/sec    Med Peak E' Garrett 9.0 cm/sec     CV ECHO PAM - BZI_BMI 46.1 kilograms/m^2     CV ECHO PAM - BSA(HAYCOCK) 2.4 m^2     CV ECHO PAM - BZI_METRIC_WEIGHT 117.9 kg     CV ECHO PAM - BZI_METRIC_HEIGHT 160.0 cm    Avg E/e' ratio 10.65     Target HR (85%) 155 bpm    Max. Pred. HR (100%) 182 bpm     CV VAS BP RIGHT /111 mmHg    RV Base 2.80 cm    RV Length 8.00 cm    RV Mid 2.00 cm    MV regurgitant fraction 21 %    MV vena contracta 0.54 cm    IVRT 70.0 msec    PISA ALIASING GARRETT 3.70 m/s    Radius 0.7 cm    PISA MR EROA 0.20 cm2    RAP systole 3 mmHg    RVSP(TR) 35 mmHg    LA dimension 3.9 cm   POC Glucose Once    Collection Time: 04/21/22  3:46 PM     Specimen: Blood   Result Value Ref Range    Glucose 255 (H) 70 - 130 mg/dL   POC Glucose Once    Collection Time: 04/21/22  5:34 PM    Specimen: Blood   Result Value Ref Range    Glucose 269 (H) 70 - 130 mg/dL   POC Glucose Once    Collection Time: 04/21/22  8:12 PM    Specimen: Blood   Result Value Ref Range    Glucose 106 70 - 130 mg/dL   Magnesium    Collection Time: 04/22/22  5:52 AM    Specimen: Blood   Result Value Ref Range    Magnesium 2.1 1.6 - 2.6 mg/dL   CBC (No Diff)    Collection Time: 04/22/22  5:52 AM    Specimen: Blood   Result Value Ref Range    WBC 8.81 3.40 - 10.80 10*3/mm3    RBC 4.11 3.77 - 5.28 10*6/mm3    Hemoglobin 10.3 (L) 12.0 - 15.9 g/dL    Hematocrit 33.7 (L) 34.0 - 46.6 %    MCV 82.0 79.0 - 97.0 fL    MCH 25.1 (L) 26.6 - 33.0 pg    MCHC 30.6 (L) 31.5 - 35.7 g/dL    RDW 15.9 (H) 12.3 - 15.4 %    RDW-SD 47.8 37.0 - 54.0 fl    MPV 10.7 6.0 - 12.0 fL    Platelets 372 140 - 450 10*3/mm3   Phosphorus    Collection Time: 04/22/22  5:52 AM    Specimen: Blood   Result Value Ref Range    Phosphorus 4.3 2.5 - 4.5 mg/dL   Basic Metabolic Panel    Collection Time: 04/22/22  5:52 AM    Specimen: Blood   Result Value Ref Range    Glucose 121 (H) 65 - 99 mg/dL    BUN 10 6 - 20 mg/dL    Creatinine 0.72 0.57 - 1.00 mg/dL    Sodium 136 136 - 145 mmol/L    Potassium 4.7 3.5 - 5.2 mmol/L    Chloride 101 98 - 107 mmol/L    CO2 26.0 22.0 - 29.0 mmol/L    Calcium 8.8 8.6 - 10.5 mg/dL    BUN/Creatinine Ratio 13.9 7.0 - 25.0    Anion Gap 9.0 5.0 - 15.0 mmol/L    eGFR 109.9 >60.0 mL/min/1.73   POC Glucose Once    Collection Time: 04/22/22  7:42 AM    Specimen: Blood   Result Value Ref Range    Glucose 131 (H) 70 - 130 mg/dL   POC Glucose Once    Collection Time: 04/22/22 11:30 AM    Specimen: Blood   Result Value Ref Range    Glucose 195 (H) 70 - 130 mg/dL     Procedures    Medication Review: Medications reviewed and noted    Patient wellness counseling  Exercise: ***  Diet: ***  Screening:***  Social History      Socioeconomic History   • Marital status: Single   Tobacco Use   • Smoking status: Never Smoker   • Smokeless tobacco: Never Used   Vaping Use   • Vaping Use: Never used   Substance and Sexual Activity   • Alcohol use: Yes     Alcohol/week: 2.0 standard drinks     Types: 2 Glasses of wine per week   • Drug use: No   • Sexual activity: Yes     Partners: Female, Male     Birth control/protection: Condom        Assessment/Plan:    Problem List Items Addressed This Visit    None          There are no Patient Instructions on file for this visit.     Plan of care reviewed with patient at the conclusion of today's visit. Education was provided regarding diagnosis, management, and any prescribed or recommended OTC medications.Patient verbalizes understanding of and agreement with management plan.         Bravo Palmer MD      Note: Part of this note may be an electronic transcription/translation of spoken language to printed text using the Dragon Dictation system.      Answers for HPI/ROS submitted by the patient on 4/20/2022  What is the primary reason for your visit?: Shortness of Breath  Chronicity: new  Onset: 1 to 4 weeks ago  Frequency: daily  Progression since onset: waxing and waning  Episode duration: 1 hours  claudication: No  coryza: No  headaches: No  hemoptysis: No  leg pain: No  orthopnea: Yes  PND: No  sputum production: No  syncope: No  Aggravating factors: any activity

## 2022-04-25 NOTE — OUTREACH NOTE
Call Center TCM Note    Flowsheet Row Responses   [facility] patient discharged from? [place]   Does the patient have one of the following disease processes/diagnoses(primary or secondary)? Other   TCM attempt successful? No   Unsuccessful attempts Attempt 2          Isha Tse LPN    4/25/2022, 11:14 EDT

## 2022-04-26 ENCOUNTER — TRANSITIONAL CARE MANAGEMENT TELEPHONE ENCOUNTER (OUTPATIENT)
Dept: CALL CENTER | Facility: HOSPITAL | Age: 37
End: 2022-04-26

## 2022-04-28 ENCOUNTER — TELEPHONE (OUTPATIENT)
Dept: INTERNAL MEDICINE | Facility: CLINIC | Age: 37
End: 2022-04-28

## 2022-04-28 DIAGNOSIS — E11.9 TYPE 2 DIABETES MELLITUS WITHOUT COMPLICATION, WITHOUT LONG-TERM CURRENT USE OF INSULIN: ICD-10-CM

## 2022-04-29 ENCOUNTER — TELEPHONE (OUTPATIENT)
Dept: INTERNAL MEDICINE | Facility: CLINIC | Age: 37
End: 2022-04-29

## 2022-05-03 ENCOUNTER — READMISSION MANAGEMENT (OUTPATIENT)
Dept: CALL CENTER | Facility: HOSPITAL | Age: 37
End: 2022-05-03

## 2022-05-03 NOTE — OUTREACH NOTE
Medical Week 2 Survey    Flowsheet Row Responses   [facility] patient discharged from? [place]   Does the patient have one of the following disease processes/diagnoses(primary or secondary)? Other   Week 2 attempt successful? Yes   Call start time 1402   Discharge diagnosis Hypertensive emergency   Call end time 1407   Comments regarding PCP 4/25/22   Has the patient kept scheduled appointments due by today? Yes   What is the patient's perception of their health status since discharge? Improving   Week 2 Call Completed? Yes          GIRMA YI - Registered Nurse

## 2022-05-11 ENCOUNTER — READMISSION MANAGEMENT (OUTPATIENT)
Dept: CALL CENTER | Facility: HOSPITAL | Age: 37
End: 2022-05-11

## 2022-05-11 NOTE — OUTREACH NOTE
Medical Week 3 Survey    Flowsheet Row Responses   [facility] patient discharged from? [place]   Does the patient have one of the following disease processes/diagnoses(primary or secondary)? Other   Week 3 attempt successful? Yes   Call start time 1552   Call end time 1554   Discharge diagnosis Hypertensive emergency   Meds reviewed with patient/caregiver? Yes   Is the patient having any side effects they believe may be caused by any medication additions or changes? No   Does the patient have all medications ordered at discharge? N/A   Is the patient taking all medications as directed (includes completed medication regime)? Yes   Comments regarding appointments Will have 2nd PCP follow up at the end of May   Does the patient have a primary care provider?  Yes   Comments regarding PCP 4/25/22   Does the patient have an appointment with their PCP within 7 days of discharge? Yes   Has the patient kept scheduled appointments due by today? Yes   Has home health visited the patient within 72 hours of discharge? N/A   Psychosocial issues? No   Did the patient receive a copy of their discharge instructions? Yes   Nursing interventions Reviewed instructions with patient   What is the patient's perception of their health status since discharge? Improving   Is the patient/caregiver able to teach back signs and symptoms related to disease process for when to call PCP? Yes   Is the patient/caregiver able to teach back signs and symptoms related to disease process for when to call 911? Yes   Is the patient/caregiver able to teach back the hierarchy of who to call/visit for symptoms/problems? PCP, Specialist, Home health nurse, Urgent Care, ED, 911 Yes   If the patient is a current smoker, are they able to teach back resources for cessation? Not a smoker   Week 3 Call Completed? Yes   Wrap up additional comments Encouraged patient to routinely check BP and keep a diary of readings.          GALEN ELIZABETH - Registered Nurse

## 2022-05-26 ENCOUNTER — TELEPHONE (OUTPATIENT)
Dept: INTERNAL MEDICINE | Facility: CLINIC | Age: 37
End: 2022-05-26

## 2022-05-26 ENCOUNTER — OFFICE VISIT (OUTPATIENT)
Dept: INTERNAL MEDICINE | Facility: CLINIC | Age: 37
End: 2022-05-26

## 2022-05-26 DIAGNOSIS — M25.552 LEFT HIP PAIN: ICD-10-CM

## 2022-05-26 DIAGNOSIS — E11.9 TYPE 2 DIABETES MELLITUS WITHOUT COMPLICATION, WITHOUT LONG-TERM CURRENT USE OF INSULIN: ICD-10-CM

## 2022-05-26 DIAGNOSIS — M25.472 ANKLE EDEMA, BILATERAL: ICD-10-CM

## 2022-05-26 DIAGNOSIS — M25.471 ANKLE EDEMA, BILATERAL: ICD-10-CM

## 2022-05-26 DIAGNOSIS — I10 ESSENTIAL HYPERTENSION: Primary | ICD-10-CM

## 2022-05-26 DIAGNOSIS — R53.81 PHYSICAL DECONDITIONING: ICD-10-CM

## 2022-05-26 DIAGNOSIS — E66.01 MORBID OBESITY WITH BODY MASS INDEX (BMI) OF 40.0 TO 49.9: Chronic | ICD-10-CM

## 2022-05-26 DIAGNOSIS — J30.9 ALLERGIC RHINITIS, UNSPECIFIED SEASONALITY, UNSPECIFIED TRIGGER: ICD-10-CM

## 2022-05-26 PROBLEM — D50.9 ANEMIA, IRON DEFICIENCY: Status: ACTIVE | Noted: 2022-05-26

## 2022-05-26 PROCEDURE — 99214 OFFICE O/P EST MOD 30 MIN: CPT | Performed by: PHYSICIAN ASSISTANT

## 2022-05-26 RX ORDER — BUDESONIDE AND FORMOTEROL FUMARATE DIHYDRATE 160; 4.5 UG/1; UG/1
2 AEROSOL RESPIRATORY (INHALATION)
Qty: 10.2 G | Refills: 3 | Status: SHIPPED | OUTPATIENT
Start: 2022-05-26 | End: 2022-10-02

## 2022-05-26 RX ORDER — CHLORTHALIDONE 50 MG/1
50 TABLET ORAL DAILY
Qty: 30 TABLET | Refills: 0 | Status: SHIPPED | OUTPATIENT
Start: 2022-05-26 | End: 2022-06-17

## 2022-05-26 RX ORDER — FUROSEMIDE 20 MG/1
20 TABLET ORAL 2 TIMES DAILY
Qty: 30 TABLET | Refills: 0 | Status: SHIPPED | OUTPATIENT
Start: 2022-05-26 | End: 2022-10-25 | Stop reason: ALTCHOICE

## 2022-05-26 RX ORDER — FLUTICASONE PROPIONATE 50 MCG
2 SPRAY, SUSPENSION (ML) NASAL DAILY
Qty: 9.9 ML | Refills: 5 | Status: SHIPPED | OUTPATIENT
Start: 2022-05-26 | End: 2022-12-01

## 2022-05-26 NOTE — PROGRESS NOTES
[facility] Internal Medicine    Deanna Putnam  1985   [id number]      Patient Care Team:  Roberta Rodriguez PA-C as PCP - General (Internal Medicine)  Leann Alejo DO (Inactive) as Consulting Physician (Obstetrics and Gynecology)  Jorge Alberto Arellano MD as Consulting Physician (Obstetrics and Gynecology)  Mentzer, Elizabeth Kathleen, PA (Physician Assistant)  Nolan Biswas MD as Consulting Physician (Gastroenterology)    Chief Complaint::   Chief Complaint   Patient presents with   • Hypertension     4 wk. F/u            HPI  Hypertension  The patient was last seen on 04/25/2022 when she was discharged from the hospital. Her blood pressure was at 156/88 mmHg today, which is more elevated then when she was last seen. It was at 142/90 mmHg upon recheck.     Edema  She states she has been experiencing swelling in her lower extremities. Her legs to feel tight to her. She notes that they have been weaker before. Her shoes do feel tighter. She has been on Lasix in 2019, but she is unable to remember it. She has been elevating her feet at night. She denies any chest pain or shortness of breath. She only gets headaches if she takes her medications too close together.     Weight  The patient reports that she has not been eating well. She currently has too many things in her house, and she so she has been eating out constantly since she has no space to put groceries and cook food. She also attributes her unhealthy habit to her depression. She states that she has always had weight issues since elementary school. Her daughter also have weight issues, and she was bullied for her weight when she was 1 years old. She does have support from her father, but he does not eat healthy. She has not received a call from bariatrics.    Depression  The patient states that she as no energy due to her being depressed. She is okay at work, but as soon as she sits in her house, she gets overwhelmed and loses all  energy. She gets anxious when she starts to work on her house, and she then is unable to push herself to do it. She has one of her friends who is going to find time to help her clean her house. She does know that once she starts the process, it is easier for her to keep going. Her house first became messy because of her depression, but now because of her messy home, she is more depressed. She would like to try a medication.     Social history  The patient now works for [employer], and she will be working for 3 weeks this summer. She will be work in the evening from 7:25 pm to 11:45 pm. She still has been waking up early to take her 6-year-old daughter to school. She is planning to go to [employer] in 07/2022.     Hip pain  She states she has been experiencing left hip pain. It can be severe at times. It is difficult for her to get up in the morning and walk. Sitting certain ways can trigger the pain. She tries not to lay on her left side and not to put pressure on it.      She does need her nasal spray and Symbicort inhaler refilled.        Patient Active Problem List   Diagnosis   • Intramural and subserous leiomyoma of uterus   • Essential hypertension   • Morbid obesity   • Recurrent major depressive disorder, in full remission (HCC)   • Mild intermittent asthma without complication   • PCOS (polycystic ovarian syndrome)   • T2DM    • Arthralgia of both knees   • Screening, lipid   • Bloating   • Menorrhagia with regular cycle   • Hip pain, bilateral   • Vitamin D deficiency   • Hypertensive emergency   • Hyponatremia   • Medically noncompliant   • Anemia, iron deficiency   • Diabetes (HCC)   • Allergic rhinitis   • Left hip pain   • Physical deconditioning   • Ankle edema, bilateral        Past Medical History:   Diagnosis Date   • Allergic    • Anemia    • Asthma    • Depression    • Diabetes mellitus (HCC)    • Hypertension    • Obesity    • Osteoarthritis of back    • Prediabetes        Past  Surgical History:   Procedure Laterality Date   • CHOLECYSTECTOMY  2018   • DIAGNOSTIC LAPAROSCOPY  12/13/2018   • ENDOMETRIAL ABLATION  2018    Did not get endometriosis out   • EYE SURGERY  2015    eyelid lift   • GALLBLADDER SURGERY  04/2018   • TONSILLECTOMY     • WISDOM TOOTH EXTRACTION         Family History   Problem Relation Age of Onset   • Endometrial cancer Mother 59   • Diabetes Mother    • Hypertension Mother    • Cancer Mother    • Hyperlipidemia Mother    • Miscarriages / Stillbirths Mother    • Vision loss Mother    • Breast cancer Maternal Grandmother 80   • Colon cancer Maternal Grandmother 90   • Arthritis Maternal Grandmother    • Birth defects Maternal Grandmother    • Diabetes Maternal Grandmother    • Cancer Maternal Grandmother    • Hyperlipidemia Maternal Grandmother    • Vision loss Maternal Grandmother    • Breast cancer Maternal Aunt 60   • Cancer Maternal Aunt    • Diabetes Father    • Hypertension Father    • Hyperlipidemia Father    • Diabetes Paternal Grandmother    • Hypertension Paternal Grandmother    • Hyperlipidemia Paternal Grandmother    • Hypertension Paternal Grandfather    • Diabetes Paternal Grandfather    • Hyperlipidemia Paternal Grandfather    • Uterine cancer Neg Hx    • Ovarian cancer Neg Hx        Social History     Socioeconomic History   • Marital status: Single   Tobacco Use   • Smoking status: Never Smoker   • Smokeless tobacco: Never Used   Vaping Use   • Vaping Use: Never used   Substance and Sexual Activity   • Alcohol use: Not Currently     Comment: rarely   • Drug use: No   • Sexual activity: Yes     Partners: Female, Male     Birth control/protection: Condom       Allergies   Allergen Reactions   • Eggs Or Egg-Derived Products Nausea And Vomiting   • Mold Extract [Trichophyton] Unknown (See Comments)     unknown       Review of Systems   Constitutional: Positive for unexpected weight gain. Negative for activity change, appetite change, diaphoresis, fatigue  "and unexpected weight loss.   HENT: Negative for hearing loss.    Eyes: Negative for blurred vision, double vision and visual disturbance.   Respiratory: Negative for chest tightness and shortness of breath.    Cardiovascular: Negative for chest pain, palpitations and leg swelling.   Gastrointestinal: Negative for abdominal pain, blood in stool, GERD and indigestion.   Endocrine: Negative for cold intolerance and heat intolerance.   Genitourinary: Negative for dysuria and hematuria.   Musculoskeletal: Negative for arthralgias and myalgias.   Skin: Negative for skin lesions.   Neurological: Negative for tremors, seizures, syncope, speech difficulty, weakness, headache, memory problem and confusion.   Hematological: Does not bruise/bleed easily.   Psychiatric/Behavioral: Negative for sleep disturbance and depressed mood. The patient is not nervous/anxious.         Vital Signs  Vitals:    05/26/22 1528 05/27/22 1353   BP: 156/88 148/88   BP Location: Left arm    Patient Position: Sitting    Cuff Size: Large Adult    Pulse: 96    Temp: 98.4 °F (36.9 °C)    TempSrc: Temporal    Weight: 118 kg (261 lb 3.2 oz)    Height: 160 cm (62.99\")    PainSc: 0-No pain      Body mass index is 46.28 kg/m².  Class 3 Severe Obesity (BMI >=40). Obesity-related health conditions include the following: hypertension, diabetes mellitus and dyslipidemias. Obesity is worsening. BMI is is above average; BMI management plan is completed. We discussed low calorie, low carb based diet program, portion control and increasing exercise.         Current Outpatient Medications:   •  amLODIPine (NORVASC) 10 MG tablet, Take 1 tablet by mouth Daily., Disp: 90 tablet, Rfl: 1  •  budesonide-formoterol (SYMBICORT) 160-4.5 MCG/ACT inhaler, Inhale 2 puffs 2 (Two) Times a Day., Disp: 10.2 g, Rfl: 3  •  cetirizine (zyrTEC) 10 MG tablet, Take 1 tablet by mouth Daily., Disp: 90 tablet, Rfl: 1  •  fluticasone (FLONASE) 50 MCG/ACT nasal spray, 2 sprays into the " nostril(s) as directed by provider Daily. Wasn't sent in, Disp: 9.9 mL, Rfl: 5  •  lisinopril (PRINIVIL,ZESTRIL) 40 MG tablet, Take 1 tablet by mouth Daily., Disp: 30 tablet, Rfl: 3  •  chlorthalidone (HYGROTEN) 50 MG tablet, Take 1 tablet by mouth Daily., Disp: 30 tablet, Rfl: 0  •  Dulaglutide 0.75 MG/0.5ML solution pen-injector, Inject 0.75 mg under the skin into the appropriate area as directed 1 (One) Time Per Week. (Patient taking differently: Inject 0.5 mL under the skin into the appropriate area as directed 1 (One) Time Per Week. Hasn't started yet), Disp: 1 pen, Rfl: 5  •  furosemide (Lasix) 20 MG tablet, Take 1 tablet by mouth 2 (Two) Times a Day., Disp: 30 tablet, Rfl: 0    Physical Exam  Vitals reviewed.   Constitutional:       Appearance: Normal appearance. She is well-developed. She is obese.   HENT:      Head: Normocephalic and atraumatic.      Right Ear: Hearing, tympanic membrane, ear canal and external ear normal.      Left Ear: Hearing, tympanic membrane, ear canal and external ear normal.      Nose: Congestion present.      Mouth/Throat:      Mouth: Mucous membranes are moist.      Pharynx: Oropharynx is clear. Uvula midline. No posterior oropharyngeal erythema.   Eyes:      General: Lids are normal.      Conjunctiva/sclera: Conjunctivae normal.      Pupils: Pupils are equal, round, and reactive to light.   Cardiovascular:      Rate and Rhythm: Normal rate and regular rhythm.      Heart sounds: Normal heart sounds.   Pulmonary:      Effort: Pulmonary effort is normal.      Breath sounds: Normal breath sounds.   Abdominal:      General: Bowel sounds are normal.      Palpations: Abdomen is soft.      Tenderness: There is no right CVA tenderness or left CVA tenderness.   Musculoskeletal:         General: Tenderness present.      Cervical back: Full passive range of motion without pain, normal range of motion and neck supple.      Right hip: Decreased strength.      Left hip: Tenderness present.  Decreased range of motion. Decreased strength.   Skin:     General: Skin is warm and dry.   Neurological:      Mental Status: She is alert and oriented to person, place, and time. Mental status is at baseline.      Deep Tendon Reflexes: Reflexes are normal and symmetric.   Psychiatric:         Mood and Affect: Mood normal.         Speech: Speech normal.         Behavior: Behavior normal.         Thought Content: Thought content normal.         Judgment: Judgment normal.          ACE III MINI            Results Review:    No results found for this or any previous visit (from the past 672 hour(s)).  Procedures    Medication Review: Medications reviewed and noted    Social History     Socioeconomic History   • Marital status: Single   Tobacco Use   • Smoking status: Never Smoker   • Smokeless tobacco: Never Used   Vaping Use   • Vaping Use: Never used   Substance and Sexual Activity   • Alcohol use: Not Currently     Comment: rarely   • Drug use: No   • Sexual activity: Yes     Partners: Female, Male     Birth control/protection: Condom        Assessment/Plan:    Problem List Items Addressed This Visit        Allergies and Adverse Reactions    Allergic rhinitis    Relevant Medications    fluticasone (FLONASE) 50 MCG/ACT nasal spray       Cardiac and Vasculature    Essential hypertension - Primary    Relevant Medications    lisinopril (PRINIVIL,ZESTRIL) 40 MG tablet    amLODIPine (NORVASC) 10 MG tablet    furosemide (Lasix) 20 MG tablet    chlorthalidone (HYGROTEN) 50 MG tablet       Endocrine and Metabolic    Morbid obesity (Chronic)    Overview     She has gained 9 pounds in the past month.  Difficulty with following diabetic diet.  Encouraged her to cut back on processed foods and fatty foods.  Try and cut back on sugars.  Try and eat more fresh fruits and veggies.  We will check on referral to bariatrics.           Diabetes (HCC)    Overview     She is gained 9 pounds since her last appointment 1 month.  She reports  compliance with medications.  Close follow-up warranted.             Relevant Medications    Dulaglutide 0.75 MG/0.5ML solution pen-injector       Musculoskeletal and Injuries    Left hip pain    Overview     Referral to PT for evaluation.           Relevant Orders    Ambulatory Referral to Physical Therapy Evaluate and treat    Ankle edema, bilateral    Overview     We will start chlorthalidone 50 mg tablets daily.  Follow-up in 1 month.  Try and elevate legs throughout the day.              Symptoms and Signs    Physical deconditioning    Relevant Orders    Ambulatory Referral to Physical Therapy Evaluate and treat         She will follow up in 1 month.       Plan of care reviewed with patient at the conclusion of today's visit. Education was provided regarding diagnosis, management, and any prescribed or recommended OTC medications.Patient verbalizes understanding of and agreement with management plan.       I spent 22 minutes caring for Deanna on this date of service. This time includes time spent by me in the following activities:preparing for the visit, reviewing tests, obtaining and/or reviewing a separately obtained history, performing a medically appropriate examination and/or evaluation , counseling and educating the patient/family/caregiver, ordering medications, tests, or procedures, referring and communicating with other health care professionals  and documenting information in the medical record    Roberta Rodriguez PA-C      Note: Part of this note may be an electronic transcription/translation of spoken language to printed text using the Dragon Dictation system.    Transcribed from ambient dictation for Roberta Rodriguez PA-C by Laina Perez.  05/27/22   09:44 EDT    Patient verbalized consent to the visit recording.  I have personally performed the services described in this document as transcribed by the above individual, and it is both accurate and complete.  Laina Perez  5/27/2022   09:44 EDT

## 2022-05-26 NOTE — PROGRESS NOTES
"Chief Complaint  Deanna Putnam is a 36 y.o. female presenting for Hypertension (4 wk. F/u).     History of Present Illness  ***    The following portions of the patient's history were reviewed and updated as appropriate: {history reviewed:20406::\"allergies\",\"current medications\",\"past family history\",\"past medical history\",\"past social history\",\"past surgical history\",\"problem list\"}.    Objective  /88 (BP Location: Left arm, Patient Position: Sitting, Cuff Size: Large Adult)   Pulse 96   Temp 98.4 °F (36.9 °C) (Temporal)   Ht 160 cm (62.99\")   Wt 118 kg (261 lb 3.2 oz)   BMI 46.28 kg/m²     Physical Exam    Assessment/Plan   There are no diagnoses linked to this encounter.    No follow-ups on file.    Shana Bishop MA  Family Medicine  05/26/2022    "

## 2022-05-27 VITALS
HEIGHT: 63 IN | BODY MASS INDEX: 46.28 KG/M2 | DIASTOLIC BLOOD PRESSURE: 88 MMHG | TEMPERATURE: 98.4 F | HEART RATE: 96 BPM | WEIGHT: 261.2 LBS | SYSTOLIC BLOOD PRESSURE: 148 MMHG

## 2022-05-27 PROBLEM — R53.81 PHYSICAL DECONDITIONING: Status: ACTIVE | Noted: 2022-05-27

## 2022-05-27 PROBLEM — M25.552 LEFT HIP PAIN: Status: ACTIVE | Noted: 2022-05-27

## 2022-05-27 PROBLEM — M25.472 ANKLE EDEMA, BILATERAL: Status: ACTIVE | Noted: 2022-05-27

## 2022-05-27 PROBLEM — M25.471 ANKLE EDEMA, BILATERAL: Status: ACTIVE | Noted: 2022-05-27

## 2022-05-27 PROBLEM — J30.9 ALLERGIC RHINITIS: Status: ACTIVE | Noted: 2022-05-27

## 2022-06-03 LAB
CREAT UR-MCNC: 14.2 MG/DL
METANEPHS 24H UR-MCNC: NORMAL NG/ML
METANEPHS/CREAT UR: NORMAL
NORMETANEPHRINE 24H UR-MCNC: NORMAL UG/ML

## 2022-06-06 ENCOUNTER — TELEPHONE (OUTPATIENT)
Dept: INTERNAL MEDICINE | Facility: CLINIC | Age: 37
End: 2022-06-06

## 2022-06-06 NOTE — TELEPHONE ENCOUNTER
Pharmacy requesting PA for Trulicity .75 MG pen    [rice with [facility] is [phone]      Would you like PA done?

## 2022-06-13 ENCOUNTER — TREATMENT (OUTPATIENT)
Dept: PHYSICAL THERAPY | Facility: CLINIC | Age: 37
End: 2022-06-13

## 2022-06-13 DIAGNOSIS — R53.81 PHYSICAL DECONDITIONING: ICD-10-CM

## 2022-06-13 DIAGNOSIS — M25.552 LEFT HIP PAIN: Primary | ICD-10-CM

## 2022-06-13 PROCEDURE — 97162 PT EVAL MOD COMPLEX 30 MIN: CPT | Performed by: PHYSICAL THERAPIST

## 2022-06-13 PROCEDURE — 97110 THERAPEUTIC EXERCISES: CPT | Performed by: PHYSICAL THERAPIST

## 2022-06-13 NOTE — PROGRESS NOTES
"    Physical Therapy Initial Evaluation and Plan of Care    Patient: Deanna Putnam   : 1985  Diagnosis/ICD-10 Code:  Left hip pain [M25.552]  Referring practitioner: BELL Jaramillo  Date of Initial Visit: 2022  Today's Date: 2022  Patient seen for 1 sessions         Visit Diagnoses:    ICD-10-CM ICD-9-CM   1. Left hip pain  M25.552 719.45   2. Physical deconditioning  R53.81 799.3       Subjective Questionnaire: LEFS: 30      Subjective Evaluation    History of Present Illness  Date of onset: 2020  Mechanism of injury: Pt reports insidious onset of left hip pain began a few years ago. States pain increases with prolonged sitting, especially when chair is too low. She denies previous injury to left hip. Reports she has had h/o \"sciatica\" and low back pain years ago, most radicular symptoms went down RLE; however some did go down LLE. Pain can wake her up at night, noted when she rolls over to left side; unable to sleep on L side due to lateral hip pain. She prefers to sleep in side lying position, trying to sleep on right side now. Pain increases in lateral left hip with going up stairs, prolonged standing and walking. She denies radicular symptoms past lateral left hip.     Subjective comment: Pt presents with c/o left hip pain.   Patient Occupation: Pt works with [email or web address] full time; however is out for summer. Working next week at a  for a few days. Going to [employer] in July. 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 Lives in an apartment. Single, has 5 yo daughter.  Quality of life: good    Pain  Current pain ratin  At best pain ratin  At worst pain ratin  Quality: dull ache, discomfort and pressure  Relieving factors: change in position and medications (Tylenol)  Aggravating factors: ambulation, squatting, lifting, standing, movement, sleeping and prolonged positioning  Progression: worsening    Social Support  Lives in: apartment  Lives with: young " children    Hand dominance: right    Diagnostic Tests  No diagnostic tests performed    Patient Goals  Patient goals for therapy: decreased pain, increased motion and increased strength             Objective        Special Questions  Patient is experiencing night pain and disturbed sleep.     Additional Special Questions  Denies bladder/bowel dysfunction.      Postural Observations  Seated posture: fair        Palpation   Left   Tenderness of the gluteus medius, piriformis and TFL.     Tenderness     Left Hip   Tenderness in the greater trochanter.     Neurological Testing     Sensation     Lumbar   Left   Intact: light touch    Right   Intact: light touch    Hip   Left Hip   Intact: light touch    Right Hip   Intact: light touch    Active Range of Motion     Lumbar   Flexion: 75 degrees with pain  Extension: 27 degrees   Left lateral flexion: 16 degrees   Right lateral flexion: 18 degrees   Left Hip   Flexion: 90 degrees   Extension: Left hip active extension: limited, weakness.   Abduction: 30 degrees with pain    Right Hip   Normal active range of motion    Strength/Myotome Testing     Left Hip   Planes of Motion   Flexion: 3  Extension: 3  Abduction: 3-    Right Hip   Normal muscle strength    Tests     Left Hip   Positive ANTHONY and modified Jony.   Negative scour.   SLR: Negative.     Additional Tests Details  (-) LTR for pain  (-) glute set for pain, (-) bridge for pain, just weakness/difficult to perform      Ambulation     Comments   Pt ambulated with step through gait pattern noted, trendelenburg. Antalgic gait.  Did not assess stairs          Assessment & Plan     Assessment  Impairments: abnormal gait, abnormal or restricted ROM, impaired balance, impaired physical strength, lacks appropriate home exercise program and pain with function  Functional Limitations: carrying objects, sleeping, walking, uncomfortable because of pain, sitting and standing  Assessment details: Pt is a 37 yo female referred to PT  for left hip pain who demonstrates signs and symptoms consistent with possible left trochanteric bursitis. Unable to fully r/o referral of pain from lumbar spine; however today symptoms isolated to lateral L hip. She demonstrates limited L hip AROM, TTP GT and ITB, and weakness of LLE. Pt has PMH including morbid obesity, DM, HTN, depression, and recent swelling of BLE. She has recently added a medication that has helped with BLE edema. Recommend skilled PT to improve hip mobility, spinal stabilization, decrease pain, and improve activity tolerance with daily activities. Pt has started therapy as well for depression and hopes that will also help her overall pain.   Prognosis: good    Goals  Plan Goals: Short Term Goals (2-3 weeks)  1.  Patient to be compliant with initial HEP.  2.  Patient to report improved tolerance to prolonged standing/walking with hip pain less than 3/10.   3. Patient will report decreased pain rating on VAS from  5-6/10 to 3/10 with ADLs and household activities.    Long Term Goals (4-6 weeks)  1. Patient will demonstrate independent HEP progressed for closed chain strength, proprioception, balance and agility with minimal or no compensations or exacerbations.  2. Patient to demonstrate minimal tenderness along the lateral hip/gluteal region.   3. Patient to ascend and descend one flight of stairs with one handrail with appropriate safety and minimal deficit.  4. Patient to improve LEFS score to at least 45/80      Plan  Therapy options: will be seen for skilled therapy services  Planned modality interventions: cryotherapy, dry needling, electrical stimulation/Russian stimulation and thermotherapy (hydrocollator packs)  Planned therapy interventions: abdominal trunk stabilization, body mechanics training, flexibility, functional ROM exercises, gait training, home exercise program, joint mobilization, manual therapy, neuromuscular re-education, postural training, soft tissue mobilization,  strengthening, stretching and therapeutic activities  Frequency: 2x week  Duration in visits: 12  Duration in weeks: 6  Treatment plan discussed with: patient        History # of Personal factors and/or comorbidities: MODERATE (1-2)  Examination of Body System(s): # of elements: MODERATE (3)  Clinical Presentation: STABLE   Clinical Decision Making: MODERATE      Timed:  Manual Therapy:    0     mins  89491;  Therapeutic Exercise:    9     mins  73031;     Neuromuscular Katie:    0    mins  91783;    Therapeutic Activity:     0     mins  96726;     Gait Trainin     mins  40516;     Ultrasound:     0     mins  87765;    Ionto   __0_  mins  88962;    Un-Timed:  Electrical Stimulation:    0     mins  51074 ( );  Dry Needling     0     mins self-pay  Traction  _ 0_   mins  27382  Low Eval  __0_ mins  04066  Mod Eval  _40__ mins  60176  High Eval  _0__ mins   61942    Timed Treatment:   9   mins   Total Treatment:     49   mins    PT SIGNATURE: Corinne E. Perkins, PT   [place] License: [id number]      Certification Period: 2022 thru 2022  I certify that the therapy services are furnished while this patient is under my care.  The services outlined above are required by this patient, and will be reviewed every 90 days.        Physician Signature: _______________________________________________________________________________________________________     PHYSICIAN: Roberta Rodriguez PA-C   NPI: [id number]     DATE:                                             Please sign and return via fax to .[employer] . Thank you, [facility].

## 2022-06-17 RX ORDER — CHLORTHALIDONE 50 MG/1
TABLET ORAL
Qty: 30 TABLET | Refills: 0 | Status: SHIPPED | OUTPATIENT
Start: 2022-06-17 | End: 2022-07-19

## 2022-06-22 ENCOUNTER — TREATMENT (OUTPATIENT)
Dept: PHYSICAL THERAPY | Facility: CLINIC | Age: 37
End: 2022-06-22

## 2022-06-22 DIAGNOSIS — M25.552 LEFT HIP PAIN: Primary | ICD-10-CM

## 2022-06-22 DIAGNOSIS — R53.81 PHYSICAL DECONDITIONING: ICD-10-CM

## 2022-06-22 PROCEDURE — 97140 MANUAL THERAPY 1/> REGIONS: CPT | Performed by: PHYSICAL THERAPIST

## 2022-06-22 PROCEDURE — 97110 THERAPEUTIC EXERCISES: CPT | Performed by: PHYSICAL THERAPIST

## 2022-06-22 NOTE — PROGRESS NOTES
Physical Therapy Daily Treatment Note      Patient: Deanna Putnam   : 1985  Referring practitioner: WENDY Jaramillo*  Date of Initial Visit: Type: THERAPY  Noted: 2022  Today's Date: 2022  Patient seen for 2 sessions       Visit Diagnoses:    ICD-10-CM ICD-9-CM   1. Left hip pain  M25.552 719.45   2. Physical deconditioning  R53.81 799.3       Subjective   Patient reports she has noticed lateral left hip pain with how she sits, when hip ER. States  her pain level is 0/10 upon arrival.  States she does have h/o low back pain and is concerned with total weight having impact on joint pain, meeting with bariatrics sometime.     Objective   See Exercise, Manual, and Modality Logs for complete treatment.       Assessment/Plan   Pt educated on neutral pelvic position and to avoid prolonged positioning for hip ER. Assess LLD next visit. She verbalized relief with glute sets, PPT, and tolerated spinal stabilization exercises well. She denies radicular symptoms in clinic. Educated on HEP and sent electronically. Assess response from exercises and progress as able.       Timed:         Manual Therapy:    8     mins  97994;     Therapeutic Exercise:    32     mins  15335;     Neuromuscular Katie:    0    mins  43343;    Therapeutic Activity:     0     mins  09853;     Gait Trainin     mins  88246;     Ultrasound:     0     mins  57463;    Ionto                               0    mins   50337  Self Care                       0     mins   68340  Canalith Repos    0     mins 83344      Un-Timed:  Electrical Stimulation:    0     mins  33266 ( );  Dry Needling     0     mins self-pay  Traction     0     mins 83471      Timed Treatment:   40   mins   Total Treatment:     45   mins    Corinne E. Perkins, PT  [place] License: [id number]

## 2022-06-28 ENCOUNTER — TREATMENT (OUTPATIENT)
Dept: PHYSICAL THERAPY | Facility: CLINIC | Age: 37
End: 2022-06-28

## 2022-06-28 DIAGNOSIS — M25.552 LEFT HIP PAIN: Primary | ICD-10-CM

## 2022-06-28 DIAGNOSIS — R53.81 PHYSICAL DECONDITIONING: ICD-10-CM

## 2022-06-28 PROCEDURE — 97530 THERAPEUTIC ACTIVITIES: CPT | Performed by: PHYSICAL THERAPIST

## 2022-06-28 PROCEDURE — 97110 THERAPEUTIC EXERCISES: CPT | Performed by: PHYSICAL THERAPIST

## 2022-06-28 NOTE — PROGRESS NOTES
Physical Therapy Daily Treatment Note      Patient: Deanna Putnam   : 1985  Referring practitioner: WENDY Jaramillo*  Date of Initial Visit: Type: THERAPY  Noted: 2022  Today's Date: 2022  Patient seen for 3 sessions       Visit Diagnoses:    ICD-10-CM ICD-9-CM   1. Left hip pain  M25.552 719.45   2. Physical deconditioning  R53.81 799.3       Subjective   Patient reports her left hip pain is about the same, notices it most with prolonged sitting in left hip ER position. States she did exercises at home. States  her pain level is 3/10 upon arrival.      Objective   See Exercise, Manual, and Modality Logs for complete treatment.       Assessment/Plan   No LLD noted from supine position. She tolerated progression of standing exercises for hip and pelvic stabilization. Fatigued with left hip abduction. Assess response next visit. Consider manual techniques along ITB and glutes next visit, foam rolling vs DN.       Timed:         Manual Therapy:    0     mins  40944;     Therapeutic Exercise:    25     mins  66403;     Neuromuscular Katie:    0    mins  54378;    Therapeutic Activity:     8     mins  40980;     Gait Trainin     mins  11287;     Ultrasound:     0     mins  66494;    Ionto                               0    mins   32034  Self Care                       0     mins   13423  Canalith Repos    0     mins 88170      Un-Timed:  Electrical Stimulation:    0     mins  21092 ( );  Dry Needling     0     mins self-pay  Traction     0     mins 53730      Timed Treatment:   33   mins   Total Treatment:     33   mins    Corinne E. Perkins, PT  [place] License: [id number]

## 2022-06-30 ENCOUNTER — TREATMENT (OUTPATIENT)
Dept: PHYSICAL THERAPY | Facility: CLINIC | Age: 37
End: 2022-06-30

## 2022-06-30 DIAGNOSIS — R53.81 PHYSICAL DECONDITIONING: ICD-10-CM

## 2022-06-30 DIAGNOSIS — M25.552 LEFT HIP PAIN: Primary | ICD-10-CM

## 2022-06-30 PROCEDURE — 97110 THERAPEUTIC EXERCISES: CPT | Performed by: PHYSICAL THERAPIST

## 2022-06-30 PROCEDURE — 97140 MANUAL THERAPY 1/> REGIONS: CPT | Performed by: PHYSICAL THERAPIST

## 2022-06-30 NOTE — PROGRESS NOTES
Physical Therapy Daily Treatment Note      Patient: Deanna Putnam   : 1985  Referring practitioner: WENDY Jaramillo*  Date of Initial Visit: Type: THERAPY  Noted: 2022  Today's Date: 2022  Patient seen for 4 sessions       Visit Diagnoses:    ICD-10-CM ICD-9-CM   1. Left hip pain  M25.552 719.45   2. Physical deconditioning  R53.81 799.3       Subjective   Patient reports left hip pain was sore after last visit, improved after stretching. States she did go to pool, able to do exercises well, no issues. States  her pain level is 2-3/10 upon arrival.      Objective   See Exercise, Manual, and Modality Logs for complete treatment.       Assessment/Plan   Pt tolerated functional strengthening exercises well. Denies pain with foam rolling along glutes, ITb. Consider DN next visit. Assess response from progressed HEP. Pt will be going on vacation after next week.       Timed:         Manual Therapy:    8     mins  81379;     Therapeutic Exercise:    20     mins  42511;     Neuromuscular Katie:    0    mins  88650;    Therapeutic Activity:     0     mins  98947;     Gait Trainin     mins  39915;     Ultrasound:     0     mins  89807;    Ionto                               0    mins   13340  Self Care                       0     mins   72137  Canalith Repos    0     mins 12881      Un-Timed:  Electrical Stimulation:    0     mins  29576 ( );  Dry Needling     0     mins self-pay  Traction     0     mins 42211      Timed Treatment:   28   mins   Total Treatment:     28   mins    Corinne E. Perkins, PT  [place] License: [id number]

## 2022-07-05 ENCOUNTER — TREATMENT (OUTPATIENT)
Dept: PHYSICAL THERAPY | Facility: CLINIC | Age: 37
End: 2022-07-05

## 2022-07-05 DIAGNOSIS — M25.552 LEFT HIP PAIN: Primary | ICD-10-CM

## 2022-07-05 DIAGNOSIS — R53.81 PHYSICAL DECONDITIONING: ICD-10-CM

## 2022-07-05 PROCEDURE — 97140 MANUAL THERAPY 1/> REGIONS: CPT | Performed by: PHYSICAL THERAPIST

## 2022-07-05 PROCEDURE — 97110 THERAPEUTIC EXERCISES: CPT | Performed by: PHYSICAL THERAPIST

## 2022-07-05 NOTE — PROGRESS NOTES
Physical Therapy Daily Treatment Note      Patient: Deanna Putnam   : 1985  Referring practitioner: WENDY Jaramillo*  Date of Initial Visit: Type: THERAPY  Noted: 2022  Today's Date: 2022  Patient seen for 5 sessions       Visit Diagnoses:    ICD-10-CM ICD-9-CM   1. Left hip pain  M25.552 719.45   2. Physical deconditioning  R53.81 799.3       Subjective   Patient reports her right hip pain has been bothering her since last night, lateral hip pain. States her left hip pain has improved overall, has been doing the exercises daily. States  her pain level is 3-4/10 upon arrival for right hip, not left hip.      Objective   See Exercise, Manual, and Modality Logs for complete treatment.       Assessment/Plan   Patient tolerated standing therex, denies left hip pain; however does report new, onset right lateral hip pain. Added standing ITB stretch for BLE, tolerated well along with foam rolling. Unable to fully r/o lumbar involvement for RLE, as pain does increase with lumbar ROM and grade I-II PA glides to lumbar spine. Could trial LAD next visit for RLE if symptoms persists. Continue progression of pelvic stabilization exercises as tolerated.        Timed:         Manual Therapy:    10     mins  32371;     Therapeutic Exercise:    28     mins  62792;     Neuromuscular Katie:    0    mins  51546;    Therapeutic Activity:     0     mins  35347;     Gait Trainin     mins  71032;     Ultrasound:     0     mins  87960;    Ionto                               0    mins   02797  Self Care                       0     mins   88237  Canalith Repos    0     mins 33631      Un-Timed:  Electrical Stimulation:    0     mins  01345 ( );  Dry Needling     0     mins self-pay  Traction     0     mins 54203      Timed Treatment:   38   mins   Total Treatment:     38   mins    Corinne E. Perkins, PT  [place] License: [id number]

## 2022-07-07 ENCOUNTER — TELEPHONE (OUTPATIENT)
Dept: PHYSICAL THERAPY | Facility: OTHER | Age: 37
End: 2022-07-07

## 2022-07-19 RX ORDER — CHLORTHALIDONE 50 MG/1
TABLET ORAL
Qty: 30 TABLET | Refills: 3 | Status: SHIPPED | OUTPATIENT
Start: 2022-07-19 | End: 2022-10-27

## 2022-07-21 ENCOUNTER — TREATMENT (OUTPATIENT)
Dept: PHYSICAL THERAPY | Facility: CLINIC | Age: 37
End: 2022-07-21

## 2022-07-21 DIAGNOSIS — M25.552 LEFT HIP PAIN: Primary | ICD-10-CM

## 2022-07-21 DIAGNOSIS — R53.81 PHYSICAL DECONDITIONING: ICD-10-CM

## 2022-07-21 PROCEDURE — 97110 THERAPEUTIC EXERCISES: CPT | Performed by: PHYSICAL THERAPIST

## 2022-07-21 NOTE — PROGRESS NOTES
Physical Therapy Re Certification Of Plan of Care  Patient: Deanna Putnam   : 1985  Diagnosis/ICD-10 Code:  Left hip pain [M25.552]  Referring practitioner: BELL Jaramillo  Date of Initial Visit: 2022  Today's Date: 2022  Patient seen for 6 sessions         Visit Diagnoses:    ICD-10-CM ICD-9-CM   1. Left hip pain  M25.552 719.45   2. Physical deconditioning  R53.81 799.3           Subjective Questionnaire: LEFS: 44  Clinical Progress: improved  Home Program Compliance: Yes  Treatment has included: therapeutic exercise, neuromuscular re-education, manual therapy and therapeutic activity      Subjective   Deanna Putnam reports: she has been doing her stretches in bed as needed. States she just returned from [place] trip where she walked a whole lot, increased left lateral hip noted.     Objective        Special Questions  Patient is experiencing night pain and disturbed sleep.     Additional Special Questions  Denies bladder/bowel dysfunction.      Postural Observations  Seated posture: fair        Palpation   Left   Tenderness of the gluteus medius, piriformis and TFL.     Tenderness     Left Hip   Tenderness in the greater trochanter.     Neurological Testing     Sensation     Lumbar   Left   Intact: light touch    Right   Intact: light touch    Hip   Left Hip   Intact: light touch    Right Hip   Intact: light touch    Active Range of Motion     Lumbar   Flexion: 75 degrees with pain  Extension: 27 degrees   Left lateral flexion: 16 degrees   Right lateral flexion: 18 degrees   Left Hip   Flexion: 105 degrees   Extension: Left hip active extension: limited, weakness.   Abduction: 45 degrees with pain    Right Hip   Normal active range of motion    Additional Active Range of Motion Details  ANTHONY ANDERSON    Strength/Myotome Testing     Left Hip   Planes of Motion   Flexion: 4-  Extension: 4-  Abduction: 4-    Right Hip   Normal muscle strength    Tests     Left Hip   Positive ANTHONY, modified  Jony and scour.   SLR: Negative.     Additional Tests Details  (-) LTR for pain  (-) glute set for pain, (-) bridge for pain, just weakness/difficult to perform  (+) active hip extension reproduction of pain left leg.   (-) SLR for pain  (-) prone prop ups  (+) scour for lateral hip pain, not groin pain.     Ambulation     Comments   Pt ambulated with step through gait pattern noted, trendelenburg. Antalgic gait.  Did not assess stairs          Assessment & Plan     Assessment  Impairments: abnormal gait, abnormal or restricted ROM, impaired balance, impaired physical strength and pain with function  Functional Limitations: carrying objects, sleeping, walking, uncomfortable because of pain, sitting and standing  Assessment details: Reassessment completed today for 37 yo female referred to PT for left hip pain who demonstrates signs and symptoms consistent with possible left trochanteric bursitis. Unable to fully r/o referral of pain from lumbar spine; however today symptoms isolated to lateral L hip. She demonstrates mild improvements in L hip AROM; however TTP GT and ITB persists, and weakness of LLE. Pt has PMH including morbid obesity, DM, HTN, depression, and recent swelling of BLE. Pt has started therapy as well for depression and hopes that will also help her overall pain. Recommend continued PT for hip strengthening to decrease pain. If lack of progress in future, consider ortho referral, injection.   Prognosis: good    Goals  Plan Goals: Short Term Goals (2-3 weeks)  1.  Patient to be compliant with initial HEP. MET  2.  Patient to report improved tolerance to prolonged standing/walking with hip pain less than 3/10. MET  3. Patient will report decreased pain rating on VAS from  5-6/10 to 3/10 with ADLs and household activities. ONGOING    Long Term Goals (4-6 weeks)  1. Patient will demonstrate independent HEP progressed for closed chain strength, proprioception, balance and agility with minimal or no  compensations or exacerbations. ONGOING  2. Patient to demonstrate minimal tenderness along the lateral hip/gluteal region. ONGOING   3. Patient to ascend and descend one flight of stairs with one handrail with appropriate safety and minimal deficit. ONGOING  4. Patient to improve LEFS score to at least 45/80. ONGOING      Plan  Therapy options: will be seen for skilled therapy services  Planned modality interventions: cryotherapy, dry needling, electrical stimulation/Russian stimulation and thermotherapy (hydrocollator packs)  Planned therapy interventions: abdominal trunk stabilization, body mechanics training, flexibility, functional ROM exercises, gait training, home exercise program, joint mobilization, manual therapy, neuromuscular re-education, postural training, soft tissue mobilization, strengthening, stretching and therapeutic activities  Frequency: 2x week  Duration in visits: 8  Duration in weeks: 4  Treatment plan discussed with: patient           Recommendations: Continue as planned  Timeframe: 1 month  Prognosis to achieve goals: good      Timed:         Manual Therapy:    0     mins  58767;     Therapeutic Exercise:    38     mins  68855;     Neuromuscular Katie:    0    mins  28849;    Therapeutic Activity:     0     mins  20569;     Gait Trainin     mins  06808;     Ultrasound:     0     mins  70380;    Ionto                               0    mins   39661  Self Care                       0     mins   66570  Canalith Repos    0     mins 78516      Un-Timed:  Electrical Stimulation:    0     mins  09771 ( );  Dry Needling     0     mins self-pay  Traction     0     mins 89822  Re-Eval                           0    mins  75743      Timed Treatment:   38   mins   Total Treatment:     38   mins          PT: Corinne E. Perkins, PT     [place] License:  [id number]    Electronically signed by Corinne E. Perkins, PT, 22, 2:11 PM EDT    Certification Period: 2022 thru 10/18/2022  I certify  that the therapy services are furnished while this patient is under my care.  The services outlined above are required by this patient, and will be reviewed every 90 days.         Physician Signature:__________________________________________________    PHYSICIAN: Roberta Rodriguez PA-C   NPI: [id number]     DATE:     Please sign and return via fax to [email or web address] . Thank you, [facility]

## 2022-07-25 ENCOUNTER — TREATMENT (OUTPATIENT)
Dept: PHYSICAL THERAPY | Facility: CLINIC | Age: 37
End: 2022-07-25

## 2022-07-25 DIAGNOSIS — R53.81 PHYSICAL DECONDITIONING: ICD-10-CM

## 2022-07-25 DIAGNOSIS — M25.552 LEFT HIP PAIN: Primary | ICD-10-CM

## 2022-07-25 PROCEDURE — 97110 THERAPEUTIC EXERCISES: CPT | Performed by: PHYSICAL THERAPIST

## 2022-07-25 NOTE — PROGRESS NOTES
Physical Therapy Daily Treatment Note      Patient: Deanna Putnam   : 1985  Referring practitioner: WENDY Jaramillo*  Date of Initial Visit: Type: THERAPY  Noted: 2022  Today's Date: 2022  Patient seen for 7 sessions       Visit Diagnoses:    ICD-10-CM ICD-9-CM   1. Left hip pain  M25.552 719.45   2. Physical deconditioning  R53.81 799.3       Subjective   Patient reports no new pain for her left hip. States  her pain level is 2/10 upon arrival.  States she will likely start going to the [facility] this week. States the frequency of pain has decreased in left lateral hip but intensity has stayed the same when it is present.     Objective   See Exercise, Manual, and Modality Logs for complete treatment.       Assessment/Plan   Pt demonstrates mild improvement in left hip mobility during ER stretch. Tolerated progression well, mild muscle fatigue with 3 way hip when WB on LLE. Mild increase in pain during tandem stance with LLE forward. Improved with stretching. Progress HEP next visit to include 3 way hip, forward modified lunge, tandem and modified tandem stance as tolerated.       Timed:         Manual Therapy:    0     mins  09362;     Therapeutic Exercise:    38     mins  76332;     Neuromuscular Katie:    0    mins  29044;    Therapeutic Activity:     0     mins  93024;     Gait Trainin     mins  65021;     Ultrasound:     0     mins  02293;    Ionto                               0    mins   72980  Self Care                       0     mins   91438  Canalith Repos    0     mins 26123      Un-Timed:  Electrical Stimulation:    0     mins  73950 ( );  Dry Needling     0     mins self-pay  Traction     0     mins 99188      Timed Treatment:   38   mins   Total Treatment:     38   mins    Corinne E. Perkins, PT  [place] License: [id number]

## 2022-07-28 ENCOUNTER — TREATMENT (OUTPATIENT)
Dept: PHYSICAL THERAPY | Facility: CLINIC | Age: 37
End: 2022-07-28

## 2022-07-28 DIAGNOSIS — R53.81 PHYSICAL DECONDITIONING: ICD-10-CM

## 2022-07-28 DIAGNOSIS — M25.552 LEFT HIP PAIN: Primary | ICD-10-CM

## 2022-07-28 PROCEDURE — 97110 THERAPEUTIC EXERCISES: CPT | Performed by: PHYSICAL THERAPIST

## 2022-07-28 PROCEDURE — 97112 NEUROMUSCULAR REEDUCATION: CPT | Performed by: PHYSICAL THERAPIST

## 2022-07-28 NOTE — PROGRESS NOTES
Physical Therapy Daily Treatment Note      Patient: Deanna Putnam   : 1985  Referring practitioner: WENDY Jaramillo*  Date of Initial Visit: Type: THERAPY  Noted: 2022  Today's Date: 2022  Patient seen for 8 sessions       Visit Diagnoses:    ICD-10-CM ICD-9-CM   1. Left hip pain  M25.552 719.45   2. Physical deconditioning  R53.81 799.3       Subjective   Patient reports she has been doing her exercises, reports continued left lateral hip pain with prolonged weight bearing through left leg. States  her pain level is 2-3/10 upon arrival.      Objective   See Exercise, Manual, and Modality Logs for complete treatment.       Assessment/Plan  Patient noted mild left knee joint pain with attempted wall slide squats. She demonstrates improved dynamic standing balance with mod tandem and tandem stance. She is compliant with current HEP. Continue progression of hip strengthening exercises as able.     Timed:         Manual Therapy:    0     mins  97050;     Therapeutic Exercise:    28     mins  03390;     Neuromuscular Katie:    10    mins  46904;    Therapeutic Activity:     0     mins  84951;     Gait Trainin     mins  16643;     Ultrasound:     0     mins  33152;    Ionto                               0    mins   27020  Self Care                       0     mins   51728  Canalith Repos    0     mins 31075      Un-Timed:  Electrical Stimulation:    0     mins  86295 ( );  Dry Needling     0     mins self-pay  Traction     0     mins 38712      Timed Treatment:   38   mins   Total Treatment:     38   mins    Corinne E. Perkins, PT  [place] License: [id number]

## 2022-08-01 ENCOUNTER — TREATMENT (OUTPATIENT)
Dept: PHYSICAL THERAPY | Facility: CLINIC | Age: 37
End: 2022-08-01

## 2022-08-01 DIAGNOSIS — M25.552 LEFT HIP PAIN: Primary | ICD-10-CM

## 2022-08-01 DIAGNOSIS — R53.81 PHYSICAL DECONDITIONING: ICD-10-CM

## 2022-08-01 PROCEDURE — 97110 THERAPEUTIC EXERCISES: CPT | Performed by: PHYSICAL THERAPIST

## 2022-08-01 PROCEDURE — 97140 MANUAL THERAPY 1/> REGIONS: CPT | Performed by: PHYSICAL THERAPIST

## 2022-08-01 NOTE — PROGRESS NOTES
Physical Therapy Daily Treatment Note      Patient: Deanna Putnam   : 1985  Referring practitioner: WENDY Jaramillo*  Date of Initial Visit: Type: THERAPY  Noted: 2022  Today's Date: 2022  Patient seen for 9 sessions       Visit Diagnoses:    ICD-10-CM ICD-9-CM   1. Left hip pain  M25.552 719.45   2. Physical deconditioning  R53.81 799.3       Subjective   Patient reports she went swimming over the weekend, for about 2-3 hours. States she noted bilateral hip pain afterwards, the next day, pain laterally in both hips. States she feels she has been walking different due to hip pain. States  her pain level is 4/10 upon arrival in left hip, 3/10 in right hip.     Objective   See Exercise, Manual, and Modality Logs for complete treatment.       Assessment/Plan   Patient verbalized new symptoms of right hip flexor tension and mild pain. Improved with hip flexor stretch and manual techniques today. Pt able to perform sit-stands from lower surface, BUE on thighs, 5x prior to fatigue. Added supine hip flexor stretch to current HEP. Assess response from manual next visit and from progressed standing exercises. Resume elliptical next visit, pending symptoms. Review diaphragmatic breathing.       Timed:         Manual Therapy:    8     mins  32439;     Therapeutic Exercise:    32     mins  56102;     Neuromuscular Katie:    0    mins  15402;    Therapeutic Activity:     0     mins  25096;     Gait Trainin     mins  94378;     Ultrasound:     0     mins  24170;    Ionto                               0    mins   66780  Self Care                       0     mins   59833  Canalith Repos    0     mins 23584      Un-Timed:  Electrical Stimulation:    0     mins  35898 ( );  Dry Needling     0     mins self-pay  Traction     0     mins 53531      Timed Treatment:   40   mins   Total Treatment:     45   mins    Corinne E. Perkins, PT  [place] License: [id number]

## 2022-08-04 ENCOUNTER — TREATMENT (OUTPATIENT)
Dept: PHYSICAL THERAPY | Facility: CLINIC | Age: 37
End: 2022-08-04

## 2022-08-04 DIAGNOSIS — R53.81 PHYSICAL DECONDITIONING: ICD-10-CM

## 2022-08-04 DIAGNOSIS — M25.552 LEFT HIP PAIN: Primary | ICD-10-CM

## 2022-08-04 PROCEDURE — 97112 NEUROMUSCULAR REEDUCATION: CPT | Performed by: PHYSICAL THERAPIST

## 2022-08-04 PROCEDURE — 97110 THERAPEUTIC EXERCISES: CPT | Performed by: PHYSICAL THERAPIST

## 2022-08-04 NOTE — PROGRESS NOTES
Physical Therapy Daily Treatment Note      Patient: Deanna Putnam   : 1985  Referring practitioner: WENDY Jaramillo*  Date of Initial Visit: Type: THERAPY  Noted: 2022  Today's Date: 2022  Patient seen for 10 sessions       Visit Diagnoses:    ICD-10-CM ICD-9-CM   1. Left hip pain  M25.552 719.45   2. Physical deconditioning  R53.81 799.3       Subjective   Patient reports she has been feeling better, has been doing HEP at home. 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 States  her pain level is 0/10 upon arrival.      Objective   See Exercise, Manual, and Modality Logs for complete treatment.       Assessment/Plan   Pt demonstrates improved hip stabilization and neutral positioning with standing 3 way hip exercises. Denies increased lateral hip pain today. Tolerated modified forward and lateral lunges well. Reviewed sit-stand vs mod squats for HEP.       Timed:         Manual Therapy:    0     mins  70235;     Therapeutic Exercise:    29     mins  79161;     Neuromuscular Katie:    9    mins  90308;    Therapeutic Activity:     0     mins  48603;     Gait Trainin     mins  30595;     Ultrasound:     0     mins  19317;    Ionto                               0    mins   32261  Self Care                       0     mins   03976  Canalith Repos    0     mins 57211      Un-Timed:  Electrical Stimulation:    0     mins  25718 ( );  Dry Needling     0     mins self-pay  Traction     0     mins 76529      Timed Treatment:   38   mins   Total Treatment:     38   mins    Corinne E. Perkins, PT  [place] License: [id number]

## 2022-08-08 ENCOUNTER — TREATMENT (OUTPATIENT)
Dept: PHYSICAL THERAPY | Facility: CLINIC | Age: 37
End: 2022-08-08

## 2022-08-08 DIAGNOSIS — M25.552 LEFT HIP PAIN: Primary | ICD-10-CM

## 2022-08-08 DIAGNOSIS — R53.81 PHYSICAL DECONDITIONING: ICD-10-CM

## 2022-08-08 PROCEDURE — 97110 THERAPEUTIC EXERCISES: CPT | Performed by: PHYSICAL THERAPIST

## 2022-08-08 NOTE — PROGRESS NOTES
Physical Therapy Daily Treatment Note      Patient: Deanna Putnam   : 1985  Referring practitioner: WENDY Jaramillo*  Date of Initial Visit: Type: THERAPY  Noted: 2022  Today's Date: 2022  Patient seen for 11 sessions       Visit Diagnoses:    ICD-10-CM ICD-9-CM   1. Left hip pain  M25.552 719.45   2. Physical deconditioning  R53.81 799.3       Subjective   Patient reports she is fatigued today, first day back to work. States  her pain level is 0/10 upon arrival.      Objective   See Exercise, Manual, and Modality Logs for complete treatment.       Assessment/Plan  Pt is making steady progress with improved functional strength. Difficulty noted with low sit-stand, mimicking standing from low chair at work. Review floor transfer next visit, trial tall kneeling exercise into standing.     Timed:         Manual Therapy:    0     mins  35044;     Therapeutic Exercise:    41     mins  92115;     Neuromuscular Katie:    0    mins  62877;    Therapeutic Activity:     0     mins  77510;     Gait Trainin     mins  73151;     Ultrasound:     0     mins  27723;    Ionto                               0    mins   28807  Self Care                       0     mins   50261  Canalith Repos    0     mins 92804      Un-Timed:  Electrical Stimulation:    0     mins  87588 ( );  Dry Needling     0     mins self-pay  Traction     0     mins 94103      Timed Treatment:   41   mins   Total Treatment:     41   mins    Corinne E. Perkins, PT  [place] License: [id number]

## 2022-08-15 ENCOUNTER — TREATMENT (OUTPATIENT)
Dept: PHYSICAL THERAPY | Facility: CLINIC | Age: 37
End: 2022-08-15

## 2022-08-15 DIAGNOSIS — R53.81 PHYSICAL DECONDITIONING: ICD-10-CM

## 2022-08-15 DIAGNOSIS — M25.552 LEFT HIP PAIN: Primary | ICD-10-CM

## 2022-08-15 PROCEDURE — 97110 THERAPEUTIC EXERCISES: CPT | Performed by: PHYSICAL THERAPIST

## 2022-08-15 PROCEDURE — 97112 NEUROMUSCULAR REEDUCATION: CPT | Performed by: PHYSICAL THERAPIST

## 2022-08-15 NOTE — PROGRESS NOTES
Physical Therapy Daily Treatment Note      Patient: Deanna Putnam   : 1985  Referring practitioner: WENDY Jaramillo*  Date of Initial Visit: Type: THERAPY  Noted: 2022  Today's Date: 8/15/2022  Patient seen for 12 sessions       Visit Diagnoses:    ICD-10-CM ICD-9-CM   1. Left hip pain  M25.552 719.45   2. 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 States her left hip pain has been doing well overall. States  her pain level is 0-1/10 upon arrival.      Objective   See Exercise, Manual, and Modality Logs for complete treatment.       Assessment/Plan   Pt is making steady progress towards her functional goals, tolerated increased time on elliptical prior to fatigue or SOA. Denies increased left lateral hip pain, does report muscular fatigue with increased reps and resistance in clinic. She is compliant with current HEP. Will decrease PT frequency to conserve visits, increased emphasis on HEP. F/u in 2 weeks for reassessment. If improvement persists, progress HEP and anticipate d/c.       Timed:         Manual Therapy:    0     mins  55370;     Therapeutic Exercise:    28     mins  29714;     Neuromuscular Katie:    10    mins  10011;    Therapeutic Activity:     0     mins  42653;     Gait Trainin     mins  38433;     Ultrasound:     0     mins  01010;    Ionto                               0    mins   98227  Self Care                       0     mins   05636  Canalith Repos    0     mins 92214      Un-Timed:  Electrical Stimulation:    0     mins  69025 ( );  Dry Needling     0     mins self-pay  Traction     0     mins 91938      Timed Treatment:   38   mins   Total Treatment:     38   mins    Corinne E. Perkins, PT  [place] License: [id number]

## 2022-08-22 ENCOUNTER — OFFICE VISIT (OUTPATIENT)
Dept: SLEEP MEDICINE | Facility: HOSPITAL | Age: 37
End: 2022-08-22

## 2022-08-22 VITALS
DIASTOLIC BLOOD PRESSURE: 90 MMHG | BODY MASS INDEX: 45.43 KG/M2 | WEIGHT: 256.4 LBS | HEIGHT: 63 IN | OXYGEN SATURATION: 99 % | SYSTOLIC BLOOD PRESSURE: 175 MMHG | HEART RATE: 84 BPM

## 2022-08-22 DIAGNOSIS — G47.33 OSA (OBSTRUCTIVE SLEEP APNEA): ICD-10-CM

## 2022-08-22 DIAGNOSIS — G47.19 EXCESSIVE DAYTIME SLEEPINESS: ICD-10-CM

## 2022-08-22 DIAGNOSIS — E66.01 OBESITY, MORBID, BMI 40.0-49.9: Primary | ICD-10-CM

## 2022-08-22 PROCEDURE — 99204 OFFICE O/P NEW MOD 45 MIN: CPT | Performed by: INTERNAL MEDICINE

## 2022-08-22 NOTE — PROGRESS NOTES
Deanna Putnam is a 36 y.o. female.   Chief Complaint   Patient presents with   • Sleeping Problem       HPI     36 y.o. female seen in consultation at the request of Roberta Rodriguez, * for evaluation of the above.     She describes longstanding history of snoring.  She also notes nonrestorative sleep/daytime somnolence.  Her Rea Sleepiness Scale is elevated at 18 currently.    She has frequent awakenings at night.  These can sometimes be as frequent as hourly.  She notes morning headaches and a dry mouth.  She denies any nocturnal hallucinations or RLS type symptoms.  She will take naps when she can.    Rea Scale is: 18/24    The patient's relevant past medical, surgical, family, and social history reviewed and updated in Epic as appropriate.    Current medications are:   Current Outpatient Medications:   •  amLODIPine (NORVASC) 10 MG tablet, Take 1 tablet by mouth Daily., Disp: 90 tablet, Rfl: 1  •  budesonide-formoterol (SYMBICORT) 160-4.5 MCG/ACT inhaler, Inhale 2 puffs 2 (Two) Times a Day., Disp: 10.2 g, Rfl: 3  •  cetirizine (zyrTEC) 10 MG tablet, Take 1 tablet by mouth Daily., Disp: 90 tablet, Rfl: 1  •  chlorthalidone (HYGROTEN) 50 MG tablet, TAKE 1 TABLET BY MOUTH EVERY DAY, Disp: 30 tablet, Rfl: 3  •  Dulaglutide 0.75 MG/0.5ML solution pen-injector, Inject 0.75 mg under the skin into the appropriate area as directed 1 (One) Time Per Week. (Patient taking differently: Inject 0.5 mL under the skin into the appropriate area as directed 1 (One) Time Per Week. Hasn't started yet), Disp: 1 pen, Rfl: 5  •  fluticasone (FLONASE) 50 MCG/ACT nasal spray, 2 sprays into the nostril(s) as directed by provider Daily. Wasn't sent in, Disp: 9.9 mL, Rfl: 5  •  lisinopril (PRINIVIL,ZESTRIL) 40 MG tablet, Take 1 tablet by mouth Daily., Disp: 30 tablet, Rfl: 3  •  furosemide (Lasix) 20 MG tablet, Take 1 tablet by mouth 2 (Two) Times a Day., Disp: 30 tablet, Rfl: 0.    Review of Systems    Review of  "Systems  ROS documented in patient questionnaire ×14 systems.  Reviewed with patient.  Otherwise negative except as noted in HPI.    Physical Exam    Blood pressure 175/90, pulse 84, height 160 cm (63\"), weight 116 kg (256 lb 6.4 oz), SpO2 99 %, not currently breastfeeding. Body mass index is 45.42 kg/m².    Physical Exam  Vitals and nursing note reviewed.   Constitutional:       Appearance: Normal appearance. She is well-developed.   HENT:      Head: Normocephalic and atraumatic.      Nose: Nose normal.      Mouth/Throat:      Mouth: Mucous membranes are moist.      Pharynx: Oropharynx is clear. No oropharyngeal exudate.      Comments: Class IV airway  Eyes:      General: No scleral icterus.     Conjunctiva/sclera: Conjunctivae normal.   Neck:      Thyroid: No thyromegaly.      Trachea: No tracheal deviation.   Cardiovascular:      Rate and Rhythm: Normal rate and regular rhythm.      Heart sounds: No murmur heard.    No friction rub. No gallop.   Pulmonary:      Effort: Pulmonary effort is normal. No respiratory distress.      Breath sounds: No wheezing or rales.   Musculoskeletal:         General: No deformity. Normal range of motion.   Skin:     General: Skin is warm and dry.      Findings: No rash.   Neurological:      Mental Status: She is alert and oriented to person, place, and time.   Psychiatric:         Behavior: Behavior normal.         Thought Content: Thought content normal.         DATA:    Reviewed 5/26/2022 note from FRANCOIS Rodriguez PA-C    Reviewed 4/22/2022 labs including bicarbonate 26 and hemoglobin 10.3    ASSESSMENT:    Problem List Items Addressed This Visit        Pulmonary Problems    HASEEB (obstructive sleep apnea) - possible    Relevant Orders    Home Sleep Study       Other    Obesity, morbid, BMI 40.0-49.9 ([facility]) - Primary    Excessive daytime sleepiness    Relevant Orders    Home Sleep Study          36-year-old female with signs and symptoms that suggest the possibility of obstructive sleep " apnea.  She has snoring which is significant.  She has symptoms of frequent awakenings, morning headaches, dry mouth, and nonrestorative sleep/daytime somnolence.    She is a good candidate for a home sleep apnea test.  She also has an appointment with bariatric surgery to consider surgical weight loss options.    PLAN:    1. Home sleep apnea testing as above  2. I discussed the diagnostic process with her  3. I went over the long-term risks of untreated obstructive sleep apnea  4. We discussed treatment options for HASEEB if that became necessary, which seems likely  5. We discussed the importance of long-term weight loss and I supported her decision to consider surgical therapy if other avenues were not effective  6. Sleep center follow-up    I have reviewed the results of my evaluation and impression and discussed my recommendations in detail with the patient.    Level of Risk Moderate due to: undiagnosed new problem    Signed by  Pedrito Bowden MD    August 22, 2022      CC: Roberta Rodriguez, Roberta George, *

## 2022-09-06 ENCOUNTER — HOSPITAL ENCOUNTER (OUTPATIENT)
Dept: SLEEP MEDICINE | Facility: HOSPITAL | Age: 37
Discharge: HOME | End: 2022-09-06
Admitting: INTERNAL MEDICINE

## 2022-09-06 VITALS — HEIGHT: 63 IN | WEIGHT: 256 LBS | BODY MASS INDEX: 45.36 KG/M2

## 2022-09-06 DIAGNOSIS — G47.33 OSA (OBSTRUCTIVE SLEEP APNEA): ICD-10-CM

## 2022-09-06 DIAGNOSIS — G47.19 EXCESSIVE DAYTIME SLEEPINESS: ICD-10-CM

## 2022-09-06 PROCEDURE — 95800 SLP STDY UNATTENDED: CPT | Performed by: INTERNAL MEDICINE

## 2022-09-06 PROCEDURE — 95800 SLP STDY UNATTENDED: CPT

## 2022-09-09 ENCOUNTER — TELEPHONE (OUTPATIENT)
Dept: SLEEP MEDICINE | Facility: HOSPITAL | Age: 37
End: 2022-09-09

## 2022-09-09 DIAGNOSIS — G47.33 OSA (OBSTRUCTIVE SLEEP APNEA): Primary | ICD-10-CM

## 2022-09-09 NOTE — TELEPHONE ENCOUNTER
----- Message from Jeannine Belle, RRT, RPSGT, RST sent at 9/7/2022  4:36 PM EDT -----  Regarding: autopap  Hello,  Per scored report, patient was mild at 7.3 AHI becoming moderate at 24 AHI during REM. Treatment and follow-up options are per provider recommendations. Thanks-

## 2022-09-12 ENCOUNTER — TELEPHONE (OUTPATIENT)
Dept: INTERNAL MEDICINE | Facility: CLINIC | Age: 37
End: 2022-09-12

## 2022-09-12 NOTE — TELEPHONE ENCOUNTER
Fax received from [facility].  Jardiance is nonformulary but I don't see this on her med list.  Just confirming this.

## 2022-09-21 RX ORDER — LISINOPRIL 40 MG/1
TABLET ORAL
Qty: 90 TABLET | Refills: 1 | Status: SHIPPED | OUTPATIENT
Start: 2022-09-21

## 2022-10-01 NOTE — TELEPHONE ENCOUNTER
Rx Refill Note  Requested Prescriptions     Pending Prescriptions Disp Refills   • Symbicort 160-4.5 MCG/ACT inhaler [Pharmacy Med Name: SYMBICORT 160-4.5 MCG INHALER] 10.2 each 3     Sig: INHALE 2 PUFFS TWICE A DAY      Last office visit with prescribing clinician: 5/26/2022      Next office visit with prescribing clinician: Visit date not found            Alize Can LPN  10/01/22, 11:43 EDT

## 2022-10-02 RX ORDER — BUDESONIDE AND FORMOTEROL FUMARATE DIHYDRATE 160; 4.5 UG/1; UG/1
AEROSOL RESPIRATORY (INHALATION)
Qty: 10.2 EACH | Refills: 3 | Status: SHIPPED | OUTPATIENT
Start: 2022-10-02

## 2022-10-24 ENCOUNTER — DOCUMENTATION (OUTPATIENT)
Dept: CARDIOLOGY | Facility: CLINIC | Age: 37
End: 2022-10-24

## 2022-10-24 NOTE — PROGRESS NOTES
Lm on vm to call back to confirm appt     PCP epic    Pulmonary- Epic-   Echo- epic   Renal duplex- epic     [facility] 4/2022 for HTN emergency

## 2022-10-25 ENCOUNTER — OFFICE VISIT (OUTPATIENT)
Dept: CARDIOLOGY | Facility: CLINIC | Age: 37
End: 2022-10-25

## 2022-10-25 VITALS
DIASTOLIC BLOOD PRESSURE: 76 MMHG | OXYGEN SATURATION: 95 % | BODY MASS INDEX: 45.54 KG/M2 | HEART RATE: 105 BPM | SYSTOLIC BLOOD PRESSURE: 136 MMHG | WEIGHT: 257 LBS | HEIGHT: 63 IN

## 2022-10-25 DIAGNOSIS — I10 ESSENTIAL HYPERTENSION: Primary | ICD-10-CM

## 2022-10-25 PROCEDURE — 93000 ELECTROCARDIOGRAM COMPLETE: CPT | Performed by: INTERNAL MEDICINE

## 2022-10-25 PROCEDURE — 99204 OFFICE O/P NEW MOD 45 MIN: CPT | Performed by: INTERNAL MEDICINE

## 2022-10-25 NOTE — PROGRESS NOTES
[facility]   Consult  Deanna Putnam  1985    VISIT DATE:  10/25/22    PCP:   Roberta Rodriguez PA-C  [address] [place] [phone]        CC:  Mitral valve regurgitation (CONSULT/) and Hypertension      Problem List:  1.  HTN  2.  DM  3.  HASEEB on CPAP  4.  Asthma    Cardiac testing:    Echo April 2022:   • Moderate mitral valve regurgitation is present.  • Estimated right ventricular systolic pressure from tricuspid regurgitation is mildly elevated (35-45 mmHg). Calculated right ventricular systolic pressure from tricuspid regurgitation is 35 mmHg.  • Normal LVSF    Renal duplex April 2022: No evidence of bilateral renal artery stenosis    History of Present Illness:  Deanna Putnam  Is a 37 y.o. female with pertinent cardiac history detailed above.  Patient has a history of hypertension and diabetes.  An echo in April showed mitral valve regurgitation which was moderate.She was admitted in April with dyspnea, HTN urgency.  BP on admission was significantly elevated.  Treated with BIPAP, lasix.  She was not complaint with her BP medications prior to admission.  Renal artery duplex was negative.  BP is better in the office today.  EKG sinus tach, mild non-specific changes.  She did have pulmonary edema when she was admitted.  Symptoms at that time were significant dyspnea on exertion.  Since discharge in April she has not had recurrence of these.  She is compliant with all medications now.  She does not have any symptoms of chest pain.  She is established on a CPAP for HASEEB at home.  Discussed diet and exercise measures that can help with blood pressure.  Currently she does not restrict salt intake and does not do routine exercise.      Patient Active Problem List    Diagnosis Date Noted   • HASEEB (obstructive sleep apnea) - possible 08/22/2022   • Excessive daytime sleepiness 08/22/2022   • Allergic rhinitis 05/27/2022   • Left hip pain 05/27/2022      Note Last Updated: 5/27/2022     Referral to PT for evaluation.     • Physical deconditioning 05/27/2022   • Ankle edema, bilateral 05/27/2022     Note Last Updated: 5/27/2022     We will start chlorthalidone 50 mg tablets daily.  Follow-up in 1 month.  Try and elevate legs throughout the day.     • Anemia, iron deficiency 05/26/2022   • Diabetes (HCC) 05/26/2022     Note Last Updated: 5/27/2022     She is gained 9 pounds since her last appointment 1 month.  She reports compliance with medications.  Close follow-up warranted.       • Hypertensive emergency 04/20/2022   • Hyponatremia 04/20/2022   • Medically noncompliant 04/20/2022   • Menorrhagia with regular cycle 06/01/2020     Note Last Updated: 5/24/2021     Likely due to fibroid uterus.  She has had consultation with gynecology.  I do feel her heavy periods may be the source of her decreasing hemoglobin.  We will continue to monitor.  Gynecologist did recommend hysterectomy, but patient wants to see if she can biologically have children.  Discussed possible Mirena, will depend on location and size of fibroids.     • Hip pain, bilateral 06/01/2020   • Vitamin D deficiency 06/01/2020   • Arthralgia of both knees 03/02/2020   • Screening, lipid 03/02/2020   • Bloating 03/02/2020   • T2DM  08/15/2019   • Essential hypertension 02/11/2019   • Obesity, morbid, BMI 40.0-49.9 ([facility]) 02/11/2019   • Recurrent major depressive disorder, in full remission ([facility]) 02/11/2019     Note Last Updated: 5/24/2021     She has discontinued Wellbutrin altogether.  She is currently seeing therapist.  We will continue to monitor for now.     • Mild intermittent asthma without complication 02/11/2019     Note Last Updated: 5/24/2021     Continue Symbicort as directed.  Continue Ventolin as directed.     • PCOS (polycystic ovarian syndrome) 02/11/2019   • Intramural and subserous leiomyoma of uterus 12/18/2017       Allergies   Allergen Reactions   • Eggs Or Egg-Derived Products Nausea  And Vomiting   • Mold Extract [Trichophyton] Unknown (See Comments)     unknown       Social History     Socioeconomic History   • Marital status: Single   Tobacco Use   • Smoking status: Never   • Smokeless tobacco: Never   Vaping Use   • Vaping Use: Never used   Substance and Sexual Activity   • Alcohol use: Not Currently     Alcohol/week: 2.0 standard drinks     Types: 2 Glasses of wine per week     Comment: rarely   • Drug use: No   • Sexual activity: Yes     Partners: Female, Male     Birth control/protection: Condom       Family History   Problem Relation Age of Onset   • Endometrial cancer Mother 59   • Diabetes Mother    • Hypertension Mother    • Cancer Mother    • Hyperlipidemia Mother    • Miscarriages / Stillbirths Mother    • Vision loss Mother    • Breast cancer Maternal Grandmother 80   • Colon cancer Maternal Grandmother 90   • Arthritis Maternal Grandmother    • Birth defects Maternal Grandmother    • Diabetes Maternal Grandmother    • Cancer Maternal Grandmother    • Hyperlipidemia Maternal Grandmother    • Vision loss Maternal Grandmother    • Breast cancer Maternal Aunt 60   • Cancer Maternal Aunt    • Diabetes Father    • Hypertension Father    • Hyperlipidemia Father    • Diabetes Paternal Grandmother    • Hypertension Paternal Grandmother    • Hyperlipidemia Paternal Grandmother    • Hypertension Paternal Grandfather    • Diabetes Paternal Grandfather    • Hyperlipidemia Paternal Grandfather    • Uterine cancer Neg Hx    • Ovarian cancer Neg Hx        Current Medications:    Current Outpatient Medications:   •  amLODIPine (NORVASC) 10 MG tablet, Take 1 tablet by mouth Daily., Disp: 90 tablet, Rfl: 1  •  cetirizine (zyrTEC) 10 MG tablet, Take 1 tablet by mouth Daily., Disp: 90 tablet, Rfl: 1  •  chlorthalidone (HYGROTEN) 50 MG tablet, TAKE 1 TABLET BY MOUTH EVERY DAY, Disp: 30 tablet, Rfl: 3  •  Dulaglutide 0.75 MG/0.5ML solution pen-injector, Inject 0.75 mg under the skin into the appropriate  "area as directed 1 (One) Time Per Week. (Patient taking differently: Inject 0.75 mg under the skin into the appropriate area as directed 1 (One) Time Per Week. Hasn't started yet), Disp: 1 pen, Rfl: 5  •  fluticasone (FLONASE) 50 MCG/ACT nasal spray, 2 sprays into the nostril(s) as directed by provider Daily. Wasn't sent in, Disp: 9.9 mL, Rfl: 5  •  lisinopril (PRINIVIL,ZESTRIL) 40 MG tablet, TAKE 1 TABLET BY MOUTH EVERY DAY, Disp: 90 tablet, Rfl: 1  •  Symbicort 160-4.5 MCG/ACT inhaler, INHALE 2 PUFFS TWICE A DAY, Disp: 10.2 each, Rfl: 3     Review of Systems   Constitutional: Negative for weight gain.   Cardiovascular: Negative for chest pain, irregular heartbeat, leg swelling, near-syncope and syncope.   Respiratory: Negative for shortness of breath.        Vitals:    10/25/22 1247   BP: 136/76   BP Location: Right arm   Patient Position: Sitting   Pulse: 105   SpO2: 95%   Weight: 117 kg (257 lb)   Height: 160 cm (62.99\")       Physical Exam  Constitutional:       Appearance: Normal appearance.   Cardiovascular:      Rate and Rhythm: Regular rhythm. Tachycardia present.      Pulses: Normal pulses.      Heart sounds: Normal heart sounds.   Pulmonary:      Effort: Pulmonary effort is normal.      Breath sounds: Normal breath sounds. No rales.   Musculoskeletal:      Right lower leg: No edema.      Left lower leg: No edema.   Neurological:      Mental Status: She is alert.   Psychiatric:         Mood and Affect: Mood normal.         Diagnostic Data:    ECG 12 Lead    Date/Time: 10/25/2022 1:13 PM  Performed by: Medaht Marinelli MD  Authorized by: Medhat Marinelli MD   Comparison: compared with previous ECG from 4/20/2022  Similar to previous ECG  Rhythm: sinus tachycardia  Rate: tachycardic  BPM: 105  QRS axis: normal  Other findings: non-specific ST-T wave changes    Clinical impression: abnormal EKG          Lab Results   Component Value Date    CHLPL 154 06/15/2015    TRIG 149 05/26/2021    HDL 42 " 05/26/2021     Lab Results   Component Value Date    GLUCOSE 121 (H) 04/22/2022    BUN 10 04/22/2022    CREATININE 0.72 04/22/2022     04/22/2022    K 4.7 04/22/2022     04/22/2022    CO2 26.0 04/22/2022     Lab Results   Component Value Date    HGBA1C 6.70 (H) 04/20/2022     Lab Results   Component Value Date    WBC 8.81 04/22/2022    HGB 10.3 (L) 04/22/2022    HCT 33.7 (L) 04/22/2022     04/22/2022       Assessment:   Diagnosis Plan   1. Essential hypertension  ECG 12 Lead    Comprehensive Metabolic Panel    Lipid Panel    CBC & Differential          Plan:      1.  HTN  -BP improved while compliant with medications: Chlorthalidone, lisinopril, amlodipine  -Negative renal artery duplex  -most recent .  We will check updated CMP and lipids.    2.  Diabetes  -  - Last A1c 6.7    3.  Moderate mitral regurgitation  initially diagnosed during admission with HTN urgency, which may have exacerbated degree of severity  -EF  55%  -Repeat echo summer 2023    Follow-up in about 6 months      Medhat Marinelli MD [facility]

## 2022-10-26 RX ORDER — AMLODIPINE BESYLATE 10 MG/1
TABLET ORAL
Qty: 90 TABLET | Refills: 1 | Status: SHIPPED | OUTPATIENT
Start: 2022-10-26 | End: 2023-02-22

## 2022-10-27 RX ORDER — CHLORTHALIDONE 50 MG/1
TABLET ORAL
Qty: 90 TABLET | Refills: 1 | Status: SHIPPED | OUTPATIENT
Start: 2022-10-27 | End: 2023-02-22

## 2022-11-16 ENCOUNTER — LAB (OUTPATIENT)
Dept: LAB | Facility: HOSPITAL | Age: 37
End: 2022-11-16

## 2022-11-16 DIAGNOSIS — I10 ESSENTIAL HYPERTENSION: ICD-10-CM

## 2022-11-16 LAB
BASOPHILS # BLD AUTO: 0.05 10*3/MM3 (ref 0–0.2)
BASOPHILS NFR BLD AUTO: 0.6 % (ref 0–1.5)
DEPRECATED RDW RBC AUTO: 44.8 FL (ref 37–54)
EOSINOPHIL # BLD AUTO: 0.07 10*3/MM3 (ref 0–0.4)
EOSINOPHIL NFR BLD AUTO: 0.9 % (ref 0.3–6.2)
ERYTHROCYTE [DISTWIDTH] IN BLOOD BY AUTOMATED COUNT: 16 % (ref 12.3–15.4)
HCT VFR BLD AUTO: 35.3 % (ref 34–46.6)
HGB BLD-MCNC: 11 G/DL (ref 12–15.9)
IMM GRANULOCYTES # BLD AUTO: 0.06 10*3/MM3 (ref 0–0.05)
IMM GRANULOCYTES NFR BLD AUTO: 0.8 % (ref 0–0.5)
LYMPHOCYTES # BLD AUTO: 3.19 10*3/MM3 (ref 0.7–3.1)
LYMPHOCYTES NFR BLD AUTO: 40.8 % (ref 19.6–45.3)
MCH RBC QN AUTO: 24.5 PG (ref 26.6–33)
MCHC RBC AUTO-ENTMCNC: 31.2 G/DL (ref 31.5–35.7)
MCV RBC AUTO: 78.6 FL (ref 79–97)
MONOCYTES # BLD AUTO: 0.48 10*3/MM3 (ref 0.1–0.9)
MONOCYTES NFR BLD AUTO: 6.1 % (ref 5–12)
NEUTROPHILS NFR BLD AUTO: 3.97 10*3/MM3 (ref 1.7–7)
NEUTROPHILS NFR BLD AUTO: 50.8 % (ref 42.7–76)
NRBC BLD AUTO-RTO: 0 /100 WBC (ref 0–0.2)
PLATELET # BLD AUTO: 432 10*3/MM3 (ref 140–450)
PMV BLD AUTO: 10.8 FL (ref 6–12)
RBC # BLD AUTO: 4.49 10*6/MM3 (ref 3.77–5.28)
WBC NRBC COR # BLD: 7.82 10*3/MM3 (ref 3.4–10.8)

## 2022-11-16 PROCEDURE — 80053 COMPREHEN METABOLIC PANEL: CPT

## 2022-11-16 PROCEDURE — 85025 COMPLETE CBC W/AUTO DIFF WBC: CPT

## 2022-11-16 PROCEDURE — 36415 COLL VENOUS BLD VENIPUNCTURE: CPT

## 2022-11-16 PROCEDURE — 80061 LIPID PANEL: CPT

## 2022-11-17 LAB
ALBUMIN SERPL-MCNC: 4.2 G/DL (ref 3.5–5.2)
ALBUMIN/GLOB SERPL: 1.2 G/DL
ALP SERPL-CCNC: 82 U/L (ref 39–117)
ALT SERPL W P-5'-P-CCNC: 17 U/L (ref 1–33)
ANION GAP SERPL CALCULATED.3IONS-SCNC: 14.9 MMOL/L (ref 5–15)
AST SERPL-CCNC: 15 U/L (ref 1–32)
BILIRUB SERPL-MCNC: 0.3 MG/DL (ref 0–1.2)
BUN SERPL-MCNC: 11 MG/DL (ref 6–20)
BUN/CREAT SERPL: 12 (ref 7–25)
CALCIUM SPEC-SCNC: 9.4 MG/DL (ref 8.6–10.5)
CHLORIDE SERPL-SCNC: 96 MMOL/L (ref 98–107)
CHOLEST SERPL-MCNC: 202 MG/DL (ref 0–200)
CO2 SERPL-SCNC: 26.1 MMOL/L (ref 22–29)
CREAT SERPL-MCNC: 0.92 MG/DL (ref 0.57–1)
EGFRCR SERPLBLD CKD-EPI 2021: 81.4 ML/MIN/1.73
GLOBULIN UR ELPH-MCNC: 3.4 GM/DL
GLUCOSE SERPL-MCNC: 311 MG/DL (ref 65–99)
HDLC SERPL-MCNC: 42 MG/DL (ref 40–60)
LDLC SERPL CALC-MCNC: 129 MG/DL (ref 0–100)
LDLC/HDLC SERPL: 2.97 {RATIO}
POTASSIUM SERPL-SCNC: 3.7 MMOL/L (ref 3.5–5.2)
PROT SERPL-MCNC: 7.6 G/DL (ref 6–8.5)
SODIUM SERPL-SCNC: 137 MMOL/L (ref 136–145)
TRIGL SERPL-MCNC: 176 MG/DL (ref 0–150)
VLDLC SERPL-MCNC: 31 MG/DL (ref 5–40)

## 2022-11-18 ENCOUNTER — TELEPHONE (OUTPATIENT)
Dept: CARDIOLOGY | Facility: CLINIC | Age: 37
End: 2022-11-18

## 2022-11-18 NOTE — TELEPHONE ENCOUNTER
----- Message from Medhat Marinelli MD sent at 11/18/2022 10:05 AM EST -----  For diabetes patient's LDL is above goal.  Would recommend starting Crestor 10 mg nightly.

## 2022-11-23 RX ORDER — ROSUVASTATIN CALCIUM 10 MG/1
10 TABLET, COATED ORAL NIGHTLY
Qty: 90 TABLET | Refills: 3 | Status: SHIPPED | OUTPATIENT
Start: 2022-11-23

## 2022-12-01 DIAGNOSIS — J30.9 ALLERGIC RHINITIS, UNSPECIFIED SEASONALITY, UNSPECIFIED TRIGGER: ICD-10-CM

## 2022-12-01 RX ORDER — FLUTICASONE PROPIONATE 50 MCG
SPRAY, SUSPENSION (ML) NASAL
Qty: 48 ML | Refills: 1 | Status: SHIPPED | OUTPATIENT
Start: 2022-12-01

## 2022-12-01 NOTE — TELEPHONE ENCOUNTER
Rx Refill Note  Requested Prescriptions     Pending Prescriptions Disp Refills   • fluticasone (FLONASE) 50 MCG/ACT nasal spray [Pharmacy Med Name: FLUTICASONE PROP 50 MCG SPRAY] 48 mL 1     Si SPRAYS INTO THE NOSTRIL(S) AS DIRECTED BY PROVIDER DAILY      Last office visit with prescribing clinician: 2022   Last telemedicine visit with prescribing clinician: Visit date not found   Next office visit with prescribing clinician: Visit date not found                         Would you like a call back once the refill request has been completed: [] Yes [] No    If the office needs to give you a call back, can they leave a voicemail: [] Yes [] No    Alize Can LPN  22, 08:49 EST

## 2022-12-06 NOTE — PROGRESS NOTES
Sleep Clinic Follow Up Note    Chief Complaint  Follow-up    Subjective     History of Present Illness (from previous encounter on 8/22/2022):  36 y.o. female seen in consultation at the request of Roberta Rodriguez, * for evaluation of the above.      She describes longstanding history of snoring.  She also notes nonrestorative sleep/daytime somnolence.  Her [place] Sleepiness Scale is elevated at 18 currently.     She has frequent awakenings at night.  These can sometimes be as frequent as hourly.  She notes morning headaches and a dry mouth.  She denies any nocturnal hallucinations or RLS type symptoms.  She will take naps when she can.     P[place] Scale is: 18/24 (End copied text).    -A home sleep study was obtained on 9/7/2022 revealing mild obstructive sleep apnea.  PAP therapy was initiated.    Interval History:  Deanna Putnam is a 37 y.o. female returns for follow up and compliance of CPAP therapy. The patient was last seen on 8/2/2022. Overall the patient feels good with regard to therapy. The device appears to be working appropriately. On average the patient sleeps 3-5 hours per night. She wakes at 3-4 am frequently. She did not use the machine for a while due to dryness, and she has had some anxiety with the mask as well. She has a new mask and this has helped. The patient wakes 1-2 times per night.     The patient reports the following changes to their medical and medication history since they were last seen:  Started crestor  Further details are as follows:      P[place]mont Scale is (out of 24): Total score: 13     Weight:  Current Weight: 163 lb      The patient's relevant past medical, surgical, family, and social history reviewed and updated in Epic as appropriate.    PMH:    Past Medical History:   Diagnosis Date   • Allergic    • Anemia    • Asthma    • Depression    • Diabetes mellitus (HCC)    • Hypertension    • Obesity    • HASEEB (obstructive sleep apnea)    • Osteoarthritis of back    •  Prediabetes      Past Surgical History:   Procedure Laterality Date   • CHOLECYSTECTOMY     • DIAGNOSTIC LAPAROSCOPY  2018   • ENDOMETRIAL ABLATION      Did not get endometriosis out   • EYE SURGERY      eyelid lift   • GALLBLADDER SURGERY  2018   • TONSILLECTOMY     • WISDOM TOOTH EXTRACTION       OB History        0    Para   0    Term   0       0    AB   0    Living   1       SAB   0    IAB   0    Ectopic   0    Molar   0    Multiple   0    Live Births   0          Obstetric Comments   Have an adoptive daughter           Allergies   Allergen Reactions   • Eggs Or Egg-Derived Products Nausea And Vomiting   • Mold Extract [Trichophyton] Unknown (See Comments)     unknown       MEDS:  Prior to Admission medications    Medication Sig Start Date End Date Taking? Authorizing Provider   fluticasone (FLONASE) 50 MCG/ACT nasal spray 2 SPRAYS INTO THE NOSTRIL(S) AS DIRECTED BY PROVIDER DAILY 22   Roberta Rodriguez PA-C   amLODIPine (NORVASC) 10 MG tablet TAKE 1 TABLET BY MOUTH EVERY DAY 10/26/22   Roberta Rodriguez PA-C   cetirizine (zyrTEC) 10 MG tablet Take 1 tablet by mouth Daily. 22   Roberta Rodriguez PA-C   chlorthalidone (HYGROTEN) 50 MG tablet TAKE 1 TABLET BY MOUTH EVERY DAY 10/27/22   Roberta Rodriguez PA-C   Dulaglutide 0.75 MG/0.5ML solution pen-injector Inject 0.75 mg under the skin into the appropriate area as directed 1 (One) Time Per Week.  Patient taking differently: Inject 0.75 mg under the skin into the appropriate area as directed 1 (One) Time Per Week. Hasn't started yet 22   Roberta Rodriguez PA-C   lisinopril (PRINIVIL,ZESTRIL) 40 MG tablet TAKE 1 TABLET BY MOUTH EVERY DAY 22   Roberta Rodriguez PA-C   rosuvastatin (CRESTOR) 10 MG tablet Take 1 tablet by mouth Every Night. 22   Medhat Marinelli MD   Symbicort 160-4.5 MCG/ACT inhaler INHALE 2 PUFFS TWICE A DAY 10/2/22   Roberta Rodriguez PA-C  "        FH:  Family History   Problem Relation Age of Onset   • Endometrial cancer Mother 59   • Diabetes Mother    • Hypertension Mother    • Cancer Mother    • Hyperlipidemia Mother    • Miscarriages / Stillbirths Mother    • Vision loss Mother    • Breast cancer Maternal Grandmother 80   • Colon cancer Maternal Grandmother 90   • Arthritis Maternal Grandmother    • Birth defects Maternal Grandmother    • Diabetes Maternal Grandmother    • Cancer Maternal Grandmother    • Hyperlipidemia Maternal Grandmother    • Vision loss Maternal Grandmother    • Breast cancer Maternal Aunt 60   • Cancer Maternal Aunt    • Diabetes Father    • Hypertension Father    • Hyperlipidemia Father    • Diabetes Paternal Grandmother    • Hypertension Paternal Grandmother    • Hyperlipidemia Paternal Grandmother    • Hypertension Paternal Grandfather    • Diabetes Paternal Grandfather    • Hyperlipidemia Paternal Grandfather    • Uterine cancer Neg Hx    • Ovarian cancer Neg Hx        Objective   Vital Signs:  BP (!) 186/108   Pulse 107   Ht 160 cm (63\")   Wt 120 kg (263 lb 12.8 oz)   SpO2 98%   BMI 46.73 kg/m²           Physical Exam  Vitals reviewed.   Constitutional:       Appearance: Normal appearance.   HENT:      Head: Normocephalic and atraumatic.      Nose: Nose normal.      Mouth/Throat:      Mouth: Mucous membranes are moist.   Cardiovascular:      Rate and Rhythm: Normal rate and regular rhythm.      Heart sounds: No murmur heard.    No friction rub. No gallop.   Pulmonary:      Effort: Pulmonary effort is normal. No respiratory distress.      Breath sounds: Normal breath sounds. No wheezing or rhonchi.   Neurological:      Mental Status: She is alert and oriented to person, place, and time.   Psychiatric:         Behavior: Behavior normal.               CPAP Report:  AHI: 0.6/h  Days of Usage: 19/69 (20%)  Number of Days Greater than 4 hours: 16/69 (23%)  Average time (days used): 4 hours 21 minutes  95th Percentile " Pressure: 12.1 cm H2O  Settings CPAP-AutoSet minimum pressure 8 cm H2O, maximum pressure 18 cm H2O, EPR full-time, EPR level 3, response standard.      Result Review :              Assessment and Plan  Deanna Putnam is a 37 y.o. femalewho returns for follow-up compliance of PAP therapy. The patient reports that she is improving in her use of the device now that she has a new mask which is helping with dry mouth.  I discussed the need to continue PAP therapy, and she is motivated to continue.  Have also discussed long-acting melatonin for her difficulty sleeping.  She is worried that she may see over sleep and not be able to get her child to school in the morning.  She may try this over the holiday break.  Overall her AHI is 0.6/H which indicates good treatment of HASEEB.  Her greater than 4-hour usage unfortunately is at 23%.  She will return in 6 weeks for reevaluation and compliance check.    Diagnoses and all orders for this visit:    1. HASEEB (obstructive sleep apnea) (Primary)  -     PAP Therapy           The patient continues to use and benefit from CPAP therapy.    1. The patient was counseled regarding multimodal approach with healthy nutrition, healthy sleep, regular physical activity, social activities, counseling, and medications. Encouraged to practice lateral sleep position. Avoid alcohol and sedatives close to bedtime.     2.  We will refill supplies x1 year.  Return to clinic in 6 weeks. I have reviewed the results of my evaluation and impression and discussed my recommendations in detail with the patient.    Follow Up  Return in about 6 weeks (around 1/18/2023).  Patient was given instructions and counseling regarding her condition or for health maintenance advice. Please see specific information pulled into the AVS if appropriate.       REYNA Ambrosio, ACNP-BC  Pulmonology, Critical Care, and Sleep Medicine  Electronically signed by REYNA Gandhi, 12/06/22, 12:55 PM EST.

## 2022-12-07 ENCOUNTER — OFFICE VISIT (OUTPATIENT)
Dept: SLEEP MEDICINE | Facility: HOSPITAL | Age: 37
End: 2022-12-07

## 2022-12-07 VITALS
DIASTOLIC BLOOD PRESSURE: 108 MMHG | OXYGEN SATURATION: 98 % | BODY MASS INDEX: 46.74 KG/M2 | HEIGHT: 63 IN | WEIGHT: 263.8 LBS | HEART RATE: 107 BPM | SYSTOLIC BLOOD PRESSURE: 186 MMHG

## 2022-12-07 DIAGNOSIS — G47.33 OSA (OBSTRUCTIVE SLEEP APNEA): Primary | ICD-10-CM

## 2022-12-07 PROCEDURE — 99213 OFFICE O/P EST LOW 20 MIN: CPT | Performed by: NURSE PRACTITIONER

## 2023-01-16 NOTE — PROGRESS NOTES
Sleep Clinic Video Visit Follow Up Note    You have chosen to receive care through a telehealth visit.  Do you consent to use a video connection for your medical care today? Yes       Chief Complaint  Follow up and compliance of pap therapy    Subjective     History of Present Illness (from previous encounter on 12/7/2022):  Deanna Putnam is a 37 y.o. female who returns for follow-up and compliance of PAP therapy. The patient reports that she is improving in her use of the device now that she has a new mask which is helping with dry mouth.  I discussed the need to continue PAP therapy, and she is motivated to continue.  Have also discussed long-acting melatonin for her difficulty sleeping.  She is worried that she may see over sleep and not be able to get her child to school in the morning.  She may try this over the holiday break.  Overall her AHI is 0.6/H which indicates good treatment of HASEEB.  Her greater than 4-hour usage unfortunately is at 23%.  She will return in 6 weeks for reevaluation and compliance check.  (End copied text)    Interval History:  Deanna Putnam is a 37 y.o. female returns for follow up and compliance of CPAP therapy. The patient was last seen on 12/7/2022. Overall the patient feels good with regard to therapy. She is sleeping better. The device appears to be working appropriately. On average the patient sleeps 6 hours per night. The patient wake 1 time per night. The patient reports the following changes to their medical and medication history since they were last seen: Tired when she has her period which is heavy.    Further details are as follows:    [place] Scale is: 11/24    Weight:  Current Weight: 263 lb    Weight change in the last year:  consistent    The patient's relevant past medical, surgical, family, and social history reviewed and updated in Epic as appropriate.    PMH:    Past Medical History:   Diagnosis Date   • Allergic    • Anemia    • Asthma    •  Depression    • Diabetes mellitus (HCC)    • Hypertension    • Obesity    • HASEEB (obstructive sleep apnea)    • Osteoarthritis of back    • Prediabetes      Past Surgical History:   Procedure Laterality Date   • CHOLECYSTECTOMY     • DIAGNOSTIC LAPAROSCOPY  2018   • ENDOMETRIAL ABLATION      Did not get endometriosis out   • EYE SURGERY      eyelid lift   • GALLBLADDER SURGERY  2018   • TONSILLECTOMY     • WISDOM TOOTH EXTRACTION       OB History        0    Para   0    Term   0       0    AB   0    Living   1       SAB   0    IAB   0    Ectopic   0    Molar   0    Multiple   0    Live Births   0          Obstetric Comments   Have an adoptive daughter           Allergies   Allergen Reactions   • Eggs Or Egg-Derived Products Nausea And Vomiting   • Mold Extract [Trichophyton] Unknown (See Comments)     unknown       MEDS:  Prior to Admission medications    Medication Sig Start Date End Date Taking? Authorizing Provider   amLODIPine (NORVASC) 10 MG tablet TAKE 1 TABLET BY MOUTH EVERY DAY 10/26/22   Roberta Rodriguez PA-C   cetirizine (zyrTEC) 10 MG tablet Take 1 tablet by mouth Daily. 22   Roberta Rodriguez PA-C   chlorthalidone (HYGROTEN) 50 MG tablet TAKE 1 TABLET BY MOUTH EVERY DAY 10/27/22   Roberta Rodriguez PA-C   Dulaglutide 0.75 MG/0.5ML solution pen-injector Inject 0.75 mg under the skin into the appropriate area as directed 1 (One) Time Per Week.  Patient taking differently: Inject 0.5 mL under the skin into the appropriate area as directed 1 (One) Time Per Week. Hasn't started yet 22   Roberta Rodriguez PA-C   fluticasone (FLONASE) 50 MCG/ACT nasal spray 2 SPRAYS INTO THE NOSTRIL(S) AS DIRECTED BY PROVIDER DAILY 22   Roberta Rodriguez PA-C   lisinopril (PRINIVIL,ZESTRIL) 40 MG tablet TAKE 1 TABLET BY MOUTH EVERY DAY 22   Roberta Rodriguez PA-C   rosuvastatin (CRESTOR) 10 MG tablet Take 1 tablet by mouth Every Night. 22  "  Medhat Marinelli MD   Symbicort 160-4.5 MCG/ACT inhaler INHALE 2 PUFFS TWICE A DAY 10/2/22   Roberta Rodriguez PA-C         FH:  Family History   Problem Relation Age of Onset   • Endometrial cancer Mother 59   • Diabetes Mother    • Hypertension Mother    • Cancer Mother    • Hyperlipidemia Mother    • Miscarriages / Stillbirths Mother    • Vision loss Mother    • Breast cancer Maternal Grandmother 80   • Colon cancer Maternal Grandmother 90   • Arthritis Maternal Grandmother    • Birth defects Maternal Grandmother    • Diabetes Maternal Grandmother    • Cancer Maternal Grandmother    • Hyperlipidemia Maternal Grandmother    • Vision loss Maternal Grandmother    • Breast cancer Maternal Aunt 60   • Cancer Maternal Aunt    • Diabetes Father    • Hypertension Father    • Hyperlipidemia Father    • Diabetes Paternal Grandmother    • Hypertension Paternal Grandmother    • Hyperlipidemia Paternal Grandmother    • Hypertension Paternal Grandfather    • Diabetes Paternal Grandfather    • Hyperlipidemia Paternal Grandfather    • Uterine cancer Neg Hx    • Ovarian cancer Neg Hx        Objective   Vital Signs:  Ht 160 cm (62.99\")   Wt 119 kg (263 lb)   BMI 46.60 kg/m²           Physical Exam  Constitutional:       Appearance: Normal appearance.   Neurological:      Mental Status: She is alert and oriented to person, place, and time.   Psychiatric:         Mood and Affect: Mood normal.         Behavior: Behavior normal.         Thought Content: Thought content normal.               Result Review :           CPAP Report:  AHI: 0.4/h  Days of Usage: 24/30 (80%)  95th Percentile Pressure: 9.7 cm H2O  Number of Days Greater than 4 hours: 22/30 (73%)  Settings: Auto CPAP-8/18 cm H2O, EPR full-time, EPR level 3, response standard.       Assessment and Plan  Deanna Putnam is a 37 y.o. female who returns for follow-up compliance of PAP therapy.  Pap report has been reviewed.  Patient is in full compliance with " greater than 4-hour usage of 73% and overall usage at 80%.  She averages 4 hours 37 minutes on days used.  AHI is well-controlled at 0.4/H.  Patient has improved overall.  She continues to find benefit from CPAP and wishes to continue.  She does note having difficulty with hypersomnia particular around the time of her period.  She notes that menses is always heavy for her and she wonders if this might be related to it.  Certainly low iron, Hgb, and HCT can contribute to overall fatigue.  Recommend iron studies for further evaluation.    I will refill the patient's supplies, and they will return for follow-up and compliance in 1 year or sooner should they have further questions or concerns.      Diagnoses and all orders for this visit:    1. HASEEB (obstructive sleep apnea) (Primary)  -     PAP Therapy    2. Excessive daytime sleepiness    3. Obesity, morbid, BMI 40.0-49.9 (HCC)           The patient continues to use and benefit from CPAP therapy.    1. The patient was counseled regarding multimodal approach with healthy nutrition, healthy sleep, regular physical activity, social activities, counseling, and medications. Encouraged to practice lateral sleep position. Avoid alcohol and sedatives close to bedtime.     2.  We will refill supplies x1 year.  Return to clinic 1 year or sooner if symptoms warrant.  Patient gave verbal consent today for video visit. I have reviewed the results of my evaluation and impression and discussed my recommendations in detail with the patient.         Follow Up  Return in about 1 year (around 1/17/2024) for Annual visit.  Patient was given instructions and counseling regarding her condition or for health maintenance advice. Please see specific information pulled into the AVS if appropriate.       REYNA Ambrosio, ACNP-BC  Pulmonology, Critical Care, and Sleep Medicine

## 2023-01-17 ENCOUNTER — TELEMEDICINE (OUTPATIENT)
Dept: SLEEP MEDICINE | Facility: HOSPITAL | Age: 38
End: 2023-01-17
Payer: COMMERCIAL

## 2023-01-17 VITALS — HEIGHT: 63 IN | WEIGHT: 263 LBS | BODY MASS INDEX: 46.6 KG/M2

## 2023-01-17 DIAGNOSIS — G47.19 EXCESSIVE DAYTIME SLEEPINESS: ICD-10-CM

## 2023-01-17 DIAGNOSIS — G47.33 OSA (OBSTRUCTIVE SLEEP APNEA): Primary | ICD-10-CM

## 2023-01-17 DIAGNOSIS — E66.01 OBESITY, MORBID, BMI 40.0-49.9: ICD-10-CM

## 2023-01-17 PROCEDURE — 99213 OFFICE O/P EST LOW 20 MIN: CPT | Performed by: NURSE PRACTITIONER

## 2023-02-22 RX ORDER — CHLORTHALIDONE 50 MG/1
TABLET ORAL
Qty: 90 TABLET | Refills: 1 | Status: SHIPPED | OUTPATIENT
Start: 2023-02-22

## 2023-02-22 RX ORDER — AMLODIPINE BESYLATE 10 MG/1
TABLET ORAL
Qty: 90 TABLET | Refills: 1 | Status: SHIPPED | OUTPATIENT
Start: 2023-02-22

## 2023-02-22 NOTE — TELEPHONE ENCOUNTER
Rx Refill Note  Requested Prescriptions     Pending Prescriptions Disp Refills   • chlorthalidone (HYGROTEN) 50 MG tablet [Pharmacy Med Name: CHLORTHALIDONE 50 MG TABLET] 90 tablet 1     Sig: TAKE 1 TABLET BY MOUTH EVERY DAY   • amLODIPine (NORVASC) 10 MG tablet [Pharmacy Med Name: AMLODIPINE BESYLATE 10 MG TAB] 90 tablet 1     Sig: TAKE 1 TABLET BY MOUTH EVERY DAY      Last office visit with prescribing clinician: 5/26/2022   Last telemedicine visit with prescribing clinician: Visit date not found   Next office visit with prescribing clinician: Visit date not found                           Dejah Bishop RN  02/22/23, 15:36 EST

## 2023-03-20 DIAGNOSIS — M25.512 ACUTE PAIN OF LEFT SHOULDER: Primary | ICD-10-CM

## 2023-03-28 ENCOUNTER — OFFICE VISIT (OUTPATIENT)
Dept: ORTHOPEDIC SURGERY | Facility: CLINIC | Age: 38
End: 2023-03-28
Payer: COMMERCIAL

## 2023-03-28 VITALS
HEIGHT: 63 IN | WEIGHT: 241 LBS | DIASTOLIC BLOOD PRESSURE: 82 MMHG | SYSTOLIC BLOOD PRESSURE: 120 MMHG | BODY MASS INDEX: 42.7 KG/M2

## 2023-03-28 DIAGNOSIS — M25.512 ACUTE PAIN OF LEFT SHOULDER: Primary | ICD-10-CM

## 2023-03-28 DIAGNOSIS — M75.32 CALCIFIC TENDINITIS OF LEFT SHOULDER: ICD-10-CM

## 2023-03-28 PROCEDURE — 99203 OFFICE O/P NEW LOW 30 MIN: CPT | Performed by: ORTHOPAEDIC SURGERY

## 2023-03-28 NOTE — PROGRESS NOTES
[facility] [place] [facility] Clinic Note        Subjective     Pain of the Left Shoulder      HPI    Deanna Putnam is a 37 y.o. female who presents with new problem of: left shoulder pain.  Onset: atraumatic and gradual in nature. The issue has been ongoing for 2 week(s). Pain is a 10/10 on the pain scale. Pain is described as aching and shooting. Associated symptoms include pain and stiffness. The pain is worse with working and any movement of the joint; resting and ice improve the pain. Previous treatments have included: NSAIDS.    I have reviewed the following portions of the patient's history:History of Present Illness and review of systems.     Patient is here today for new Bromday regarding her left shoulder.  She is right-hand dominant.  She woke up 1 morning and felt that she had slept weird.  She had anterior shoulder pain.  She was able to function that day but things gradually worsened over the next 48 hours and she got to the point where the pain was severe enough that she went to the ER at [facility].  She was told that she had bursitis.  She says things have gradually improved over the last several days.  She now tells me she is about 100%.      Past Medical History:   Diagnosis Date   • Allergic    • Anemia    • Arthritis of back January 2011   • Asthma    • Bursitis of hip May 2022   • Depression    • Diabetes mellitus (HCC)    • Hypertension    • Obesity    • HASEEB (obstructive sleep apnea)    • Osteoarthritis of back    • Prediabetes       Past Surgical History:   Procedure Laterality Date   • CHOLECYSTECTOMY  2018   • DIAGNOSTIC LAPAROSCOPY  12/13/2018   • ENDOMETRIAL ABLATION  2018    Did not get endometriosis out   • EYE SURGERY  2015    eyelid lift   • GALLBLADDER SURGERY  04/2018   • TONSILLECTOMY     • WISDOM TOOTH EXTRACTION        Family History   Problem Relation Age of Onset   • Endometrial cancer Mother 59   • Diabetes Mother    • Hypertension Mother    • Cancer Mother    •  Hyperlipidemia Mother    • Miscarriages / Stillbirths Mother    • Vision loss Mother    • Breast cancer Maternal Grandmother 80   • Colon cancer Maternal Grandmother 90   • Arthritis Maternal Grandmother    • Birth defects Maternal Grandmother    • Diabetes Maternal Grandmother    • Cancer Maternal Grandmother    • Hyperlipidemia Maternal Grandmother    • Vision loss Maternal Grandmother    • Breast cancer Maternal Aunt 60   • Cancer Maternal Aunt    • Diabetes Father    • Hypertension Father    • Hyperlipidemia Father    • Diabetes Paternal Grandmother    • Hypertension Paternal Grandmother    • Hyperlipidemia Paternal Grandmother    • Hypertension Paternal Grandfather    • Diabetes Paternal Grandfather    • Hyperlipidemia Paternal Grandfather    • Uterine cancer Neg Hx    • Ovarian cancer Neg Hx      Social History     Socioeconomic History   • Marital status: Single   Tobacco Use   • Smoking status: Never   • Smokeless tobacco: Never   Vaping Use   • Vaping Use: Never used   Substance and Sexual Activity   • Alcohol use: Yes     Alcohol/week: 2.0 standard drinks     Types: 2 Glasses of wine per week     Comment: rarely   • Drug use: No   • Sexual activity: Not Currently     Partners: Female, Male     Birth control/protection: Condom      Current Outpatient Medications on File Prior to Visit   Medication Sig Dispense Refill   • amLODIPine (NORVASC) 10 MG tablet TAKE 1 TABLET BY MOUTH EVERY DAY 90 tablet 1   • cetirizine (zyrTEC) 10 MG tablet Take 1 tablet by mouth Daily. 90 tablet 1   • chlorthalidone (HYGROTEN) 50 MG tablet TAKE 1 TABLET BY MOUTH EVERY DAY 90 tablet 1   • diclofenac (VOLTAREN) 50 MG EC tablet TAKE 1 TABLET (50 MG TOTAL) BY MOUTH 3 (THREE) TIMES DAILY AS NEEDED FOR UP TO 5 DAYS.     • fluticasone (FLONASE) 50 MCG/ACT nasal spray 2 SPRAYS INTO THE NOSTRIL(S) AS DIRECTED BY PROVIDER DAILY 48 mL 1   • lisinopril (PRINIVIL,ZESTRIL) 40 MG tablet TAKE 1 TABLET BY MOUTH EVERY DAY 90 tablet 1   •  "rosuvastatin (CRESTOR) 10 MG tablet Take 1 tablet by mouth Every Night. 90 tablet 3   • Symbicort 160-4.5 MCG/ACT inhaler INHALE 2 PUFFS TWICE A DAY 10.2 each 3   • [DISCONTINUED] Dulaglutide 0.75 MG/0.5ML solution pen-injector Inject 0.75 mg under the skin into the appropriate area as directed 1 (One) Time Per Week. (Patient taking differently: Inject 0.5 mL under the skin into the appropriate area as directed 1 (One) Time Per Week. Hasn't started yet) 1 pen 5     No current facility-administered medications on file prior to visit.      Allergies   Allergen Reactions   • Eggs Or Egg-Derived Products Nausea And Vomiting   • Mold Extract [Trichophyton] Unknown (See Comments)     unknown          Review of Systems   Constitutional: Negative.    HENT: Negative.    Eyes: Negative.    Respiratory: Negative.    Cardiovascular: Negative.    Gastrointestinal: Negative.    Endocrine: Negative.    Genitourinary: Negative.    Musculoskeletal: Positive for arthralgias.   Skin: Negative.    Allergic/Immunologic: Negative.    Neurological: Negative.    Hematological: Negative.    Psychiatric/Behavioral: Negative.         I reviewed the patient's chief complaint, history of present illness, review of systems, past medical history, surgical history, family history, social history, medications and allergy list.        Objective      Physical Exam  /82   Ht 160 cm (62.99\")   Wt 109 kg (241 lb)   BMI 42.70 kg/m²     Body mass index is 42.7 kg/m².    General  Mental Status - alert  General Appearance - cooperative, well groomed, not in acute distress  Orientation - Oriented X3  Build & Nutrition - well developed and well nourished  Posture - normal posture  Gait - normal gait       Ortho Exam  Musculoskeletal   Upper Extremity   Left Shoulder       Strength and Tone:    Supraspinatus -5 out of 5 with pain    External Rotators-5/5 with mild pain    Infraspinatus - 5/5    Subscapularis - 5/5    Deltoid - 5/5     Range of " Motion      Left Shoulder:    Internal Rotation: ROM - L4    External Rotation: AROM - 70 degrees    Elevation through flexion: AROM - 140 degrees     AC joint:  non tender to palpation    AC joint:  negative crossover          Imaging/Studies  Imaging Results (Last 24 Hours)     ** No results found for the last 24 hours. **        We have reviewed images and report of an x-ray of the patient's left shoulder from [facility].  She has brought the disc for us to review the images.  We have the report on epic.  This is read as possible calcific tendinopathy.  She also has a type II acromion.  On one of the AP views, there is a small calcific density adjacent to the greater tuberosity and she does have some calcification seen on the scapular Y view in the region of the rotator cuff.    Assessment    Assessment:  1. Acute pain of left shoulder    2. Calcific tendinitis of left shoulder        Plan:  1. Continue over-the-counter medication as needed for discomfort  2. Calcific tendinopathy left shoulder--currently patient seems to be doing quite well.  I would like to reassess her in about 3 weeks and if she has recurrence of her symptoms, consider modalities versus PT.  For now I think she is going to be okay but we will recheck her in 3 weeks just to verify.        Jose Johnson MD  03/28/23  09:07 EDT      Dictated Utilizing Dragon Dictation.

## 2023-04-13 RX ORDER — BUDESONIDE AND FORMOTEROL FUMARATE DIHYDRATE 160; 4.5 UG/1; UG/1
AEROSOL RESPIRATORY (INHALATION)
Qty: 10.2 EACH | Refills: 3 | Status: SHIPPED | OUTPATIENT
Start: 2023-04-13

## 2023-04-13 NOTE — TELEPHONE ENCOUNTER
Rx Refill Note  Requested Prescriptions     Pending Prescriptions Disp Refills   • Symbicort 160-4.5 MCG/ACT inhaler [Pharmacy Med Name: SYMBICORT 160-4.5 MCG INHALER] 10.2 each 3     Sig: TAKE 2 PUFFS BY MOUTH TWICE A DAY      Last office visit with prescribing clinician: 5/26/2022   Last telemedicine visit with prescribing clinician: Visit date not found   Next office visit with prescribing clinician: Visit date not found                         Would you like a call back once the refill request has been completed: [] Yes [] No    If the office needs to give you a call back, can they leave a voicemail: [] Yes [] No    Alize Can LPN  04/13/23, 07:58 EDT

## 2023-05-15 RX ORDER — CETIRIZINE HYDROCHLORIDE 10 MG/1
TABLET ORAL
Qty: 90 TABLET | Refills: 1 | Status: SHIPPED | OUTPATIENT
Start: 2023-05-15

## 2023-06-13 DIAGNOSIS — J30.9 ALLERGIC RHINITIS, UNSPECIFIED SEASONALITY, UNSPECIFIED TRIGGER: ICD-10-CM

## 2023-06-13 RX ORDER — FLUTICASONE PROPIONATE 50 MCG
SPRAY, SUSPENSION (ML) NASAL
Qty: 48 ML | Refills: 1 | Status: SHIPPED | OUTPATIENT
Start: 2023-06-13

## 2023-08-24 ENCOUNTER — LAB (OUTPATIENT)
Dept: LAB | Facility: HOSPITAL | Age: 38
End: 2023-08-24
Payer: COMMERCIAL

## 2023-08-24 ENCOUNTER — OFFICE VISIT (OUTPATIENT)
Dept: INTERNAL MEDICINE | Facility: CLINIC | Age: 38
End: 2023-08-24
Payer: COMMERCIAL

## 2023-08-24 VITALS
HEART RATE: 96 BPM | HEIGHT: 63 IN | WEIGHT: 249.2 LBS | BODY MASS INDEX: 44.16 KG/M2 | SYSTOLIC BLOOD PRESSURE: 158 MMHG | TEMPERATURE: 97.8 F | DIASTOLIC BLOOD PRESSURE: 102 MMHG

## 2023-08-24 DIAGNOSIS — E55.9 VITAMIN D DEFICIENCY: ICD-10-CM

## 2023-08-24 DIAGNOSIS — E78.2 MIXED HYPERLIPIDEMIA: ICD-10-CM

## 2023-08-24 DIAGNOSIS — E11.9 TYPE 2 DIABETES MELLITUS WITHOUT COMPLICATION, WITHOUT LONG-TERM CURRENT USE OF INSULIN: ICD-10-CM

## 2023-08-24 DIAGNOSIS — E11.9 DIABETIC EYE EXAM: ICD-10-CM

## 2023-08-24 DIAGNOSIS — E11.65 TYPE 2 DIABETES MELLITUS WITH HYPERGLYCEMIA, WITHOUT LONG-TERM CURRENT USE OF INSULIN: ICD-10-CM

## 2023-08-24 DIAGNOSIS — E66.01 MORBID OBESITY WITH BODY MASS INDEX (BMI) OF 40.0 TO 49.9: ICD-10-CM

## 2023-08-24 DIAGNOSIS — I10 ESSENTIAL HYPERTENSION: ICD-10-CM

## 2023-08-24 DIAGNOSIS — J30.9 ALLERGIC RHINITIS, UNSPECIFIED SEASONALITY, UNSPECIFIED TRIGGER: ICD-10-CM

## 2023-08-24 DIAGNOSIS — Z01.00 DIABETIC EYE EXAM: ICD-10-CM

## 2023-08-24 DIAGNOSIS — R13.10 DYSPHAGIA, UNSPECIFIED TYPE: ICD-10-CM

## 2023-08-24 DIAGNOSIS — Z00.00 ROUTINE MEDICAL EXAM: Primary | ICD-10-CM

## 2023-08-24 DIAGNOSIS — Z13.220 LIPID SCREENING: ICD-10-CM

## 2023-08-24 DIAGNOSIS — F33.42 RECURRENT MAJOR DEPRESSIVE DISORDER, IN FULL REMISSION: ICD-10-CM

## 2023-08-24 DIAGNOSIS — Z12.31 ENCOUNTER FOR SCREENING MAMMOGRAM FOR MALIGNANT NEOPLASM OF BREAST: ICD-10-CM

## 2023-08-24 DIAGNOSIS — Z91.199 MEDICALLY NONCOMPLIANT: ICD-10-CM

## 2023-08-24 LAB
25(OH)D3 SERPL-MCNC: 15.5 NG/ML (ref 30–100)
ALBUMIN SERPL-MCNC: 4.3 G/DL (ref 3.5–5.2)
ALBUMIN/GLOB SERPL: 1.3 G/DL
ALP SERPL-CCNC: 87 U/L (ref 39–117)
ALT SERPL W P-5'-P-CCNC: 20 U/L (ref 1–33)
ANION GAP SERPL CALCULATED.3IONS-SCNC: 12.4 MMOL/L (ref 5–15)
AST SERPL-CCNC: 17 U/L (ref 1–32)
BILIRUB SERPL-MCNC: 0.4 MG/DL (ref 0–1.2)
BUN SERPL-MCNC: 12 MG/DL (ref 6–20)
BUN/CREAT SERPL: 15.6 (ref 7–25)
CALCIUM SPEC-SCNC: 9.5 MG/DL (ref 8.6–10.5)
CHLORIDE SERPL-SCNC: 96 MMOL/L (ref 98–107)
CHOLEST SERPL-MCNC: 192 MG/DL (ref 0–200)
CO2 SERPL-SCNC: 27.6 MMOL/L (ref 22–29)
CREAT SERPL-MCNC: 0.77 MG/DL (ref 0.57–1)
EGFRCR SERPLBLD CKD-EPI 2021: 100.8 ML/MIN/1.73
EXPIRATION DATE: NORMAL
GLOBULIN UR ELPH-MCNC: 3.3 GM/DL
GLUCOSE SERPL-MCNC: 241 MG/DL (ref 65–99)
HBA1C MFR BLD: 10.2 %
HDLC SERPL-MCNC: 45 MG/DL (ref 40–60)
LDLC SERPL CALC-MCNC: 124 MG/DL (ref 0–100)
LDLC/HDLC SERPL: 2.7 {RATIO}
Lab: NORMAL
POTASSIUM SERPL-SCNC: 3.9 MMOL/L (ref 3.5–5.2)
PROT SERPL-MCNC: 7.6 G/DL (ref 6–8.5)
SODIUM SERPL-SCNC: 136 MMOL/L (ref 136–145)
TRIGL SERPL-MCNC: 127 MG/DL (ref 0–150)
VIT B12 BLD-MCNC: 432 PG/ML (ref 211–946)
VLDLC SERPL-MCNC: 23 MG/DL (ref 5–40)

## 2023-08-24 PROCEDURE — 80050 GENERAL HEALTH PANEL: CPT

## 2023-08-24 PROCEDURE — 82607 VITAMIN B-12: CPT

## 2023-08-24 PROCEDURE — 82306 VITAMIN D 25 HYDROXY: CPT

## 2023-08-24 PROCEDURE — 80061 LIPID PANEL: CPT

## 2023-08-24 PROCEDURE — 84439 ASSAY OF FREE THYROXINE: CPT

## 2023-08-24 PROCEDURE — 84481 FREE ASSAY (FT-3): CPT

## 2023-08-24 RX ORDER — CETIRIZINE HYDROCHLORIDE 10 MG/1
10 TABLET ORAL DAILY
Qty: 90 TABLET | Refills: 1 | Status: SHIPPED | OUTPATIENT
Start: 2023-08-24

## 2023-08-24 RX ORDER — AMLODIPINE BESYLATE 10 MG/1
10 TABLET ORAL DAILY
Qty: 90 TABLET | Refills: 1 | Status: SHIPPED | OUTPATIENT
Start: 2023-08-24

## 2023-08-24 RX ORDER — LISINOPRIL 40 MG/1
40 TABLET ORAL DAILY
Qty: 90 TABLET | Refills: 1 | Status: SHIPPED | OUTPATIENT
Start: 2023-08-24

## 2023-08-24 RX ORDER — CHLORTHALIDONE 50 MG/1
50 TABLET ORAL DAILY
Qty: 90 TABLET | Refills: 1 | Status: SHIPPED | OUTPATIENT
Start: 2023-08-24

## 2023-08-24 RX ORDER — FLUTICASONE PROPIONATE 50 MCG
2 SPRAY, SUSPENSION (ML) NASAL DAILY
Qty: 48 ML | Refills: 1 | Status: SHIPPED | OUTPATIENT
Start: 2023-08-24

## 2023-08-24 RX ORDER — ROSUVASTATIN CALCIUM 10 MG/1
10 TABLET, COATED ORAL NIGHTLY
Qty: 90 TABLET | Refills: 3 | Status: SHIPPED | OUTPATIENT
Start: 2023-08-24

## 2023-08-24 RX ORDER — ESCITALOPRAM OXALATE 10 MG/1
10 TABLET ORAL DAILY
Qty: 90 TABLET | Refills: 1 | Status: SHIPPED | OUTPATIENT
Start: 2023-08-24

## 2023-08-24 NOTE — PROGRESS NOTES
"[facility] Internal Medicine    Deanna Putnam  1985   [id number]      Patient Care Team:  Roberta Rodriguez PA-C as PCP - General (Internal Medicine)  Leann Alejo DO (Inactive) as Consulting Physician (Obstetrics and Gynecology)  Jorge Alberto Arellano MD as Consulting Physician (Obstetrics and Gynecology)  Mentzer, Elizabeth Kathleen, PA (Physician Assistant)  Nolan Biswas MD as Consulting Physician (Gastroenterology)  Medhat Marinelli MD as Consulting Physician (Cardiology)  Luis Wolf MD as Consulting Physician (Pulmonary Disease)    Chief Complaint::   Chief Complaint   Patient presents with    Annual Exam    Hypertension          SHABBIR Huerta is a 37-year-old female date of birth 1985 who presents today for routine annual exam.  Past medical history significant for uncontrolled type 2 diabetes, morbid obesity, hypertension, vitamin D deficiency, and anxiety and depression. She is a  with [employer]. Full time, she works 8am -5pm.  Daughter is 7 years old. She is getting ready to move to a new town home and is moving on September 8th.   She is pursuing masters in family and marriage therapy.    Deanna has struggled with compliance. She has gradually lost weight, unsure why.  She is diabetic, was on trulicity, stopped when it ran out.  She has taken amlodipine over the past couple nights.  Has not taken lisinopril.  Worsening depression and anxiety. Took wellbutrin, but felt \"chalky\" medication taste and discontinued altogether.    Today, she denies chest pain, shortness of breath, palpitations, or headaches.  Pap smear is obtained through gynecology.      Patient Active Problem List   Diagnosis    Intramural and subserous leiomyoma of uterus    Essential hypertension    Morbid obesity with body mass index (BMI) of 40.0 to 49.9    Recurrent major depressive disorder, in full remission    Mild intermittent asthma without " complication    PCOS (polycystic ovarian syndrome)    Arthralgia of both knees    Screening, lipid    Bloating    Menorrhagia with regular cycle    Hip pain, bilateral    Vitamin D deficiency    Hypertensive emergency    Hyponatremia    Medically noncompliant    Anemia, iron deficiency    Diabetes    Allergic rhinitis    Left hip pain    Physical deconditioning    Ankle edema, bilateral    HASEEB (obstructive sleep apnea) - possible    Excessive daytime sleepiness    Routine medical exam    Mixed hyperlipidemia    Diabetic eye exam    Encounter for screening mammogram for malignant neoplasm of breast        Past Medical History:   Diagnosis Date    Allergic     Anemia     Arthritis of back January 2011    Asthma     Bursitis of hip May 2022    Depression     Diabetes mellitus     Hypertension     Obesity     HASEEB (obstructive sleep apnea)     Osteoarthritis of back     Prediabetes        Past Surgical History:   Procedure Laterality Date    CHOLECYSTECTOMY  2018    DIAGNOSTIC LAPAROSCOPY  12/13/2018    ENDOMETRIAL ABLATION  2018    Did not get endometriosis out    EYE SURGERY  2015    eyelid lift    GALLBLADDER SURGERY  04/2018    TONSILLECTOMY      WISDOM TOOTH EXTRACTION         Family History   Problem Relation Age of Onset    Endometrial cancer Mother 59    Diabetes Mother     Hypertension Mother     Cancer Mother     Hyperlipidemia Mother     Miscarriages / Stillbirths Mother     Vision loss Mother     Breast cancer Maternal Grandmother 80    Colon cancer Maternal Grandmother 90    Arthritis Maternal Grandmother     Birth defects Maternal Grandmother     Diabetes Maternal Grandmother     Cancer Maternal Grandmother     Hyperlipidemia Maternal Grandmother     Vision loss Maternal Grandmother     Breast cancer Maternal Aunt 60    Cancer Maternal Aunt     Diabetes Father     Hypertension Father     Hyperlipidemia Father     Diabetes Paternal Grandmother     Hypertension Paternal Grandmother     Hyperlipidemia Paternal  Grandmother     Hypertension Paternal Grandfather     Diabetes Paternal Grandfather     Hyperlipidemia Paternal Grandfather     Uterine cancer Neg Hx     Ovarian cancer Neg Hx        Social History     Socioeconomic History    Marital status: Single   Tobacco Use    Smoking status: Never    Smokeless tobacco: Never   Vaping Use    Vaping Use: Never used   Substance and Sexual Activity    Alcohol use: Yes     Alcohol/week: 2.0 standard drinks     Types: 2 Glasses of wine per week     Comment: rarely    Drug use: No    Sexual activity: Not Currently     Partners: Female, Male     Birth control/protection: Condom       Allergies   Allergen Reactions    Eggs Or Egg-Derived Products Nausea And Vomiting    Mold Extract [Trichophyton] Unknown (See Comments)     unknown       Review of Systems   Constitutional:  Positive for unexpected weight loss. Negative for activity change, appetite change, diaphoresis, fatigue and unexpected weight gain.   HENT:  Positive for trouble swallowing. Negative for hearing loss.    Eyes:  Negative for visual disturbance.   Respiratory:  Negative for chest tightness and shortness of breath.    Cardiovascular:  Negative for chest pain, palpitations and leg swelling.   Gastrointestinal:  Negative for abdominal pain, blood in stool, GERD and indigestion.   Endocrine: Negative for cold intolerance and heat intolerance.   Genitourinary:  Negative for dysuria and hematuria.   Musculoskeletal:  Negative for arthralgias and myalgias.   Skin:  Negative for skin lesions.   Neurological:  Negative for tremors, seizures, syncope, speech difficulty, weakness, headache, memory problem and confusion.   Hematological:  Does not bruise/bleed easily.   Psychiatric/Behavioral:  Positive for depressed mood. Negative for sleep disturbance. The patient is nervous/anxious.       Vital Signs  Vitals:    08/24/23 1026   BP: (!) 158/102   BP Location: Right arm   Patient Position: Sitting   Cuff Size: Adult   Pulse: 96  "  Temp: 97.8 øF (36.6 øC)   TempSrc: Infrared   Weight: 113 kg (249 lb 3.2 oz)   Height: 160 cm (62.99\")   PainSc: 0-No pain     Body mass index is 44.16 kg/mý.  Class 3 Severe Obesity (BMI >=40). Obesity-related health conditions include the following: hypertension.    . BMI is is above average; BMI management plan is completed. We discussed recent unexplained weight loss.     Advance Care Planning   ACP discussion was held with the patient during this visit. Patient does not have an advance directive, information provided.       Current Outpatient Medications:     amLODIPine (NORVASC) 10 MG tablet, Take 1 tablet by mouth Daily., Disp: 90 tablet, Rfl: 1    cetirizine (zyrTEC) 10 MG tablet, Take 1 tablet by mouth Daily., Disp: 90 tablet, Rfl: 1    chlorthalidone (HYGROTEN) 50 MG tablet, Take 1 tablet by mouth Daily., Disp: 90 tablet, Rfl: 1    fluticasone (FLONASE) 50 MCG/ACT nasal spray, 2 sprays into the nostril(s) as directed by provider Daily., Disp: 48 mL, Rfl: 1    lisinopril (PRINIVIL,ZESTRIL) 40 MG tablet, Take 1 tablet by mouth Daily., Disp: 90 tablet, Rfl: 1    Symbicort 160-4.5 MCG/ACT inhaler, TAKE 2 PUFFS BY MOUTH TWICE A DAY, Disp: 10.2 each, Rfl: 3    empagliflozin (Jardiance) 10 MG tablet tablet, Take 1 tablet by mouth Daily., Disp: 30 tablet, Rfl: 2    escitalopram (Lexapro) 10 MG tablet, Take 1 tablet by mouth Daily., Disp: 90 tablet, Rfl: 1    rosuvastatin (CRESTOR) 20 MG tablet, Take 1 tablet by mouth Every Night., Disp: 30 tablet, Rfl: 6    Physical Exam  Vitals reviewed.   Constitutional:       Appearance: Normal appearance. She is well-developed. She is obese.   HENT:      Head: Normocephalic and atraumatic.      Right Ear: Hearing, tympanic membrane, ear canal and external ear normal.      Left Ear: Hearing, tympanic membrane, ear canal and external ear normal.      Nose: Nose normal.      Mouth/Throat:      Mouth: Mucous membranes are moist.      Pharynx: Oropharynx is clear. Uvula midline. "   Eyes:      General: Lids are normal.      Conjunctiva/sclera: Conjunctivae normal.      Pupils: Pupils are equal, round, and reactive to light.   Cardiovascular:      Rate and Rhythm: Normal rate and regular rhythm.      Pulses: Normal pulses.      Heart sounds: Normal heart sounds.   Pulmonary:      Effort: Pulmonary effort is normal.      Breath sounds: Normal breath sounds.   Chest:   Breasts:     Right: Normal.      Left: Normal.   Abdominal:      General: Bowel sounds are normal.      Palpations: Abdomen is soft.   Musculoskeletal:         General: Normal range of motion.      Cervical back: Full passive range of motion without pain, normal range of motion and neck supple. Rigidity and tenderness present.   Feet:      Right foot:      Protective Sensation: 10 sites tested.  10 sites sensed.      Skin integrity: Skin integrity normal.      Left foot:      Protective Sensation: 10 sites tested.  10 sites sensed.      Skin integrity: Skin integrity normal.   Skin:     General: Skin is warm and dry.   Neurological:      General: No focal deficit present.      Mental Status: She is alert and oriented to person, place, and time. Mental status is at baseline.      Deep Tendon Reflexes: Reflexes are normal and symmetric.   Psychiatric:         Speech: Speech normal.         Behavior: Behavior normal.         Thought Content: Thought content normal.         Judgment: Judgment normal.        ACE III MINI            Results Review:    Recent Results (from the past 672 hour(s))   POC Glycosylated Hemoglobin (Hb A1C)    Collection Time: 08/24/23 11:31 AM    Specimen: Blood   Result Value Ref Range    Hemoglobin A1C 10.2 %    Lot Number [id number]     Expiration Date 4/13/25    Comprehensive Metabolic Panel    Collection Time: 08/24/23 11:35 AM    Specimen: Blood   Result Value Ref Range    Glucose 241 (H) 65 - 99 mg/dL    BUN 12 6 - 20 mg/dL    Creatinine 0.77 0.57 - 1.00 mg/dL    Sodium 136 136 - 145 mmol/L    Potassium  3.9 3.5 - 5.2 mmol/L    Chloride 96 (L) 98 - 107 mmol/L    CO2 27.6 22.0 - 29.0 mmol/L    Calcium 9.5 8.6 - 10.5 mg/dL    Total Protein 7.6 6.0 - 8.5 g/dL    Albumin 4.3 3.5 - 5.2 g/dL    ALT (SGPT) 20 1 - 33 U/L    AST (SGOT) 17 1 - 32 U/L    Alkaline Phosphatase 87 39 - 117 U/L    Total Bilirubin 0.4 0.0 - 1.2 mg/dL    Globulin 3.3 gm/dL    A/G Ratio 1.3 g/dL    BUN/Creatinine Ratio 15.6 7.0 - 25.0    Anion Gap 12.4 5.0 - 15.0 mmol/L    eGFR 100.8 >60.0 mL/min/1.73   Lipid Panel    Collection Time: 08/24/23 11:35 AM    Specimen: Blood   Result Value Ref Range    Total Cholesterol 192 0 - 200 mg/dL    Triglycerides 127 0 - 150 mg/dL    HDL Cholesterol 45 40 - 60 mg/dL    LDL Cholesterol  124 (H) 0 - 100 mg/dL    VLDL Cholesterol 23 5 - 40 mg/dL    LDL/HDL Ratio 2.70    TSH    Collection Time: 08/24/23 11:35 AM    Specimen: Blood   Result Value Ref Range    TSH 0.961 0.270 - 4.200 uIU/mL   T4, Free    Collection Time: 08/24/23 11:35 AM    Specimen: Blood   Result Value Ref Range    Free T4 1.38 0.93 - 1.70 ng/dL   T3, Free    Collection Time: 08/24/23 11:35 AM    Specimen: Blood   Result Value Ref Range    T3, Free 3.28 2.00 - 4.40 pg/mL   Vitamin B12    Collection Time: 08/24/23 11:35 AM    Specimen: Blood   Result Value Ref Range    Vitamin B-12 432 211 - 946 pg/mL   Vitamin D,25-Hydroxy    Collection Time: 08/24/23 11:35 AM    Specimen: Blood   Result Value Ref Range    25 Hydroxy, Vitamin D 15.5 (L) 30.0 - 100.0 ng/ml   CBC Auto Differential    Collection Time: 08/24/23 11:35 AM    Specimen: Blood   Result Value Ref Range    WBC 8.10 3.40 - 10.80 10*3/mm3    RBC 4.55 3.77 - 5.28 10*6/mm3    Hemoglobin 11.0 (L) 12.0 - 15.9 g/dL    Hematocrit 35.1 34.0 - 46.6 %    MCV 77.1 (L) 79.0 - 97.0 fL    MCH 24.2 (L) 26.6 - 33.0 pg    MCHC 31.3 (L) 31.5 - 35.7 g/dL    RDW 15.1 12.3 - 15.4 %    RDW-SD 41.1 37.0 - 54.0 fl    MPV 10.8 6.0 - 12.0 fL    Platelets 484 (H) 140 - 450 10*3/mm3    Neutrophil % 57.2 42.7 - 76.0 %     Lymphocyte % 34.3 19.6 - 45.3 %    Monocyte % 6.8 5.0 - 12.0 %    Eosinophil % 0.7 0.3 - 6.2 %    Basophil % 0.6 0.0 - 1.5 %    Immature Grans % 0.4 0.0 - 0.5 %    Neutrophils, Absolute 4.63 1.70 - 7.00 10*3/mm3    Lymphocytes, Absolute 2.78 0.70 - 3.10 10*3/mm3    Monocytes, Absolute 0.55 0.10 - 0.90 10*3/mm3    Eosinophils, Absolute 0.06 0.00 - 0.40 10*3/mm3    Basophils, Absolute 0.05 0.00 - 0.20 10*3/mm3    Immature Grans, Absolute 0.03 0.00 - 0.05 10*3/mm3    nRBC 0.0 0.0 - 0.2 /100 WBC     Procedures    Medication Review: Medications reviewed and noted    Social History     Socioeconomic History    Marital status: Single   Tobacco Use    Smoking status: Never    Smokeless tobacco: Never   Vaping Use    Vaping Use: Never used   Substance and Sexual Activity    Alcohol use: Yes     Alcohol/week: 2.0 standard drinks     Types: 2 Glasses of wine per week     Comment: rarely    Drug use: No    Sexual activity: Not Currently     Partners: Female, Male     Birth control/protection: Condom        Assessment/Plan:    Diagnoses and all orders for this visit:    1. Routine medical exam (Primary)  Overview:  eDanna struggles with compliance.  Long discussion today regarding importance of taking medications daily.  Fasting labs today.  Restart medications immediately.  Order for MMG placed.  Close follow up warranted.      2. Vitamin D deficiency  -     Vitamin D,25-Hydroxy; Future    3. Morbid obesity with body mass index (BMI) of 40.0 to 49.9  -     TSH; Future  -     T4, Free; Future  -     T3, Free; Future  -     Vitamin B12; Future    4. Essential hypertension  Overview:  Resume amlodipine 10 mg and lisinopril 40 mg daily.    Orders:  -     CBC & Differential; Future  -     Comprehensive Metabolic Panel; Future  -     amLODIPine (NORVASC) 10 MG tablet; Take 1 tablet by mouth Daily.  Dispense: 90 tablet; Refill: 1  -     chlorthalidone (HYGROTEN) 50 MG tablet; Take 1 tablet by mouth Daily.  Dispense: 90 tablet; Refill:  1  -     lisinopril (PRINIVIL,ZESTRIL) 40 MG tablet; Take 1 tablet by mouth Daily.  Dispense: 90 tablet; Refill: 1    5. Mixed hyperlipidemia  -     Lipid Panel; Future    6. Allergic rhinitis, unspecified seasonality, unspecified trigger  -     cetirizine (zyrTEC) 10 MG tablet; Take 1 tablet by mouth Daily.  Dispense: 90 tablet; Refill: 1  -     fluticasone (FLONASE) 50 MCG/ACT nasal spray; 2 sprays into the nostril(s) as directed by provider Daily.  Dispense: 48 mL; Refill: 1    7. Dysphagia, unspecified type  -     US Thyroid; Future    8. Encounter for screening mammogram for malignant neoplasm of breast  -     Mammo Screening Digital Tomosynthesis Bilateral With CAD; Future    9. Medically noncompliant    10. Type 2 diabetes mellitus with hyperglycemia, without long-term current use of insulin  Overview:  Noncompliant with medications.  Point-of-care A1c in the office today 10.2%.  She will resume Jardiance 10 mg immediately.  Intolerance to metformin.  Close follow-up warranted.      Orders:  -     Comprehensive Metabolic Panel; Future  -     POC Glycosylated Hemoglobin (Hb A1C)  -     empagliflozin (Jardiance) 10 MG tablet tablet; Take 1 tablet by mouth Daily.  Dispense: 30 tablet; Refill: 2    11. Recurrent major depressive disorder, in full remission  Overview:  Start lexapro 10mg daily.    Orders:  -     escitalopram (Lexapro) 10 MG tablet; Take 1 tablet by mouth Daily.  Dispense: 90 tablet; Refill: 1    12. Diabetic eye exam  -     Ambulatory Referral to Ophthalmology    Other orders  -     Discontinue: rosuvastatin (CRESTOR) 10 MG tablet; Take 1 tablet by mouth Every Night.  Dispense: 90 tablet; Refill: 3         Patient Instructions   BMI for Adults  What is BMI?  Body mass index (BMI) is a number that is calculated from a person's weight and height. BMI can help estimate how much of a person's weight is composed of fat. BMI does not measure body fat directly. Rather, it is an alternative to procedures  "that directly measure body fat, which can be difficult and expensive.  BMI can help identify people who may be at higher risk for certain medical problems.  What are BMI measurements used for?  BMI is used as a screening tool to identify possible weight problems. It helps determine whether a person is obese, overweight, a healthy weight, or underweight.  BMI is useful for:  Identifying a weight problem that may be related to a medical condition or may increase the risk for medical problems.  Promoting changes, such as changes in diet and exercise, to help reach a healthy weight. BMI screening can be repeated to see if these changes are working.  How is BMI calculated?  BMI involves measuring your weight in relation to your height. Both height and weight are measured, and the BMI is calculated from those numbers. This can be done either in English (U[place] or metric measurements. Note that charts and online BMI calculators are available to help you find your BMI quickly and easily without having to do these calculations yourself.  To calculate your BMI in English (U.S[place] measurements:    Measure your weight in pounds (lb).  Multiply the number of pounds by 703.  For example, for a person who weighs 180 lb, multiply that number by 703, which equals 126,540.  Measure your height in inches. Then multiply that number by itself to get a measurement called \"inches squared.\"  For example, for a person who is 70 inches tall, the \"inches squared\" measurement is 70 inches x 70 inches, which equals 4,900 inches squared.  Divide the total from step 2 (number of lb x 703) by the total from step 3 (inches squared): 126,540 ö 4,900 = 25.8. This is your BMI.  To calculate your BMI in metric measurements:  Measure your weight in kilograms (kg).  Measure your height in meters (m). Then multiply that number by itself to get a measurement called \"meters squared.\"  For example, for a person who is 1.75 m tall, the \"meters squared\" " measurement is 1.75 m x 1.75 m, which is equal to 3.1 meters squared.  Divide the number of kilograms (your weight) by the meters squared number. In this example: 70 ö 3.1 = 22.6. This is your BMI.  What do the results mean?  BMI charts are used to identify whether you are underweight, normal weight, overweight, or obese. The following guidelines will be used:  Underweight: BMI less than 18.5.  Normal weight: BMI between 18.5 and 24.9.  Overweight: BMI between 25 and 29.9.  Obese: BMI of 30 or above.  Keep these notes in mind:  Weight includes both fat and muscle, so someone with a muscular build, such as an athlete, may have a BMI that is higher than 24.9. In cases like these, BMI is not an accurate measure of body fat.  To determine if excess body fat is the cause of a BMI of 25 or higher, further assessments may need to be done by a health care provider.  BMI is usually interpreted in the same way for men and women.  Where to find more information  For more information about BMI, including tools to quickly calculate your BMI, go to these websites:  Centers for Disease Control and Prevention: www.cdc.gov  American Heart Association: www.heart.org  National Heart, Lung, and Blood [place]: www.nhlbi.nih.gov  Summary  Body mass index (BMI) is a number that is calculated from a person's weight and height.  BMI may help estimate how much of a person's weight is composed of fat. BMI can help identify those who may be at higher risk for certain medical problems.  BMI can be measured using English measurements or metric measurements.  BMI charts are used to identify whether you are underweight, normal weight, overweight, or obese.  This information is not intended to replace advice given to you by your health care provider. Make sure you discuss any questions you have with your health care provider.  Document Revised: 09/09/2020 Document Reviewed: 07/17/2020  [employer] Patient Education c 2023 [employer]  Advance  Directive    Advance directives are legal documents that allow you to make decisions about your health care and medical treatment in case you become unable to communicate for yourself. Advance directives let your wishes be known to family, friends, and health care providers.  Discussing and writing advance directives should happen over time rather than all at once. Advance directives can be changed and updated at any time. There are different types of advance directives, such as:  Medical power of .  Living will.  Do not resuscitate (DNR) order or do not attempt resuscitation (DNAR) order.  Health care proxy and medical power of   A health care proxy is also called a health care agent. This person is appointed to make medical decisions for you when you are unable to make decisions for yourself. Generally, people ask a trusted friend or family member to act as their proxy and represent their preferences. Make sure you have an agreement with your trusted person to act as your proxy. A proxy may have to make a medical decision on your behalf if your wishes are not known.  A medical power of , also called a durable power of  for health care, is a legal document that names your health care proxy. Depending on the laws in your state, the document may need to be:  Signed.  Notarized.  Dated.  Copied.  Witnessed.  Incorporated into your medical record.  You may also want to appoint a trusted person to manage your money in the event you are unable to do so. This is called a durable power of  for finances. It is a separate legal document from the durable power of  for health care. You may choose your health care proxy or someone different to act as your agent in money matters.  If you do not appoint a proxy, or there is a concern that the proxy is not acting in your best interest, a court may appoint a guardian to act on your behalf.  Living will  A living will is a set of  instructions that state your wishes about medical care when you cannot express them yourself. Health care providers should keep a copy of your living will in your medical record. You may want to give a copy to family members or friends. To alert caregivers in case of an emergency, you can place a card in your wallet to let them know that you have a living will and where they can find it. A living will is used if you become:  Terminally ill.  Disabled.  Unable to communicate or make decisions.  The following decisions should be included in your living will:  To use or not to use life support equipment, such as dialysis machines and breathing machines (ventilators).  Whether you want a DNR or DNAR order. This tells health care providers not to use cardiopulmonary resuscitation (CPR) if breathing or heartbeat stops.  To use or not to use tube feeding.  To be given or not to be given food and fluids.  Whether you want comfort (palliative) care when the goal becomes comfort rather than a cure.  Whether you want to donate your organs and tissues.  A living will does not give instructions for distributing your money and property if you should pass away.  DNR or DNAR  A DNR or DNAR order is a request not to have CPR in the event that your heart stops beating or you stop breathing. If a DNR or DNAR order has not been made and shared, a health care provider will try to help any patient whose heart has stopped or who has stopped breathing. If you plan to have surgery, talk with your health care provider about how your DNR or DNAR order will be followed if problems occur.  What if I do not have an advance directive?  Some states assign family decision makers to act on your behalf if you do not have an advance directive. Each state has its own laws about advance directives. You may want to check with your health care provider, , or state representative about the laws in your state.  Summary  Advance directives are legal  documents that allow you to make decisions about your health care and medical treatment in case you become unable to communicate for yourself.  The process of discussing and writing advance directives should happen over time. You can change and update advance directives at any time.  Advance directives may include a medical power of , a living will, and a DNR or DNAR order.  This information is not intended to replace advice given to you by your health care provider. Make sure you discuss any questions you have with your health care provider.  Document Revised: 09/21/2021 Document Reviewed: 09/21/2021  [employer] Patient Education c 2023 [employer]       Plan of care reviewed with patient at the conclusion of today's visit. Education was provided regarding diagnosis, management, and any prescribed or recommended OTC medications.Patient verbalizes understanding of and agreement with management plan.         I spent 30 minutes caring for Deanna on this date of service. This time includes time spent by me in the following activities:preparing for the visit, reviewing tests, obtaining and/or reviewing a separately obtained history, performing a medically appropriate examination and/or evaluation , counseling and educating the patient/family/caregiver, ordering medications, tests, or procedures, referring and communicating with other health care professionals , and documenting information in the medical record    Roberta Rodriguez PA-C      Note: Part of this note may be an electronic transcription/translation of spoken language to printed text using the Dragon Dictation system.

## 2023-08-25 LAB
BASOPHILS # BLD AUTO: 0.05 10*3/MM3 (ref 0–0.2)
BASOPHILS NFR BLD AUTO: 0.6 % (ref 0–1.5)
DEPRECATED RDW RBC AUTO: 41.1 FL (ref 37–54)
EOSINOPHIL # BLD AUTO: 0.06 10*3/MM3 (ref 0–0.4)
EOSINOPHIL NFR BLD AUTO: 0.7 % (ref 0.3–6.2)
ERYTHROCYTE [DISTWIDTH] IN BLOOD BY AUTOMATED COUNT: 15.1 % (ref 12.3–15.4)
HCT VFR BLD AUTO: 35.1 % (ref 34–46.6)
HGB BLD-MCNC: 11 G/DL (ref 12–15.9)
IMM GRANULOCYTES # BLD AUTO: 0.03 10*3/MM3 (ref 0–0.05)
IMM GRANULOCYTES NFR BLD AUTO: 0.4 % (ref 0–0.5)
LYMPHOCYTES # BLD AUTO: 2.78 10*3/MM3 (ref 0.7–3.1)
LYMPHOCYTES NFR BLD AUTO: 34.3 % (ref 19.6–45.3)
MCH RBC QN AUTO: 24.2 PG (ref 26.6–33)
MCHC RBC AUTO-ENTMCNC: 31.3 G/DL (ref 31.5–35.7)
MCV RBC AUTO: 77.1 FL (ref 79–97)
MONOCYTES # BLD AUTO: 0.55 10*3/MM3 (ref 0.1–0.9)
MONOCYTES NFR BLD AUTO: 6.8 % (ref 5–12)
NEUTROPHILS NFR BLD AUTO: 4.63 10*3/MM3 (ref 1.7–7)
NEUTROPHILS NFR BLD AUTO: 57.2 % (ref 42.7–76)
NRBC BLD AUTO-RTO: 0 /100 WBC (ref 0–0.2)
PLATELET # BLD AUTO: 484 10*3/MM3 (ref 140–450)
PMV BLD AUTO: 10.8 FL (ref 6–12)
RBC # BLD AUTO: 4.55 10*6/MM3 (ref 3.77–5.28)
T3FREE SERPL-MCNC: 3.28 PG/ML (ref 2–4.4)
T4 FREE SERPL-MCNC: 1.38 NG/DL (ref 0.93–1.7)
TSH SERPL DL<=0.05 MIU/L-ACNC: 0.96 UIU/ML (ref 0.27–4.2)
WBC NRBC COR # BLD: 8.1 10*3/MM3 (ref 3.4–10.8)

## 2023-08-26 PROBLEM — E78.2 MIXED HYPERLIPIDEMIA: Status: ACTIVE | Noted: 2023-08-26

## 2023-08-26 NOTE — PATIENT INSTRUCTIONS
"BMI for Adults  What is BMI?  Body mass index (BMI) is a number that is calculated from a person's weight and height. BMI can help estimate how much of a person's weight is composed of fat. BMI does not measure body fat directly. Rather, it is an alternative to procedures that directly measure body fat, which can be difficult and expensive.  BMI can help identify people who may be at higher risk for certain medical problems.  What are BMI measurements used for?  BMI is used as a screening tool to identify possible weight problems. It helps determine whether a person is obese, overweight, a healthy weight, or underweight.  BMI is useful for:  Identifying a weight problem that may be related to a medical condition or may increase the risk for medical problems.  Promoting changes, such as changes in diet and exercise, to help reach a healthy weight. BMI screening can be repeated to see if these changes are working.  How is BMI calculated?  BMI involves measuring your weight in relation to your height. Both height and weight are measured, and the BMI is calculated from those numbers. This can be done either in English (U[place] or metric measurements. Note that charts and online BMI calculators are available to help you find your BMI quickly and easily without having to do these calculations yourself.  To calculate your BMI in English (U.S.) measurements:    Measure your weight in pounds (lb).  Multiply the number of pounds by 703.  For example, for a person who weighs 180 lb, multiply that number by 703, which equals 126,540.  Measure your height in inches. Then multiply that number by itself to get a measurement called \"inches squared.\"  For example, for a person who is 70 inches tall, the \"inches squared\" measurement is 70 inches x 70 inches, which equals 4,900 inches squared.  Divide the total from step 2 (number of lb x 703) by the total from step 3 (inches squared): 126,540 ö 4,900 = 25.8. This is your BMI.  To " "calculate your BMI in metric measurements:  Measure your weight in kilograms (kg).  Measure your height in meters (m). Then multiply that number by itself to get a measurement called \"meters squared.\"  For example, for a person who is 1.75 m tall, the \"meters squared\" measurement is 1.75 m x 1.75 m, which is equal to 3.1 meters squared.  Divide the number of kilograms (your weight) by the meters squared number. In this example: 70 ö 3.1 = 22.6. This is your BMI.  What do the results mean?  BMI charts are used to identify whether you are underweight, normal weight, overweight, or obese. The following guidelines will be used:  Underweight: BMI less than 18.5.  Normal weight: BMI between 18.5 and 24.9.  Overweight: BMI between 25 and 29.9.  Obese: BMI of 30 or above.  Keep these notes in mind:  Weight includes both fat and muscle, so someone with a muscular build, such as an athlete, may have a BMI that is higher than 24.9. In cases like these, BMI is not an accurate measure of body fat.  To determine if excess body fat is the cause of a BMI of 25 or higher, further assessments may need to be done by a health care provider.  BMI is usually interpreted in the same way for men and women.  Where to find more information  For more information about BMI, including tools to quickly calculate your BMI, go to these websites:  Centers for Disease Control and Prevention: www.cdc.gov  American Heart Association: www.heart.org  National Heart, Lung, and Blood [place]: www.nhlbi.nih.gov  Summary  Body mass index (BMI) is a number that is calculated from a person's weight and height.  BMI may help estimate how much of a person's weight is composed of fat. BMI can help identify those who may be at higher risk for certain medical problems.  BMI can be measured using English measurements or metric measurements.  BMI charts are used to identify whether you are underweight, normal weight, overweight, or obese.  This information is not " intended to replace advice given to you by your health care provider. Make sure you discuss any questions you have with your health care provider.  Document Revised: 09/09/2020 Document Reviewed: 07/17/2020  [employer] Patient Education c 2023 [employer]  Advance Directive    Advance directives are legal documents that allow you to make decisions about your health care and medical treatment in case you become unable to communicate for yourself. Advance directives let your wishes be known to family, friends, and health care providers.  Discussing and writing advance directives should happen over time rather than all at once. Advance directives can be changed and updated at any time. There are different types of advance directives, such as:  Medical power of .  Living will.  Do not resuscitate (DNR) order or do not attempt resuscitation (DNAR) order.  Health care proxy and medical power of   A health care proxy is also called a health care agent. This person is appointed to make medical decisions for you when you are unable to make decisions for yourself. Generally, people ask a trusted friend or family member to act as their proxy and represent their preferences. Make sure you have an agreement with your trusted person to act as your proxy. A proxy may have to make a medical decision on your behalf if your wishes are not known.  A medical power of , also called a durable power of  for health care, is a legal document that names your health care proxy. Depending on the laws in your state, the document may need to be:  Signed.  Notarized.  Dated.  Copied.  Witnessed.  Incorporated into your medical record.  You may also want to appoint a trusted person to manage your money in the event you are unable to do so. This is called a durable power of  for finances. It is a separate legal document from the durable power of  for health care. You may choose your health care proxy  or someone different to act as your agent in money matters.  If you do not appoint a proxy, or there is a concern that the proxy is not acting in your best interest, a court may appoint a guardian to act on your behalf.  Living will  A living will is a set of instructions that state your wishes about medical care when you cannot express them yourself. Health care providers should keep a copy of your living will in your medical record. You may want to give a copy to family members or friends. To alert caregivers in case of an emergency, you can place a card in your wallet to let them know that you have a living will and where they can find it. A living will is used if you become:  Terminally ill.  Disabled.  Unable to communicate or make decisions.  The following decisions should be included in your living will:  To use or not to use life support equipment, such as dialysis machines and breathing machines (ventilators).  Whether you want a DNR or DNAR order. This tells health care providers not to use cardiopulmonary resuscitation (CPR) if breathing or heartbeat stops.  To use or not to use tube feeding.  To be given or not to be given food and fluids.  Whether you want comfort (palliative) care when the goal becomes comfort rather than a cure.  Whether you want to donate your organs and tissues.  A living will does not give instructions for distributing your money and property if you should pass away.  DNR or DNAR  A DNR or DNAR order is a request not to have CPR in the event that your heart stops beating or you stop breathing. If a DNR or DNAR order has not been made and shared, a health care provider will try to help any patient whose heart has stopped or who has stopped breathing. If you plan to have surgery, talk with your health care provider about how your DNR or DNAR order will be followed if problems occur.  What if I do not have an advance directive?  Some states assign family decision makers to act on your  behalf if you do not have an advance directive. Each state has its own laws about advance directives. You may want to check with your health care provider, , or state representative about the laws in your state.  Summary  Advance directives are legal documents that allow you to make decisions about your health care and medical treatment in case you become unable to communicate for yourself.  The process of discussing and writing advance directives should happen over time. You can change and update advance directives at any time.  Advance directives may include a medical power of , a living will, and a DNR or DNAR order.  This information is not intended to replace advice given to you by your health care provider. Make sure you discuss any questions you have with your health care provider.  Document Revised: 09/21/2021 Document Reviewed: 09/21/2021  [employer] Patient Education c 2023 [employer]

## 2023-08-28 NOTE — PROGRESS NOTES
[facility] Cardiology  Follow Up Visit  Deanna Putnam  1985    VISIT DATE:  08/29/23    PCP:   Roberta Rodriguez PA-C  [address] [address]          CC:  Essential hypertension      Problem List:  1.  HTN  2.  DM  3.  HASEEB on CPAP  4.  Asthma     Cardiac testing:     Echo April 2022:   Moderate mitral valve regurgitation is present.  Estimated right ventricular systolic pressure from tricuspid regurgitation is mildly elevated (35-45 mmHg). Calculated right ventricular systolic pressure from tricuspid regurgitation is 35 mmHg.  Normal LVSF     Renal duplex April 2022: No evidence of bilateral renal artery stenosis    History of Present Illness:  Deanna Putnam  Is a 37 y.o. female with pertinent cardiac history detailed above.  Patient following up for hypertension.  She resumed medications Saturday of this week but had been off of them for a number of months.   .  During this time her Cholesterol and hemoglobin A1c were noted to increase significantly.  She was just placed on Jardiance for her diabetes at this last visit.  She is not having any heart failure symptoms.  No dyspnea or edema.  She now states she has adequate refills on all current medications.      Patient Active Problem List    Diagnosis Date Noted    Mixed hyperlipidemia 08/26/2023    Routine medical exam 08/24/2023     Note Last Updated: 8/26/2023     Deanna struggles with compliance.  Long discussion today regarding importance of taking medications daily.  Fasting labs today.  Restart medications immediately.  Order for MMG placed.  Close follow up warranted.      HASEEB (obstructive sleep apnea) - possible 08/22/2022    Excessive daytime sleepiness 08/22/2022    Allergic rhinitis 05/27/2022    Left hip pain 05/27/2022     Note Last Updated: 5/27/2022     Referral to PT for evaluation.      Physical deconditioning 05/27/2022    Ankle edema, bilateral 05/27/2022     Note Last Updated:  5/27/2022     We will start chlorthalidone 50 mg tablets daily.  Follow-up in 1 month.  Try and elevate legs throughout the day.      Anemia, iron deficiency 05/26/2022    Diabetes 05/26/2022     Note Last Updated: 8/24/2023     Noncompliant with medications.  Point-of-care A1c in the office today 10.2%.  She will resume Jardiance 10 mg immediately.  Intolerance to metformin.  Close follow-up warranted.        Hypertensive emergency 04/20/2022    Hyponatremia 04/20/2022    Medically noncompliant 04/20/2022    Menorrhagia with regular cycle 06/01/2020     Note Last Updated: 5/24/2021     Likely due to fibroid uterus.  She has had consultation with gynecology.  I do feel her heavy periods may be the source of her decreasing hemoglobin.  We will continue to monitor.  Gynecologist did recommend hysterectomy, but patient wants to see if she can biologically have children.  Discussed possible Mirena, will depend on location and size of fibroids.      Hip pain, bilateral 06/01/2020    Vitamin D deficiency 06/01/2020    Arthralgia of both knees 03/02/2020    Screening, lipid 03/02/2020    Bloating 03/02/2020    Essential hypertension 02/11/2019     Note Last Updated: 8/24/2023     Resume amlodipine 10 mg and lisinopril 40 mg daily.      Obesity, morbid, BMI 40.0-49.9 02/11/2019    Recurrent major depressive disorder, in full remission 02/11/2019     Note Last Updated: 8/24/2023     Start lexapro 10mg daily.      Mild intermittent asthma without complication 02/11/2019     Note Last Updated: 5/24/2021     Continue Symbicort as directed.  Continue Ventolin as directed.      PCOS (polycystic ovarian syndrome) 02/11/2019    Intramural and subserous leiomyoma of uterus 12/18/2017       Allergies   Allergen Reactions    Eggs Or Egg-Derived Products Nausea And Vomiting    Mold Extract [Trichophyton] Unknown (See Comments)     unknown       Social History     Socioeconomic History    Marital status: Single   Tobacco Use    Smoking  status: Never    Smokeless tobacco: Never   Vaping Use    Vaping Use: Never used   Substance and Sexual Activity    Alcohol use: Yes     Alcohol/week: 2.0 standard drinks     Types: 2 Glasses of wine per week     Comment: rarely    Drug use: No    Sexual activity: Not Currently     Partners: Female, Male     Birth control/protection: Condom       Family History   Problem Relation Age of Onset    Endometrial cancer Mother 59    Diabetes Mother     Hypertension Mother     Cancer Mother     Hyperlipidemia Mother     Miscarriages / Stillbirths Mother     Vision loss Mother     Breast cancer Maternal Grandmother 80    Colon cancer Maternal Grandmother 90    Arthritis Maternal Grandmother     Birth defects Maternal Grandmother     Diabetes Maternal Grandmother     Cancer Maternal Grandmother     Hyperlipidemia Maternal Grandmother     Vision loss Maternal Grandmother     Breast cancer Maternal Aunt 60    Cancer Maternal Aunt     Diabetes Father     Hypertension Father     Hyperlipidemia Father     Diabetes Paternal Grandmother     Hypertension Paternal Grandmother     Hyperlipidemia Paternal Grandmother     Hypertension Paternal Grandfather     Diabetes Paternal Grandfather     Hyperlipidemia Paternal Grandfather     Uterine cancer Neg Hx     Ovarian cancer Neg Hx        Current Medications:    Current Outpatient Medications:     amLODIPine (NORVASC) 10 MG tablet, Take 1 tablet by mouth Daily., Disp: 90 tablet, Rfl: 1    cetirizine (zyrTEC) 10 MG tablet, Take 1 tablet by mouth Daily., Disp: 90 tablet, Rfl: 1    chlorthalidone (HYGROTEN) 50 MG tablet, Take 1 tablet by mouth Daily., Disp: 90 tablet, Rfl: 1    empagliflozin (Jardiance) 10 MG tablet tablet, Take 1 tablet by mouth Daily., Disp: 30 tablet, Rfl: 2    escitalopram (Lexapro) 10 MG tablet, Take 1 tablet by mouth Daily., Disp: 90 tablet, Rfl: 1    fluticasone (FLONASE) 50 MCG/ACT nasal spray, 2 sprays into the nostril(s) as directed by provider Daily., Disp: 48 mL,  "Rfl: 1    lisinopril (PRINIVIL,ZESTRIL) 40 MG tablet, Take 1 tablet by mouth Daily., Disp: 90 tablet, Rfl: 1    rosuvastatin (CRESTOR) 10 MG tablet, Take 1 tablet by mouth Every Night., Disp: 90 tablet, Rfl: 3    Symbicort 160-4.5 MCG/ACT inhaler, TAKE 2 PUFFS BY MOUTH TWICE A DAY, Disp: 10.2 each, Rfl: 3     Review of Systems   Cardiovascular:  Negative for chest pain, dyspnea on exertion and leg swelling.   Psychiatric/Behavioral:  Positive for depression.         Anxiety     Vitals:    08/29/23 1016   BP: 140/90   BP Location: Left arm   Patient Position: Sitting   Pulse: 78   SpO2: 99%   Weight: 109 kg (240 lb)   Height: 160 cm (63\")       Physical Exam  Constitutional:       Appearance: Normal appearance.   Cardiovascular:      Rate and Rhythm: Normal rate and regular rhythm.      Pulses: Normal pulses.      Heart sounds: Normal heart sounds.   Pulmonary:      Effort: Pulmonary effort is normal.      Breath sounds: Normal breath sounds.   Musculoskeletal:      Right lower leg: No edema.      Left lower leg: No edema.   Neurological:      Mental Status: She is alert.       Diagnostic Data:  Procedures  Lab Results   Component Value Date    CHLPL 154 06/15/2015    TRIG 127 08/24/2023    HDL 45 08/24/2023     Lab Results   Component Value Date    GLUCOSE 241 (H) 08/24/2023    BUN 12 08/24/2023    CREATININE 0.77 08/24/2023     08/24/2023    K 3.9 08/24/2023    CL 96 (L) 08/24/2023    CO2 27.6 08/24/2023     Lab Results   Component Value Date    HGBA1C 10.2 08/24/2023     Lab Results   Component Value Date    WBC 8.10 08/24/2023    HGB 11.0 (L) 08/24/2023    HCT 35.1 08/24/2023     (H) 08/24/2023       Assessment:  No diagnosis found.    Plan:    1.  HTN  -recently without all medications.  BP elevated today  -Chlorthalidone, lisinopril, amlodipine just restarted in the last 3 days  -Negative renal artery duplex last year  -She has PCP follow-up in September.  If blood pressure remaining above 140/90 " at that visit would recommend adding Bystolic 5 mg    2.  Diabetes  -, A1c 10.2  -increase crestor 20mg  -started on jardiance this week     3.  Moderate mitral regurgitation  -initially diagnosed during admission with HTN urgency, which may have exacerbated degree of severity  -EF  55%  -No heart failure symptoms    Follow-up 4 months        ACP discussion was held with the patient during this visit. Patient does not have an advance directive, declines further assistance.      Medhat Marinelli MD [facility]

## 2023-08-29 ENCOUNTER — OFFICE VISIT (OUTPATIENT)
Dept: CARDIOLOGY | Facility: CLINIC | Age: 38
End: 2023-08-29
Payer: COMMERCIAL

## 2023-08-29 VITALS
SYSTOLIC BLOOD PRESSURE: 140 MMHG | HEART RATE: 78 BPM | OXYGEN SATURATION: 99 % | DIASTOLIC BLOOD PRESSURE: 90 MMHG | WEIGHT: 240 LBS | HEIGHT: 63 IN | BODY MASS INDEX: 42.52 KG/M2

## 2023-08-29 DIAGNOSIS — E78.2 MIXED HYPERLIPIDEMIA: Primary | ICD-10-CM

## 2023-08-29 PROCEDURE — 99214 OFFICE O/P EST MOD 30 MIN: CPT | Performed by: INTERNAL MEDICINE

## 2023-08-29 RX ORDER — ROSUVASTATIN CALCIUM 20 MG/1
20 TABLET, COATED ORAL NIGHTLY
Qty: 30 TABLET | Refills: 6 | Status: SHIPPED | OUTPATIENT
Start: 2023-08-29

## 2023-09-12 PROBLEM — Z01.00 DIABETIC EYE EXAM: Status: ACTIVE | Noted: 2023-09-12

## 2023-09-12 PROBLEM — Z12.31 ENCOUNTER FOR SCREENING MAMMOGRAM FOR MALIGNANT NEOPLASM OF BREAST: Status: ACTIVE | Noted: 2023-09-12

## 2023-09-12 PROBLEM — E11.9 DIABETIC EYE EXAM: Status: ACTIVE | Noted: 2023-09-12

## 2023-09-13 ENCOUNTER — HOSPITAL ENCOUNTER (OUTPATIENT)
Dept: ULTRASOUND IMAGING | Facility: HOSPITAL | Age: 38
Discharge: HOME | End: 2023-09-13
Admitting: PHYSICIAN ASSISTANT
Payer: COMMERCIAL

## 2023-09-13 DIAGNOSIS — R13.10 DYSPHAGIA, UNSPECIFIED TYPE: ICD-10-CM

## 2023-09-13 PROCEDURE — 76536 US EXAM OF HEAD AND NECK: CPT

## 2023-09-21 ENCOUNTER — OFFICE VISIT (OUTPATIENT)
Dept: INTERNAL MEDICINE | Facility: CLINIC | Age: 38
End: 2023-09-21
Payer: COMMERCIAL

## 2023-09-21 VITALS
BODY MASS INDEX: 42.1 KG/M2 | SYSTOLIC BLOOD PRESSURE: 120 MMHG | HEART RATE: 118 BPM | DIASTOLIC BLOOD PRESSURE: 96 MMHG | TEMPERATURE: 98.7 F | OXYGEN SATURATION: 99 % | HEIGHT: 63 IN | WEIGHT: 237.6 LBS

## 2023-09-21 DIAGNOSIS — F33.42 RECURRENT MAJOR DEPRESSIVE DISORDER, IN FULL REMISSION: ICD-10-CM

## 2023-09-21 DIAGNOSIS — E66.01 MORBID OBESITY WITH BODY MASS INDEX (BMI) OF 40.0 TO 49.9: ICD-10-CM

## 2023-09-21 DIAGNOSIS — E11.65 TYPE 2 DIABETES MELLITUS WITH HYPERGLYCEMIA, WITHOUT LONG-TERM CURRENT USE OF INSULIN: ICD-10-CM

## 2023-09-21 DIAGNOSIS — I10 ESSENTIAL HYPERTENSION: Primary | ICD-10-CM

## 2023-09-21 DIAGNOSIS — E78.2 MIXED HYPERLIPIDEMIA: ICD-10-CM

## 2023-09-21 NOTE — PATIENT INSTRUCTIONS

## 2023-11-21 ENCOUNTER — OFFICE VISIT (OUTPATIENT)
Dept: INTERNAL MEDICINE | Facility: CLINIC | Age: 38
End: 2023-11-21
Payer: COMMERCIAL

## 2023-11-21 ENCOUNTER — LAB (OUTPATIENT)
Dept: LAB | Facility: HOSPITAL | Age: 38
End: 2023-11-21
Payer: COMMERCIAL

## 2023-11-21 VITALS
HEIGHT: 63 IN | TEMPERATURE: 99.6 F | BODY MASS INDEX: 42.28 KG/M2 | SYSTOLIC BLOOD PRESSURE: 130 MMHG | WEIGHT: 238.6 LBS | HEART RATE: 92 BPM | DIASTOLIC BLOOD PRESSURE: 88 MMHG

## 2023-11-21 DIAGNOSIS — E78.2 MIXED HYPERLIPIDEMIA: ICD-10-CM

## 2023-11-21 DIAGNOSIS — F33.42 RECURRENT MAJOR DEPRESSIVE DISORDER, IN FULL REMISSION: ICD-10-CM

## 2023-11-21 DIAGNOSIS — E11.65 TYPE 2 DIABETES MELLITUS WITH HYPERGLYCEMIA, WITHOUT LONG-TERM CURRENT USE OF INSULIN: ICD-10-CM

## 2023-11-21 DIAGNOSIS — I10 ESSENTIAL HYPERTENSION: ICD-10-CM

## 2023-11-21 DIAGNOSIS — E11.65 TYPE 2 DIABETES MELLITUS WITH HYPERGLYCEMIA, WITHOUT LONG-TERM CURRENT USE OF INSULIN: Primary | ICD-10-CM

## 2023-11-21 DIAGNOSIS — E66.01 MORBID OBESITY WITH BODY MASS INDEX (BMI) OF 40.0 TO 49.9: ICD-10-CM

## 2023-11-21 DIAGNOSIS — E55.9 VITAMIN D DEFICIENCY: ICD-10-CM

## 2023-11-21 LAB
25(OH)D3 SERPL-MCNC: 17.1 NG/ML (ref 30–100)
ALBUMIN SERPL-MCNC: 4.4 G/DL (ref 3.5–5.2)
ALBUMIN/GLOB SERPL: 1.4 G/DL
ALP SERPL-CCNC: 78 U/L (ref 39–117)
ALT SERPL W P-5'-P-CCNC: 15 U/L (ref 1–33)
ANION GAP SERPL CALCULATED.3IONS-SCNC: 9.5 MMOL/L (ref 5–15)
AST SERPL-CCNC: 12 U/L (ref 1–32)
BASOPHILS # BLD AUTO: 0.06 10*3/MM3 (ref 0–0.2)
BASOPHILS NFR BLD AUTO: 0.8 % (ref 0–1.5)
BILIRUB SERPL-MCNC: 0.3 MG/DL (ref 0–1.2)
BUN SERPL-MCNC: 13 MG/DL (ref 6–20)
BUN/CREAT SERPL: 12.7 (ref 7–25)
CALCIUM SPEC-SCNC: 9.6 MG/DL (ref 8.6–10.5)
CHLORIDE SERPL-SCNC: 100 MMOL/L (ref 98–107)
CHOLEST SERPL-MCNC: 103 MG/DL (ref 0–200)
CO2 SERPL-SCNC: 27.5 MMOL/L (ref 22–29)
CREAT SERPL-MCNC: 1.02 MG/DL (ref 0.57–1)
DEPRECATED RDW RBC AUTO: 44.3 FL (ref 37–54)
EGFRCR SERPLBLD CKD-EPI 2021: 71.5 ML/MIN/1.73
EOSINOPHIL # BLD AUTO: 0.09 10*3/MM3 (ref 0–0.4)
EOSINOPHIL NFR BLD AUTO: 1.2 % (ref 0.3–6.2)
ERYTHROCYTE [DISTWIDTH] IN BLOOD BY AUTOMATED COUNT: 15.4 % (ref 12.3–15.4)
GLOBULIN UR ELPH-MCNC: 3.2 GM/DL
GLUCOSE SERPL-MCNC: 208 MG/DL (ref 65–99)
HBA1C MFR BLD: 9.3 % (ref 4.8–5.6)
HCT VFR BLD AUTO: 35 % (ref 34–46.6)
HDLC SERPL-MCNC: 33 MG/DL (ref 40–60)
HGB BLD-MCNC: 11 G/DL (ref 12–15.9)
IMM GRANULOCYTES # BLD AUTO: 0.02 10*3/MM3 (ref 0–0.05)
IMM GRANULOCYTES NFR BLD AUTO: 0.3 % (ref 0–0.5)
LDLC SERPL CALC-MCNC: 50 MG/DL (ref 0–100)
LDLC/HDLC SERPL: 1.5 {RATIO}
LYMPHOCYTES # BLD AUTO: 2.77 10*3/MM3 (ref 0.7–3.1)
LYMPHOCYTES NFR BLD AUTO: 37.6 % (ref 19.6–45.3)
MCH RBC QN AUTO: 24.9 PG (ref 26.6–33)
MCHC RBC AUTO-ENTMCNC: 31.4 G/DL (ref 31.5–35.7)
MCV RBC AUTO: 79.4 FL (ref 79–97)
MONOCYTES # BLD AUTO: 0.47 10*3/MM3 (ref 0.1–0.9)
MONOCYTES NFR BLD AUTO: 6.4 % (ref 5–12)
NEUTROPHILS NFR BLD AUTO: 3.95 10*3/MM3 (ref 1.7–7)
NEUTROPHILS NFR BLD AUTO: 53.7 % (ref 42.7–76)
NRBC BLD AUTO-RTO: 0 /100 WBC (ref 0–0.2)
PLATELET # BLD AUTO: 445 10*3/MM3 (ref 140–450)
PMV BLD AUTO: 10.8 FL (ref 6–12)
POTASSIUM SERPL-SCNC: 3.6 MMOL/L (ref 3.5–5.2)
PROT SERPL-MCNC: 7.6 G/DL (ref 6–8.5)
RBC # BLD AUTO: 4.41 10*6/MM3 (ref 3.77–5.28)
SODIUM SERPL-SCNC: 137 MMOL/L (ref 136–145)
T3FREE SERPL-MCNC: 2.82 PG/ML (ref 2–4.4)
T4 FREE SERPL-MCNC: 1.11 NG/DL (ref 0.93–1.7)
TRIGL SERPL-MCNC: 103 MG/DL (ref 0–150)
TSH SERPL DL<=0.05 MIU/L-ACNC: 0.84 UIU/ML (ref 0.27–4.2)
VIT B12 BLD-MCNC: 501 PG/ML (ref 211–946)
VLDLC SERPL-MCNC: 20 MG/DL (ref 5–40)
WBC NRBC COR # BLD AUTO: 7.36 10*3/MM3 (ref 3.4–10.8)

## 2023-11-21 PROCEDURE — 84439 ASSAY OF FREE THYROXINE: CPT

## 2023-11-21 PROCEDURE — 80061 LIPID PANEL: CPT

## 2023-11-21 PROCEDURE — 82607 VITAMIN B-12: CPT

## 2023-11-21 PROCEDURE — 83036 HEMOGLOBIN GLYCOSYLATED A1C: CPT

## 2023-11-21 PROCEDURE — 80050 GENERAL HEALTH PANEL: CPT

## 2023-11-21 PROCEDURE — 84481 FREE ASSAY (FT-3): CPT

## 2023-11-21 PROCEDURE — 82306 VITAMIN D 25 HYDROXY: CPT

## 2023-11-21 NOTE — PROGRESS NOTES
[facility]    Deanna Putnam  1985   [id number]      Patient Care Team:  Roberta Rodriguez PA-C as PCP - General (Internal Medicine)  Leann Alejo DO (Inactive) as Consulting Physician (Obstetrics and Gynecology)  Jorge Alberto Arellano MD as Consulting Physician (Obstetrics and Gynecology)  Mentzer, Elizabeth Kathleen, PA (Physician Assistant)  Nolan Biswas MD as Consulting Physician (Gastroenterology)  Medhat Marinelli MD as Consulting Physician (Cardiology)  Luis Wolf MD as Consulting Physician (Pulmonary Disease)    Chief Complaint::   Chief Complaint   Patient presents with    Hypertension     fu            HPI        Patient Active Problem List   Diagnosis    Intramural and subserous leiomyoma of uterus    Essential hypertension    Morbid obesity with body mass index (BMI) of 40.0 to 49.9    Recurrent major depressive disorder, in full remission    Mild intermittent asthma without complication    PCOS (polycystic ovarian syndrome)    Arthralgia of both knees    Screening, lipid    Bloating    Menorrhagia with regular cycle    Hip pain, bilateral    Vitamin D deficiency    Hypertensive emergency    Hyponatremia    Medically noncompliant    Anemia, iron deficiency    Diabetes    Allergic rhinitis    Left hip pain    Physical deconditioning    Ankle edema, bilateral    HASEEB (obstructive sleep apnea) - possible    Excessive daytime sleepiness    Routine medical exam    Mixed hyperlipidemia    Diabetic eye exam    Encounter for screening mammogram for malignant neoplasm of breast        Past Medical History:   Diagnosis Date    Allergic     Anemia     Arthritis of back January 2011    Asthma     Bursitis of hip May 2022    Depression     Diabetes mellitus     Hypertension     Obesity     HASEEB (obstructive sleep apnea)     Osteoarthritis of back     Prediabetes        Past Surgical History:   Procedure Laterality Date    CHOLECYSTECTOMY  2018     DIAGNOSTIC LAPAROSCOPY  12/13/2018    ENDOMETRIAL ABLATION  2018    Did not get endometriosis out    EYE SURGERY  2015    eyelid lift    GALLBLADDER SURGERY  04/2018    TONSILLECTOMY      WISDOM TOOTH EXTRACTION         Family History   Problem Relation Age of Onset    Endometrial cancer Mother 59    Diabetes Mother     Hypertension Mother     Cancer Mother     Hyperlipidemia Mother     Miscarriages / Stillbirths Mother     Vision loss Mother     Breast cancer Maternal Grandmother 80    Colon cancer Maternal Grandmother 90    Arthritis Maternal Grandmother     Birth defects Maternal Grandmother     Diabetes Maternal Grandmother     Cancer Maternal Grandmother     Hyperlipidemia Maternal Grandmother     Vision loss Maternal Grandmother     Breast cancer Maternal Aunt 60    Cancer Maternal Aunt     Diabetes Father     Hypertension Father     Hyperlipidemia Father     Diabetes Paternal Grandmother     Hypertension Paternal Grandmother     Hyperlipidemia Paternal Grandmother     Hypertension Paternal Grandfather     Diabetes Paternal Grandfather     Hyperlipidemia Paternal Grandfather     Uterine cancer Neg Hx     Ovarian cancer Neg Hx        Social History     Socioeconomic History    Marital status: Single   Tobacco Use    Smoking status: Never    Smokeless tobacco: Never   Vaping Use    Vaping Use: Never used   Substance and Sexual Activity    Alcohol use: Yes     Alcohol/week: 2.0 standard drinks of alcohol     Types: 2 Glasses of wine per week     Comment: rarely    Drug use: No    Sexual activity: Not Currently     Partners: Female, Male     Birth control/protection: Condom       Allergies   Allergen Reactions    Eggs Or Egg-Derived Products Nausea And Vomiting    Mold Extract [Trichophyton] Unknown (See Comments)     unknown       Review of Systems     Vital Signs  Vitals:    11/21/23 0948 11/21/23 1245   BP: 140/96 130/88   BP Location: Right arm    Patient Position: Sitting    Cuff Size: Adult    Pulse: 92   "  Temp: 99.6 °F (37.6 °C)    TempSrc: Infrared    Weight: 108 kg (238 lb 9.6 oz)    Height: 160 cm (62.99\")    PainSc: 0-No pain      Body mass index is 42.28 kg/m².        Advance Care Planning   ACP discussion was held with the patient during this visit. Patient does not have an advance directive, information provided.       Current Outpatient Medications:     amLODIPine (NORVASC) 10 MG tablet, Take 1 tablet by mouth Daily., Disp: 90 tablet, Rfl: 1    cetirizine (zyrTEC) 10 MG tablet, Take 1 tablet by mouth Daily., Disp: 90 tablet, Rfl: 1    chlorthalidone (HYGROTEN) 50 MG tablet, Take 1 tablet by mouth Daily., Disp: 90 tablet, Rfl: 1    empagliflozin (Jardiance) 10 MG tablet tablet, Take 1 tablet by mouth Daily., Disp: 30 tablet, Rfl: 2    escitalopram (Lexapro) 10 MG tablet, Take 1 tablet by mouth Daily., Disp: 90 tablet, Rfl: 1    fluticasone (FLONASE) 50 MCG/ACT nasal spray, 2 sprays into the nostril(s) as directed by provider Daily. (Patient taking differently: 2 sprays into the nostril(s) as directed by provider As Needed.), Disp: 48 mL, Rfl: 1    lisinopril (PRINIVIL,ZESTRIL) 40 MG tablet, Take 1 tablet by mouth Daily., Disp: 90 tablet, Rfl: 1    rosuvastatin (CRESTOR) 20 MG tablet, Take 1 tablet by mouth Every Night., Disp: 30 tablet, Rfl: 6    Symbicort 160-4.5 MCG/ACT inhaler, TAKE 2 PUFFS BY MOUTH TWICE A DAY (Patient taking differently: As Needed.), Disp: 10.2 each, Rfl: 3    Physical Exam  Vitals reviewed.   Constitutional:       Appearance: Normal appearance. She is well-developed.   HENT:      Head: Normocephalic and atraumatic.      Right Ear: Hearing, tympanic membrane, ear canal and external ear normal.      Left Ear: Hearing, tympanic membrane, ear canal and external ear normal.      Nose: Nose normal.      Mouth/Throat:      Mouth: Mucous membranes are moist.      Pharynx: Oropharynx is clear. Uvula midline.   Eyes:      General: Lids are normal.      Conjunctiva/sclera: Conjunctivae normal.    "   Pupils: Pupils are equal, round, and reactive to light.   Cardiovascular:      Rate and Rhythm: Normal rate and regular rhythm.      Heart sounds: Normal heart sounds.   Pulmonary:      Effort: Pulmonary effort is normal.      Breath sounds: Normal breath sounds.   Abdominal:      General: Bowel sounds are normal.      Palpations: Abdomen is soft.   Musculoskeletal:         General: Normal range of motion.      Cervical back: Full passive range of motion without pain, normal range of motion and neck supple.   Skin:     General: Skin is warm and dry.   Neurological:      General: No focal deficit present.      Mental Status: She is alert and oriented to person, place, and time. Mental status is at baseline.      Deep Tendon Reflexes: Reflexes are normal and symmetric.   Psychiatric:         Speech: Speech normal.         Behavior: Behavior normal.         Thought Content: Thought content normal.         Judgment: Judgment normal.          ACE III MINI            Results Review:    No results found for this or any previous visit (from the past 672 hour(s)).  Procedures    Medication Review: Medications reviewed and noted    Social History     Socioeconomic History    Marital status: Single   Tobacco Use    Smoking status: Never    Smokeless tobacco: Never   Vaping Use    Vaping Use: Never used   Substance and Sexual Activity    Alcohol use: Yes     Alcohol/week: 2.0 standard drinks of alcohol     Types: 2 Glasses of wine per week     Comment: rarely    Drug use: No    Sexual activity: Not Currently     Partners: Female, Male     Birth control/protection: Condom        Assessment/Plan:    Diagnoses and all orders for this visit:    1. Type 2 diabetes mellitus with hyperglycemia, without long-term current use of insulin (Primary)  Overview:  Continue to take Jardiance as instructed.  Fasting labs today.     Orders:  -     Hemoglobin A1c; Future    2. Mixed hyperlipidemia  Overview:  Try and cut back on processed foods  and fatty foods in the diet.  Continue rosuvastatin 20 mg nightly.    Orders:  -     Lipid Panel; Future  -     MicroAlbumin, Urine, Random - Urine, Clean Catch; Future    3. Essential hypertension  Overview:  Continue amlodipine 10 mg and lisinopril 40 mg daily.  Significant improvement in blood pressure.    Orders:  -     CBC & Differential; Future  -     Comprehensive Metabolic Panel; Future    4. Vitamin D deficiency  -     Vitamin D,25-Hydroxy; Future    5. Morbid obesity with body mass index (BMI) of 40.0 to 49.9  Overview:  She has lost several pounds since her last appointment.    Orders:  -     TSH; Future  -     T4, Free; Future  -     T3, Free; Future  -     Vitamin B12; Future    6. Recurrent major depressive disorder, in full remission  Overview:  Improvement with lexapro 10mg daily.      Other orders  -     Pneumococcal Conjugate Vaccine 20-Valent All         There are no Patient Instructions on file for this visit.     Plan of care reviewed with patient at the conclusion of today's visit. Education was provided regarding diagnosis, management, and any prescribed or recommended OTC medications.Patient verbalizes understanding of and agreement with management plan.         I spent 47 minutes caring for Deanna on this date of service. This time includes time spent by me in the following activities:preparing for the visit, reviewing tests, obtaining and/or reviewing a separately obtained history, performing a medically appropriate examination and/or evaluation , counseling and educating the patient/family/caregiver, ordering medications, tests, or procedures, referring and communicating with other health care professionals , and documenting information in the medical record    Roberta Rodriguez PA-C      Note: Part of this note may be an electronic transcription/translation of spoken language to printed text using the Dragon Dictation system.

## 2023-11-21 NOTE — PROGRESS NOTES
[facility] Internal Medicine    Deanna Putnam  1985   [id number]      Patient Care Team:  Roberta Rodriguez PA-C as PCP - General (Internal Medicine)  Leann Alejo DO (Inactive) as Consulting Physician (Obstetrics and Gynecology)  Jorge Alberto Arellano MD as Consulting Physician (Obstetrics and Gynecology)  Mentzer, Elizabeth Kathleen, PA (Physician Assistant)  Nolan Biswas MD as Consulting Physician (Gastroenterology)  Medhat Marinelli MD as Consulting Physician (Cardiology)  Luis Wolf MD as Consulting Physician (Pulmonary Disease)    Chief Complaint::   Chief Complaint   Patient presents with    Hypertension     fu        SHABBIR Quintero is a 38-year-old female date of birth 1985 who presents today for follow-up.  Past medical history significant for hypertension, type 2 diabetes, anxiety depression, hyperlipidemia, and asthma.  Historically Leon has struggled with compliance.  Current weight today 238, which is stable.  Has started program for marriage and family counseling.  Having difficulty staying focused.  She was doing better when she was having therapy.  She is working at [employer],  for [employer]. Daughter is now 8 years old. She has moved to a new home.  She has not settled in yet.    She has been compliant with medications overall.  She denies chest pain, shortness of breath, palpitations, or headaches.    Patient Active Problem List   Diagnosis    Intramural and subserous leiomyoma of uterus    Essential hypertension    Morbid obesity with body mass index (BMI) of 40.0 to 49.9    Recurrent major depressive disorder, in full remission    Mild intermittent asthma without complication    PCOS (polycystic ovarian syndrome)    Arthralgia of both knees    Screening, lipid    Bloating    Menorrhagia with regular cycle    Hip pain, bilateral    Vitamin D deficiency    Hypertensive emergency    Hyponatremia    Medically noncompliant     Anemia, iron deficiency    Diabetes    Allergic rhinitis    Left hip pain    Physical deconditioning    Ankle edema, bilateral    HASEEB (obstructive sleep apnea) - possible    Excessive daytime sleepiness    Routine medical exam    Mixed hyperlipidemia    Diabetic eye exam    Encounter for screening mammogram for malignant neoplasm of breast        Past Medical History:   Diagnosis Date    Allergic     Anemia     Arthritis of back January 2011    Asthma     Bursitis of hip May 2022    Depression     Diabetes mellitus     Hypertension     Obesity     HASEEB (obstructive sleep apnea)     Osteoarthritis of back     Prediabetes        Past Surgical History:   Procedure Laterality Date    CHOLECYSTECTOMY  2018    DIAGNOSTIC LAPAROSCOPY  12/13/2018    ENDOMETRIAL ABLATION  2018    Did not get endometriosis out    EYE SURGERY  2015    eyelid lift    GALLBLADDER SURGERY  04/2018    TONSILLECTOMY      WISDOM TOOTH EXTRACTION         Family History   Problem Relation Age of Onset    Endometrial cancer Mother 59    Diabetes Mother     Hypertension Mother     Cancer Mother     Hyperlipidemia Mother     Miscarriages / Stillbirths Mother     Vision loss Mother     Breast cancer Maternal Grandmother 80    Colon cancer Maternal Grandmother 90    Arthritis Maternal Grandmother     Birth defects Maternal Grandmother     Diabetes Maternal Grandmother     Cancer Maternal Grandmother     Hyperlipidemia Maternal Grandmother     Vision loss Maternal Grandmother     Breast cancer Maternal Aunt 60    Cancer Maternal Aunt     Diabetes Father     Hypertension Father     Hyperlipidemia Father     Diabetes Paternal Grandmother     Hypertension Paternal Grandmother     Hyperlipidemia Paternal Grandmother     Hypertension Paternal Grandfather     Diabetes Paternal Grandfather     Hyperlipidemia Paternal Grandfather     Uterine cancer Neg Hx     Ovarian cancer Neg Hx        Social History     Socioeconomic History    Marital status: Single   Tobacco  "Use    Smoking status: Never    Smokeless tobacco: Never   Vaping Use    Vaping Use: Never used   Substance and Sexual Activity    Alcohol use: Yes     Alcohol/week: 2.0 standard drinks of alcohol     Types: 2 Glasses of wine per week     Comment: rarely    Drug use: No    Sexual activity: Not Currently     Partners: Female, Male     Birth control/protection: Condom       Allergies   Allergen Reactions    Eggs Or Egg-Derived Products Nausea And Vomiting    Mold Extract [Trichophyton] Unknown (See Comments)     unknown       Review of Systems   Constitutional:  Negative for activity change, appetite change, diaphoresis, fatigue, unexpected weight gain and unexpected weight loss.   HENT:  Negative for hearing loss.    Eyes:  Negative for visual disturbance.   Respiratory:  Negative for chest tightness and shortness of breath.    Cardiovascular:  Negative for chest pain, palpitations and leg swelling.   Gastrointestinal:  Negative for abdominal pain, blood in stool, GERD and indigestion.   Endocrine: Negative for cold intolerance and heat intolerance.   Genitourinary:  Negative for dysuria and hematuria.   Musculoskeletal:  Negative for arthralgias and myalgias.   Skin:  Negative for skin lesions.   Neurological:  Negative for tremors, seizures, syncope, speech difficulty, weakness, headache, memory problem and confusion.   Hematological:  Does not bruise/bleed easily.   Psychiatric/Behavioral:  Negative for sleep disturbance and depressed mood. The patient is not nervous/anxious.         Vital Signs  Vitals:    11/21/23 0948 11/21/23 1245   BP: 140/96 130/88   BP Location: Right arm    Patient Position: Sitting    Cuff Size: Adult    Pulse: 92    Temp: 99.6 °F (37.6 °C)    TempSrc: Infrared    Weight: 108 kg (238 lb 9.6 oz)    Height: 160 cm (62.99\")    PainSc: 0-No pain      Body mass index is 42.28 kg/m².        Advance Care Planning   ACP discussion was held with the patient during this visit. Patient does not have " an advance directive, information provided.       Current Outpatient Medications:     amLODIPine (NORVASC) 10 MG tablet, Take 1 tablet by mouth Daily., Disp: 90 tablet, Rfl: 1    cetirizine (zyrTEC) 10 MG tablet, Take 1 tablet by mouth Daily., Disp: 90 tablet, Rfl: 1    chlorthalidone (HYGROTEN) 50 MG tablet, Take 1 tablet by mouth Daily., Disp: 90 tablet, Rfl: 1    empagliflozin (Jardiance) 10 MG tablet tablet, Take 1 tablet by mouth Daily., Disp: 30 tablet, Rfl: 2    escitalopram (Lexapro) 10 MG tablet, Take 1 tablet by mouth Daily., Disp: 90 tablet, Rfl: 1    fluticasone (FLONASE) 50 MCG/ACT nasal spray, 2 sprays into the nostril(s) as directed by provider Daily. (Patient taking differently: 2 sprays into the nostril(s) as directed by provider As Needed.), Disp: 48 mL, Rfl: 1    lisinopril (PRINIVIL,ZESTRIL) 40 MG tablet, Take 1 tablet by mouth Daily., Disp: 90 tablet, Rfl: 1    rosuvastatin (CRESTOR) 20 MG tablet, Take 1 tablet by mouth Every Night., Disp: 30 tablet, Rfl: 6    Symbicort 160-4.5 MCG/ACT inhaler, TAKE 2 PUFFS BY MOUTH TWICE A DAY (Patient taking differently: As Needed.), Disp: 10.2 each, Rfl: 3    Physical Exam     ACE III MINI            Results Review:    No results found for this or any previous visit (from the past 672 hour(s)).  Procedures    Medication Review: Medications reviewed and noted    Social History     Socioeconomic History    Marital status: Single   Tobacco Use    Smoking status: Never    Smokeless tobacco: Never   Vaping Use    Vaping Use: Never used   Substance and Sexual Activity    Alcohol use: Yes     Alcohol/week: 2.0 standard drinks of alcohol     Types: 2 Glasses of wine per week     Comment: rarely    Drug use: No    Sexual activity: Not Currently     Partners: Female, Male     Birth control/protection: Condom        Assessment/Plan:    Diagnoses and all orders for this visit:    1. Type 2 diabetes mellitus with hyperglycemia, without long-term current use of insulin  (Primary)  Overview:  Continue to take Jardiance as instructed.  Fasting labs today.     Orders:  -     Hemoglobin A1c; Future    2. Mixed hyperlipidemia  Overview:  Try and cut back on processed foods and fatty foods in the diet.  Continue rosuvastatin 20 mg nightly.    Orders:  -     Lipid Panel; Future  -     MicroAlbumin, Urine, Random - Urine, Clean Catch; Future    3. Essential hypertension  Overview:  Continue amlodipine 10 mg and lisinopril 40 mg daily.  Significant improvement in blood pressure.    Orders:  -     CBC & Differential; Future  -     Comprehensive Metabolic Panel; Future    4. Vitamin D deficiency  -     Vitamin D,25-Hydroxy; Future    5. Morbid obesity with body mass index (BMI) of 40.0 to 49.9  Overview:  She has lost several pounds since her last appointment.    Orders:  -     TSH; Future  -     T4, Free; Future  -     T3, Free; Future  -     Vitamin B12; Future    6. Recurrent major depressive disorder, in full remission  Overview:  Improvement with lexapro 10mg daily.      Other orders  -     Pneumococcal Conjugate Vaccine 20-Valent All         Plan of care reviewed with patient at the conclusion of today's visit. Education was provided regarding diagnosis, management, and any prescribed or recommended OTC medications.Patient verbalizes understanding of and agreement with management plan.       I spent 47 minutes caring for Deanna on this date of service. This time includes time spent by me in the following activities:preparing for the visit, reviewing tests, obtaining and/or reviewing a separately obtained history, performing a medically appropriate examination and/or evaluation , counseling and educating the patient/family/caregiver, ordering medications, tests, or procedures, referring and communicating with other health care professionals , and documenting information in the medical record    Roberta Rodriguez PA-C      Note: Part of this note may be an electronic  transcription/translation of spoken language to printed text using the Dragon Dictation system.

## 2023-11-21 NOTE — LETTER
[facility]  Vaccine Consent Form    Patient Name:  Deanna Putnam  Patient :  1985     Vaccine(s) Ordered    Pneumococcal Conjugate Vaccine 20-Valent All        Screening Checklist  The following questions should be completed prior to vaccination. If you answer “yes” to any question, it does not necessarily mean you should not be vaccinated. It just means we may need to clarify or ask more questions. If a question is unclear, please ask your healthcare provider to explain it.    Yes No   Any fever or moderate to severe illness today (mild illness and/or antibiotic treatment are not contraindications)?     Do you have a history of a serious reaction to any previous vaccinations, such as anaphylaxis, encephalopathy within 7 days, Guillain-[place] syndrome within 6 weeks, seizure?     Have you received any live vaccine(s) (e.g MMR, JADYN) or any other vaccines in the last month (to ensure duplicate doses aren't given)?     Do you have an anaphylactic allergy to latex (DTaP, DTaP-IPV, Hep A, Hep B, MenB, RV, Td, Tdap), baker’s yeast (Hep B, HPV), polysorbates (RSV, nirsevimab, PCV 20, Rotavirrus, Tdap, Shingrix), or gelatin (JADYN, MMR)?     Do you have an anaphylactic allergy to neomycin (Rabies, JADYN, MMR, IPV, Hep A), polymyxin B (IPV), or streptomycin (IPV)?      Any cancer, leukemia, AIDS, or other immune system disorder? (JADYN, MMR, RV)     Do you have a parent, brother, or sister with an immune system problem (if immune competence of vaccine recipient clinically verified, can proceed)? (MMR, JADYN)     Any recent steroid treatments for >2 weeks, chemotherapy, or radiation treatment? (JADYN, MMR)     Have you received antibody-containing blood transfusions or IVIG in the past 11 months (recommended interval is dependent on product)? (MMR, JADYN)     Have you taken antiviral drugs (acyclovir, famciclovir, valacyclovir for JADYN) in the last 24 or 48 hours, respectively?      Are you pregnant or planning to  become pregnant within 1 month? (JADYN, MMR, HPV, IPV, MenB, Abrexvy; For Hep B- refer to Engerix-B; For RSV - Abrysvo is indicated for 32-36 weeks of pregnancy from September to January)     For infants, have you ever been told your child has had intussusception or a medical emergency involving obstruction of the intestine (Rotavirus)? If not for an infant, can skip this question.         *Ordering Physician/APC should be consulted if “yes” is checked by the patient or guardian above.      I have received, read, and understand the Vaccine Information Statement (VIS) for each vaccine ordered above.  I have considered my health status as well as the health status of my close contacts.  I have taken the opportunity to discuss my vaccine questions with my health care provider.   I have requested that the ordered vaccine(s) be given to me.  I understand the benefits and risks of the vaccines.  I understand that I should remain in the clinic for 15 minutes after receiving the vaccine(s).  _________________________________________________________  Signature of Patient or Parent/Legal Guardian ____________________  Date

## 2023-11-22 ENCOUNTER — APPOINTMENT (OUTPATIENT)
Dept: OTHER | Facility: HOSPITAL | Age: 38
End: 2023-11-22
Payer: COMMERCIAL

## 2023-11-22 ENCOUNTER — HOSPITAL ENCOUNTER (OUTPATIENT)
Dept: MAMMOGRAPHY | Facility: HOSPITAL | Age: 38
Discharge: HOME | End: 2023-11-22
Admitting: PHYSICIAN ASSISTANT
Payer: COMMERCIAL

## 2023-11-22 DIAGNOSIS — Z12.31 ENCOUNTER FOR SCREENING MAMMOGRAM FOR MALIGNANT NEOPLASM OF BREAST: ICD-10-CM

## 2023-11-22 PROCEDURE — 77063 BREAST TOMOSYNTHESIS BI: CPT

## 2023-11-22 PROCEDURE — 77067 SCR MAMMO BI INCL CAD: CPT

## 2023-11-27 DIAGNOSIS — E11.65 TYPE 2 DIABETES MELLITUS WITH HYPERGLYCEMIA, WITHOUT LONG-TERM CURRENT USE OF INSULIN: Primary | ICD-10-CM

## 2023-11-27 RX ORDER — SEMAGLUTIDE 0.68 MG/ML
0.25 INJECTION, SOLUTION SUBCUTANEOUS WEEKLY
Qty: 2 ML | Refills: 0 | Status: SHIPPED | OUTPATIENT
Start: 2023-11-27

## 2023-11-27 RX ORDER — ERGOCALCIFEROL 1.25 MG/1
50000 CAPSULE ORAL WEEKLY
Qty: 4 CAPSULE | Refills: 3 | Status: SHIPPED | OUTPATIENT
Start: 2023-11-27

## 2023-12-14 DIAGNOSIS — E11.65 TYPE 2 DIABETES MELLITUS WITH HYPERGLYCEMIA, WITHOUT LONG-TERM CURRENT USE OF INSULIN: ICD-10-CM

## 2023-12-14 RX ORDER — EMPAGLIFLOZIN 10 MG/1
10 TABLET, FILM COATED ORAL DAILY
Qty: 30 TABLET | Refills: 2 | Status: SHIPPED | OUTPATIENT
Start: 2023-12-14

## 2024-02-20 ENCOUNTER — PATIENT MESSAGE (OUTPATIENT)
Dept: INTERNAL MEDICINE | Facility: CLINIC | Age: 39
End: 2024-02-20
Payer: COMMERCIAL

## 2024-02-20 DIAGNOSIS — E11.65 TYPE 2 DIABETES MELLITUS WITH HYPERGLYCEMIA, WITHOUT LONG-TERM CURRENT USE OF INSULIN: ICD-10-CM

## 2024-02-20 RX ORDER — ROSUVASTATIN CALCIUM 20 MG/1
20 TABLET, COATED ORAL NIGHTLY
Qty: 90 TABLET | Refills: 3 | Status: SHIPPED | OUTPATIENT
Start: 2024-02-20

## 2024-02-20 RX ORDER — SEMAGLUTIDE 0.68 MG/ML
0.25 INJECTION, SOLUTION SUBCUTANEOUS WEEKLY
Qty: 2 ML | Refills: 0 | Status: SHIPPED | OUTPATIENT
Start: 2024-02-20

## 2024-02-20 NOTE — TELEPHONE ENCOUNTER
From: Deanna Putnam  To: Roberta Rodriguez  Sent: 2/20/2024 7:40 AM EST  Subject: Medicine and Appointments    Good morning!    I wanted to let you know why I’ve changed my appointment and asked for refills. I only have my work insurance now. Which means I have to pay co-pays and stuff. I didn’t want to not have my meds (which I’m doing better at taking regularly). There are no refills on the 2 meds I asked for a prescription refill. If you have any questions or concerns, please write back or call me [phone].    Thanks,    Deanna Putnam

## 2024-02-26 DIAGNOSIS — F33.42 RECURRENT MAJOR DEPRESSIVE DISORDER, IN FULL REMISSION: ICD-10-CM

## 2024-02-26 DIAGNOSIS — I10 ESSENTIAL HYPERTENSION: ICD-10-CM

## 2024-02-27 RX ORDER — ESCITALOPRAM OXALATE 10 MG/1
10 TABLET ORAL DAILY
Qty: 90 TABLET | Refills: 1 | Status: SHIPPED | OUTPATIENT
Start: 2024-02-27

## 2024-02-27 RX ORDER — AMLODIPINE BESYLATE 10 MG/1
10 TABLET ORAL DAILY
Qty: 90 TABLET | Refills: 1 | Status: SHIPPED | OUTPATIENT
Start: 2024-02-27

## 2024-02-27 NOTE — TELEPHONE ENCOUNTER
Rx Refill Note  Requested Prescriptions     Pending Prescriptions Disp Refills    amLODIPine (NORVASC) 10 MG tablet [Pharmacy Med Name: AMLODIPINE BESYLATE 10MG TABLETS] 90 tablet 1     Sig: TAKE 1 TABLET BY MOUTH DAILY    escitalopram (LEXAPRO) 10 MG tablet [Pharmacy Med Name: ESCITALOPRAM 10MG TABLETS] 90 tablet 1     Sig: TAKE 1 TABLET BY MOUTH DAILY      Last office visit with prescribing clinician: 11/21/2023   Last telemedicine visit with prescribing clinician: Visit date not found   Next office visit with prescribing clinician: 3/12/2024                         Would you like a call back once the refill request has been completed: [] Yes [] No    If the office needs to give you a call back, can they leave a voicemail: [] Yes [] No    Vilma Dupree LPN  02/27/24, 08:53 EST

## 2024-03-12 DIAGNOSIS — I10 ESSENTIAL HYPERTENSION: ICD-10-CM

## 2024-03-12 DIAGNOSIS — J30.9 ALLERGIC RHINITIS, UNSPECIFIED SEASONALITY, UNSPECIFIED TRIGGER: ICD-10-CM

## 2024-03-12 RX ORDER — FLUTICASONE PROPIONATE 50 MCG
SPRAY, SUSPENSION (ML) NASAL
Qty: 48 G | Refills: 1 | Status: SHIPPED | OUTPATIENT
Start: 2024-03-12

## 2024-03-12 RX ORDER — LISINOPRIL 40 MG/1
40 TABLET ORAL DAILY
Qty: 90 TABLET | Refills: 1 | Status: SHIPPED | OUTPATIENT
Start: 2024-03-12

## 2024-03-12 RX ORDER — CHLORTHALIDONE 50 MG/1
50 TABLET ORAL DAILY
Qty: 90 TABLET | Refills: 1 | Status: SHIPPED | OUTPATIENT
Start: 2024-03-12

## 2024-04-11 DIAGNOSIS — J30.9 ALLERGIC RHINITIS, UNSPECIFIED SEASONALITY, UNSPECIFIED TRIGGER: ICD-10-CM

## 2024-04-11 RX ORDER — CETIRIZINE HYDROCHLORIDE 10 MG/1
10 TABLET ORAL DAILY
Qty: 90 TABLET | Refills: 1 | Status: SHIPPED | OUTPATIENT
Start: 2024-04-11

## 2024-06-24 DIAGNOSIS — F33.42 RECURRENT MAJOR DEPRESSIVE DISORDER, IN FULL REMISSION: ICD-10-CM

## 2024-06-24 DIAGNOSIS — J30.9 ALLERGIC RHINITIS, UNSPECIFIED SEASONALITY, UNSPECIFIED TRIGGER: ICD-10-CM

## 2024-06-24 DIAGNOSIS — E11.65 TYPE 2 DIABETES MELLITUS WITH HYPERGLYCEMIA, WITHOUT LONG-TERM CURRENT USE OF INSULIN: ICD-10-CM

## 2024-06-24 DIAGNOSIS — I10 ESSENTIAL HYPERTENSION: ICD-10-CM

## 2024-06-24 RX ORDER — CHLORTHALIDONE 50 MG/1
50 TABLET ORAL DAILY
Qty: 90 TABLET | Refills: 1 | Status: SHIPPED | OUTPATIENT
Start: 2024-06-24

## 2024-06-24 RX ORDER — BUDESONIDE AND FORMOTEROL FUMARATE DIHYDRATE 160; 4.5 UG/1; UG/1
2 AEROSOL RESPIRATORY (INHALATION) 2 TIMES DAILY
Qty: 10.2 EACH | Refills: 3 | Status: SHIPPED | OUTPATIENT
Start: 2024-06-24

## 2024-06-24 RX ORDER — ESCITALOPRAM OXALATE 10 MG/1
10 TABLET ORAL DAILY
Qty: 90 TABLET | Refills: 1 | Status: SHIPPED | OUTPATIENT
Start: 2024-06-24

## 2024-06-24 RX ORDER — LISINOPRIL 40 MG/1
40 TABLET ORAL DAILY
Qty: 90 TABLET | Refills: 1 | Status: SHIPPED | OUTPATIENT
Start: 2024-06-24

## 2024-06-24 RX ORDER — ERGOCALCIFEROL 1.25 MG/1
50000 CAPSULE ORAL WEEKLY
Qty: 12 CAPSULE | Refills: 1 | Status: SHIPPED | OUTPATIENT
Start: 2024-06-24

## 2024-06-24 RX ORDER — AMLODIPINE BESYLATE 10 MG/1
10 TABLET ORAL DAILY
Qty: 90 TABLET | Refills: 1 | Status: SHIPPED | OUTPATIENT
Start: 2024-06-24

## 2024-06-24 RX ORDER — CETIRIZINE HYDROCHLORIDE 10 MG/1
10 TABLET ORAL DAILY
Qty: 90 TABLET | Refills: 1 | Status: SHIPPED | OUTPATIENT
Start: 2024-06-24

## 2024-06-24 RX ORDER — ROSUVASTATIN CALCIUM 20 MG/1
20 TABLET, COATED ORAL NIGHTLY
Qty: 90 TABLET | Refills: 3 | Status: SHIPPED | OUTPATIENT
Start: 2024-06-24

## 2024-08-02 NOTE — PROGRESS NOTES
[facility] Internal Medicine    Deanna Putnam  1985   [id number]      Patient Care Team:  Roberta Rodriguez PA-C as PCP - General (Internal Medicine)  Leann Alejo DO (Inactive) as Consulting Physician (Obstetrics and Gynecology)  Jorge Alberto Arellano MD as Consulting Physician (Obstetrics and Gynecology)  Mentzer, Elizabeth Kathleen, PA (Physician Assistant)  Nolan Biswas MD as Consulting Physician (Gastroenterology)  Medhat Marinelli MD as Consulting Physician (Cardiology)  Luis Wolf MD as Consulting Physician (Pulmonary Disease)    Chief Complaint::   Chief Complaint   Patient presents with    Depression    Diabetes    Fatigue     Hasn't been sleeping well      HPI  Deanna is a 38-year-old female date of birth 1985 who presents today for follow-up.  Past medical history significant for uncontrolled type 2 diabetes, morbid obesity, hypertension, vitamin D deficiency, and anxiety and depression. She is a  with [employer]. Full time, she works 8am -5pm.  Daughter is 7 years old. She is getting ready to move to a new town home and is moving on September 8th. She is pursuing masters in family and marriage therapy.  Just celebrated 38th birthday.  Deanna  has struggled with compliance.  Last month, increased amlodipine to 10 mg daily, Jardiance 10 mg in the morning, and Lexapro 10 mg at night.  She now reports significant improvement in anxiety depression, overall feeling better.  Blood pressure much improved today.  She denies headaches, chest pain, shortness of breath, or palpitations.      Patient Active Problem List   Diagnosis    Intramural and subserous leiomyoma of uterus    Essential hypertension    Morbid obesity with body mass index (BMI) of 40.0 to 49.9    Recurrent major depressive disorder, in full remission    Mild intermittent asthma without complication    PCOS (polycystic ovarian syndrome)    Arthralgia of  both knees    Screening, lipid    Bloating    Menorrhagia with regular cycle    Hip pain, bilateral    Vitamin D deficiency    Hypertensive emergency    Hyponatremia    Medically noncompliant    Anemia, iron deficiency    Diabetes    Allergic rhinitis    Left hip pain    Physical deconditioning    Ankle edema, bilateral    HASEEB (obstructive sleep apnea) - possible    Excessive daytime sleepiness    Routine medical exam    Mixed hyperlipidemia    Diabetic eye exam    Encounter for screening mammogram for malignant neoplasm of breast        Past Medical History:   Diagnosis Date    Allergic     Anemia     Arthritis of back January 2011    Asthma     Bursitis of hip May 2022    Depression     Diabetes mellitus     Hypertension     Obesity     HASEEB (obstructive sleep apnea)     Osteoarthritis of back     Prediabetes        Past Surgical History:   Procedure Laterality Date    CHOLECYSTECTOMY  2018    DIAGNOSTIC LAPAROSCOPY  12/13/2018    ENDOMETRIAL ABLATION  2018    Did not get endometriosis out    EYE SURGERY  2015    eyelid lift    GALLBLADDER SURGERY  04/2018    TONSILLECTOMY      WISDOM TOOTH EXTRACTION         Family History   Problem Relation Age of Onset    Endometrial cancer Mother 59    Diabetes Mother     Hypertension Mother     Cancer Mother     Hyperlipidemia Mother     Miscarriages / Stillbirths Mother     Vision loss Mother     Breast cancer Maternal Grandmother 80    Colon cancer Maternal Grandmother 90    Arthritis Maternal Grandmother     Birth defects Maternal Grandmother     Diabetes Maternal Grandmother     Cancer Maternal Grandmother     Hyperlipidemia Maternal Grandmother     Vision loss Maternal Grandmother     Breast cancer Maternal Aunt 60    Cancer Maternal Aunt     Diabetes Father     Hypertension Father     Hyperlipidemia Father     Diabetes Paternal Grandmother     Hypertension Paternal Grandmother     Hyperlipidemia Paternal Grandmother     Hypertension Paternal Grandfather     Diabetes  "Paternal Grandfather     Hyperlipidemia Paternal Grandfather     Uterine cancer Neg Hx     Ovarian cancer Neg Hx        Social History     Socioeconomic History    Marital status: Single   Tobacco Use    Smoking status: Never    Smokeless tobacco: Never   Vaping Use    Vaping Use: Never used   Substance and Sexual Activity    Alcohol use: Yes     Alcohol/week: 2.0 standard drinks     Types: 2 Glasses of wine per week     Comment: rarely    Drug use: No    Sexual activity: Not Currently     Partners: Female, Male     Birth control/protection: Condom       Allergies   Allergen Reactions    Eggs Or Egg-Derived Products Nausea And Vomiting    Mold Extract [Trichophyton] Unknown (See Comments)     unknown       Review of Systems   Constitutional:  Negative for activity change, appetite change, diaphoresis, fatigue, unexpected weight gain and unexpected weight loss.   HENT:  Negative for hearing loss.    Eyes:  Negative for visual disturbance.   Respiratory:  Negative for chest tightness and shortness of breath.    Cardiovascular:  Negative for chest pain, palpitations and leg swelling.   Gastrointestinal:  Negative for abdominal pain, blood in stool, GERD and indigestion.   Endocrine: Negative for cold intolerance and heat intolerance.   Genitourinary:  Negative for dysuria and hematuria.   Musculoskeletal:  Negative for arthralgias and myalgias.   Skin:  Negative for skin lesions.   Neurological:  Negative for tremors, seizures, syncope, speech difficulty, weakness, headache, memory problem and confusion.   Hematological:  Does not bruise/bleed easily.   Psychiatric/Behavioral:  Negative for sleep disturbance and depressed mood. The patient is not nervous/anxious.       Vital Signs  Vitals:    09/21/23 0901   BP: 120/96   BP Location: Left arm   Patient Position: Sitting   Cuff Size: Adult   Pulse: 118   Temp: 98.7 °F (37.1 °C)   TempSrc: Infrared   SpO2: 99%   Weight: 108 kg (237 lb 9.6 oz)   Height: 160 cm (63\") "   PainSc: 0-No pain     Body mass index is 42.09 kg/m².        Advance Care Planning   ACP discussion was held with the patient during this visit. Patient does not have an advance directive, information provided.       Current Outpatient Medications:     amLODIPine (NORVASC) 10 MG tablet, Take 1 tablet by mouth Daily., Disp: 90 tablet, Rfl: 1    cetirizine (zyrTEC) 10 MG tablet, Take 1 tablet by mouth Daily., Disp: 90 tablet, Rfl: 1    chlorthalidone (HYGROTEN) 50 MG tablet, Take 1 tablet by mouth Daily., Disp: 90 tablet, Rfl: 1    empagliflozin (Jardiance) 10 MG tablet tablet, Take 1 tablet by mouth Daily., Disp: 30 tablet, Rfl: 2    escitalopram (Lexapro) 10 MG tablet, Take 1 tablet by mouth Daily., Disp: 90 tablet, Rfl: 1    fluticasone (FLONASE) 50 MCG/ACT nasal spray, 2 sprays into the nostril(s) as directed by provider Daily., Disp: 48 mL, Rfl: 1    lisinopril (PRINIVIL,ZESTRIL) 40 MG tablet, Take 1 tablet by mouth Daily., Disp: 90 tablet, Rfl: 1    rosuvastatin (CRESTOR) 20 MG tablet, Take 1 tablet by mouth Every Night., Disp: 30 tablet, Rfl: 6    Symbicort 160-4.5 MCG/ACT inhaler, TAKE 2 PUFFS BY MOUTH TWICE A DAY, Disp: 10.2 each, Rfl: 3    Physical Exam  Vitals reviewed.   Constitutional:       Appearance: Normal appearance. She is well-developed. She is obese.   HENT:      Head: Normocephalic and atraumatic.      Right Ear: Hearing, tympanic membrane, ear canal and external ear normal.      Left Ear: Hearing, tympanic membrane, ear canal and external ear normal.      Nose: Nose normal.      Mouth/Throat:      Pharynx: Uvula midline.   Eyes:      General: Lids are normal.      Conjunctiva/sclera: Conjunctivae normal.      Pupils: Pupils are equal, round, and reactive to light.   Cardiovascular:      Rate and Rhythm: Normal rate and regular rhythm.      Heart sounds: Normal heart sounds.   Pulmonary:      Effort: Pulmonary effort is normal.      Breath sounds: Normal breath sounds.   Abdominal:       General: Abdomen is flat. Bowel sounds are normal.      Palpations: Abdomen is soft.      Tenderness: There is no right CVA tenderness or left CVA tenderness.   Musculoskeletal:         General: Normal range of motion.      Cervical back: Full passive range of motion without pain, normal range of motion and neck supple.   Skin:     General: Skin is warm and dry.   Neurological:      General: No focal deficit present.      Mental Status: She is alert and oriented to person, place, and time. Mental status is at baseline.      Deep Tendon Reflexes: Reflexes are normal and symmetric.   Psychiatric:         Speech: Speech normal.         Behavior: Behavior normal.         Thought Content: Thought content normal.         Judgment: Judgment normal.        ACE III MINI            Results Review:    Recent Results (from the past 672 hour(s))   POC Glycosylated Hemoglobin (Hb A1C)    Collection Time: 08/24/23 11:31 AM    Specimen: Blood   Result Value Ref Range    Hemoglobin A1C 10.2 %    Lot Number [id number]     Expiration Date 4/13/25    Comprehensive Metabolic Panel    Collection Time: 08/24/23 11:35 AM    Specimen: Blood   Result Value Ref Range    Glucose 241 (H) 65 - 99 mg/dL    BUN 12 6 - 20 mg/dL    Creatinine 0.77 0.57 - 1.00 mg/dL    Sodium 136 136 - 145 mmol/L    Potassium 3.9 3.5 - 5.2 mmol/L    Chloride 96 (L) 98 - 107 mmol/L    CO2 27.6 22.0 - 29.0 mmol/L    Calcium 9.5 8.6 - 10.5 mg/dL    Total Protein 7.6 6.0 - 8.5 g/dL    Albumin 4.3 3.5 - 5.2 g/dL    ALT (SGPT) 20 1 - 33 U/L    AST (SGOT) 17 1 - 32 U/L    Alkaline Phosphatase 87 39 - 117 U/L    Total Bilirubin 0.4 0.0 - 1.2 mg/dL    Globulin 3.3 gm/dL    A/G Ratio 1.3 g/dL    BUN/Creatinine Ratio 15.6 7.0 - 25.0    Anion Gap 12.4 5.0 - 15.0 mmol/L    eGFR 100.8 >60.0 mL/min/1.73   Lipid Panel    Collection Time: 08/24/23 11:35 AM    Specimen: Blood   Result Value Ref Range    Total Cholesterol 192 0 - 200 mg/dL    Triglycerides 127 0 - 150 mg/dL    HDL  Cholesterol 45 40 - 60 mg/dL    LDL Cholesterol  124 (H) 0 - 100 mg/dL    VLDL Cholesterol 23 5 - 40 mg/dL    LDL/HDL Ratio 2.70    TSH    Collection Time: 08/24/23 11:35 AM    Specimen: Blood   Result Value Ref Range    TSH 0.961 0.270 - 4.200 uIU/mL   T4, Free    Collection Time: 08/24/23 11:35 AM    Specimen: Blood   Result Value Ref Range    Free T4 1.38 0.93 - 1.70 ng/dL   T3, Free    Collection Time: 08/24/23 11:35 AM    Specimen: Blood   Result Value Ref Range    T3, Free 3.28 2.00 - 4.40 pg/mL   Vitamin B12    Collection Time: 08/24/23 11:35 AM    Specimen: Blood   Result Value Ref Range    Vitamin B-12 432 211 - 946 pg/mL   Vitamin D,25-Hydroxy    Collection Time: 08/24/23 11:35 AM    Specimen: Blood   Result Value Ref Range    25 Hydroxy, Vitamin D 15.5 (L) 30.0 - 100.0 ng/ml   CBC Auto Differential    Collection Time: 08/24/23 11:35 AM    Specimen: Blood   Result Value Ref Range    WBC 8.10 3.40 - 10.80 10*3/mm3    RBC 4.55 3.77 - 5.28 10*6/mm3    Hemoglobin 11.0 (L) 12.0 - 15.9 g/dL    Hematocrit 35.1 34.0 - 46.6 %    MCV 77.1 (L) 79.0 - 97.0 fL    MCH 24.2 (L) 26.6 - 33.0 pg    MCHC 31.3 (L) 31.5 - 35.7 g/dL    RDW 15.1 12.3 - 15.4 %    RDW-SD 41.1 37.0 - 54.0 fl    MPV 10.8 6.0 - 12.0 fL    Platelets 484 (H) 140 - 450 10*3/mm3    Neutrophil % 57.2 42.7 - 76.0 %    Lymphocyte % 34.3 19.6 - 45.3 %    Monocyte % 6.8 5.0 - 12.0 %    Eosinophil % 0.7 0.3 - 6.2 %    Basophil % 0.6 0.0 - 1.5 %    Immature Grans % 0.4 0.0 - 0.5 %    Neutrophils, Absolute 4.63 1.70 - 7.00 10*3/mm3    Lymphocytes, Absolute 2.78 0.70 - 3.10 10*3/mm3    Monocytes, Absolute 0.55 0.10 - 0.90 10*3/mm3    Eosinophils, Absolute 0.06 0.00 - 0.40 10*3/mm3    Basophils, Absolute 0.05 0.00 - 0.20 10*3/mm3    Immature Grans, Absolute 0.03 0.00 - 0.05 10*3/mm3    nRBC 0.0 0.0 - 0.2 /100 WBC     Procedures    Medication Review: Medications reviewed and noted    Social History     Socioeconomic History    Marital status: Single   Tobacco Use     Smoking status: Never    Smokeless tobacco: Never   Vaping Use    Vaping Use: Never used   Substance and Sexual Activity    Alcohol use: Yes     Alcohol/week: 2.0 standard drinks     Types: 2 Glasses of wine per week     Comment: rarely    Drug use: No    Sexual activity: Not Currently     Partners: Female, Male     Birth control/protection: Condom        Assessment/Plan:    Diagnoses and all orders for this visit:    1. Essential hypertension (Primary)  Overview:  Continue amlodipine 10 mg and lisinopril 40 mg daily.  Significant improvement in blood pressure.      2. Mixed hyperlipidemia  Overview:  Try and cut back on processed foods and fatty foods in the diet.  Continue rosuvastatin 20 mg nightly.      3. Morbid obesity with body mass index (BMI) of 40.0 to 49.9  Overview:  She has lost several pounds since her last appointment.      4. Type 2 diabetes mellitus with hyperglycemia, without long-term current use of insulin  Overview:  Point-of-care A1c in the office on 8/9/2023 10.2%.  She will resume Jardiance 10 mg immediately.  Intolerance to metformin.  Close follow-up warranted.        5. Recurrent major depressive disorder, in full remission  Overview:  Improvement with lexapro 10mg daily.           Patient Instructions   BMI for Adults  What is BMI?  Body mass index (BMI) is a number that is calculated from a person's weight and height. BMI can help estimate how much of a person's weight is composed of fat. BMI does not measure body fat directly. Rather, it is an alternative to procedures that directly measure body fat, which can be difficult and expensive.  BMI can help identify people who may be at higher risk for certain medical problems.  What are BMI measurements used for?  BMI is used as a screening tool to identify possible weight problems. It helps determine whether a person is obese, overweight, a healthy weight, or underweight.  BMI is useful for:  Identifying a weight problem that may be  "related to a medical condition or may increase the risk for medical problems.  Promoting changes, such as changes in diet and exercise, to help reach a healthy weight. BMI screening can be repeated to see if these changes are working.  How is BMI calculated?  BMI involves measuring your weight in relation to your height. Both height and weight are measured, and the BMI is calculated from those numbers. This can be done either in English (U[place] or metric measurements. Note that charts and online BMI calculators are available to help you find your BMI quickly and easily without having to do these calculations yourself.  To calculate your BMI in English (U.S[place] measurements:    Measure your weight in pounds (lb).  Multiply the number of pounds by 703.  For example, for a person who weighs 180 lb, multiply that number by 703, which equals 126,540.  Measure your height in inches. Then multiply that number by itself to get a measurement called \"inches squared.\"  For example, for a person who is 70 inches tall, the \"inches squared\" measurement is 70 inches x 70 inches, which equals 4,900 inches squared.  Divide the total from step 2 (number of lb x 703) by the total from step 3 (inches squared): 126,540 ÷ 4,900 = 25.8. This is your BMI.  To calculate your BMI in metric measurements:  Measure your weight in kilograms (kg).  Measure your height in meters (m). Then multiply that number by itself to get a measurement called \"meters squared.\"  For example, for a person who is 1.75 m tall, the \"meters squared\" measurement is 1.75 m x 1.75 m, which is equal to 3.1 meters squared.  Divide the number of kilograms (your weight) by the meters squared number. In this example: 70 ÷ 3.1 = 22.6. This is your BMI.  What do the results mean?  BMI charts are used to identify whether you are underweight, normal weight, overweight, or obese. The following guidelines will be used:  Underweight: BMI less than 18.5.  Normal weight: BMI between "  Fall complicated by acute comminuted nondisplaced fracture of the left proximal tibial shaft in addition to nondisplaced fibular neck fracture  - AFib on Novel Oral Anti-Coagulant (NOAC)   - COPD, actively smoking - O2 dependent   - Anxiety   - DM / hypothyroidism   - Limited mobility - wheelchair bound   - Recently admitted for left hip pain, imaging (7/22) were negative for fracture     PLAN  - bed rest   - pain management (iv morphine for severe pain)   - Ortho team consulted for eval , if operative intervention is needed, will need to be off Novel Oral Anti-Coagulant (NOAC) for 48 hours at least   - May need CT scan of left lower extremity (pending ortho eval)   - Insulin sliding scale   - rest of medication as per home regimen   - F/U BUN, Crea  18.5 and 24.9.  Overweight: BMI between 25 and 29.9.  Obese: BMI of 30 or above.  Keep these notes in mind:  Weight includes both fat and muscle, so someone with a muscular build, such as an athlete, may have a BMI that is higher than 24.9. In cases like these, BMI is not an accurate measure of body fat.  To determine if excess body fat is the cause of a BMI of 25 or higher, further assessments may need to be done by a health care provider.  BMI is usually interpreted in the same way for men and women.  Where to find more information  For more information about BMI, including tools to quickly calculate your BMI, go to these websites:  Centers for Disease Control and Prevention: www.cdc.gov  American Heart Association: www.heart.org  National Heart, Lung, and Blood [place]: www.nhlbi.nih.gov  Summary  Body mass index (BMI) is a number that is calculated from a person's weight and height.  BMI may help estimate how much of a person's weight is composed of fat. BMI can help identify those who may be at higher risk for certain medical problems.  BMI can be measured using English measurements or metric measurements.  BMI charts are used to identify whether you are underweight, normal weight, overweight, or obese.  This information is not intended to replace advice given to you by your health care provider. Make sure you discuss any questions you have with your health care provider.  Document Revised: 09/09/2020 Document Reviewed: 07/17/2020  [employer] Patient Education © 2023 [employer]       Plan of care reviewed with patient at the conclusion of today's visit. Education was provided regarding diagnosis, management, and any prescribed or recommended OTC medications.Patient verbalizes understanding of and agreement with management plan.         I have seen Deanna on this date of service:preparing for the visit, reviewing tests, obtaining and/or reviewing a separately obtained history, performing a medically  appropriate examination and/or evaluation , counseling and educating the patient/family/caregiver, ordering medications, tests, or procedures, referring and communicating with other health care professionals , and documenting information in the medical record    Roberta Rodriguez PA-C      Note: Part of this note may be an electronic transcription/translation of spoken language to printed text using the Dragon Dictation system.     76-year-old female with history of A-fib on Eliquis, COPD, CHF, GERD, hypothyroidism, Parkinson's, status post pacemaker presents ED status post fall.    As per Patient states that she was walking to the bathroom and her feet fell from under her, states that her feet hit the tub and her left leg went underneath her. Pt reports pain to the L hip and L upper led   off note : Patient was recently admitted to this hospital for atraumatic left sided hip pain with hypokalemia and LONI.  Was discharged on July 31, 2024.  Denies any head to head, denies any LOC, any difficulty breathing, lightheadedness, chest pain, or palpitations prior to falling    In Ed vitally stable on 3l of NC with sat of 94 %   on labs   on imaging     Left knee and tib-fib  Comminuted nondisplaced fracture of the proximal tibial shaft.   Nondisplaced fibular shaft/neck fracture is noted as well. Apparent   fracture deformity of the medial tibial eminence. Small to moderate knee   joint effusion. Superior patella traction enthesophyte. Vascular   calcifications      rcvd morphine 4 mg stat       admitted for further workup       #Fall complicated by acute comminuted nondisplaced fracture of the left proximal tibial shaft in addition to nondisplaced fibular neck fracture  -In Ed vitally stable on 3l of NC with sat of 94 %   -On Xray Left knee and tib-fib  Comminuted nondisplaced fracture of the proximal tibial shaft.   Nondisplaced fibular shaft/neck fracture is noted as well. Apparent   fracture deformity of the medial tibial eminence. Small to moderate knee   joint effusion. Superior patella traction enthesophyte. Vascular   calcifications                    - AFib on Novel Oral Anti-Coagulant (NOAC)   - COPD, actively smoking - O2 dependent   - Anxiety   - DM / hypothyroidism   - Limited mobility - wheelchair bound   - Recently admitted for left hip pain, imaging (7/22) were negative for fracture     PLAN  - bed rest   - pain management (iv morphine for severe pain)   - Ortho team consulted for eval , if operative intervention is needed, will need to be off Novel Oral Anti-Coagulant (NOAC) for 48 hours at least   - May need CT scan of left lower extremity (pending ortho eval)   - Insulin sliding scale   - rest of medication as per home regimen   - F/U BUN, Crea  76-year-old female with history of A-fib on Eliquis, COPD, CHF, GERD, hypothyroidism, Parkinson's, status post pacemaker presents ED status post fall.    As per Patient states that she was walking to the bathroom and her feet fell from under her, states that her feet hit the tub and her left leg went underneath her. Pt reports pain to the L hip and L upper led   off note : Patient was recently admitted to this hospital for atraumatic left sided hip pain with hypokalemia and LONI.  Was discharged on July 31, 2024.  Denies any head to head, denies any LOC, any difficulty breathing, lightheadedness, chest pain, or palpitations prior to falling        #Fall complicated by acute comminuted nondisplaced fracture of the left proximal tibial shaft in addition to nondisplaced fibular neck fracture  -In Ed vitally stable on 3l of NC with sat of 94 %   -On Xray Left knee and tib-fib  Comminuted nondisplaced fracture of the proximal tibial shaft.   Nondisplaced fibular shaft/neck fracture is noted as well. Apparent   fracture deformity of the medial tibial eminence. Small to moderate knee   joint effusion. Superior patella traction enthesophyte. Vascular   calcifications                    - AFib on Novel Oral Anti-Coagulant (NOAC)   - COPD, actively smoking - O2 dependent   - Anxiety   - DM / hypothyroidism   - Limited mobility - wheelchair bound   - Recently admitted for left hip pain, imaging (7/22) were negative for fracture     PLAN  - bed rest   - pain management (iv morphine for severe pain)   - Ortho team consulted for eval , if operative intervention is needed, will need to be off Novel Oral Anti-Coagulant (NOAC) for 48 hours at least   - May need CT scan of left lower extremity (pending ortho eval)   - Insulin sliding scale   - rest of medication as per home regimen   - F/U BUN, Crea  76-year-old female with history of A-fib on Eliquis, COPD, CHF, GERD, hypothyroidism, Parkinson's, status post pacemaker presents ED status post fall.    As per Patient states that she was walking to the bathroom and her feet fell from under her, states that her feet hit the tub and her left leg went underneath her. Pt reports pain to the L hip and L upper led   off note : Patient was recently admitted to this hospital for atraumatic left sided hip pain with hypokalemia and LONI.  Was discharged on July 31, 2024.  Denies any head to head, denies any LOC, any difficulty breathing, lightheadedness, chest pain, or palpitations prior to falling        #Fall complicated by acute comminuted nondisplaced fracture of the left proximal tibial shaft in addition to nondisplaced fibular neck fracture  -In Ed vitally stable on 3l of NC with sat of 94 %   -On Xray Left knee and tib-fib  Comminuted nondisplaced fracture of the proximal tibial shaft.   Nondisplaced fibular shaft/neck fracture is noted as well. Apparent   fracture deformity of the medial tibial eminence. Small to moderate knee   joint effusion. Superior patella traction enthesophyte. Vascular   calcifications                    - AFib on Novel Oral Anti-Coagulant (NOAC)   - COPD, actively smoking - O2 dependent   - Anxiety   - DM / hypothyroidism   - Limited mobility - wheelchair bound   - Recently admitted for left hip pain, imaging (7/22) were negative for fracture     PLAN  - bed rest   - pain management (iv morphine for severe pain)   - Ortho team consulted for eval , if operative intervention is needed, will need to be off Novel Oral Anti-Coagulant (NOAC) for 48 hours at least   - May need CT scan of left lower extremity (pending ortho eval)   - Insulin sliding scale   - rest of medication as per home regimen   - F/U BUN, Crea             MISC:  GI ppx: PPI   DVT ppx: lovenox full Ac   Diet: DASH   Activity:ATT  76-year-old female with history of A-fib on Eliquis, COPD, CHF, GERD, hypothyroidism, Parkinson's, status post pacemaker presents ED status post fall.    As per Patient states that she was walking to the bathroom and her feet fell from under her, states that her feet hit the tub and her left leg went underneath her. Pt reports pain to the L hip and L upper led   off note : Patient was recently admitted to this hospital for atraumatic left sided hip pain with hypokalemia and LONI.  Was discharged on July 31, 2024.  Denies any head to head, denies any LOC, any difficulty breathing, lightheadedness, chest pain, or palpitations prior to falling        #Fall complicated by acute comminuted nondisplaced fracture of the left proximal tibial shaft in addition to nondisplaced fibular neck fracture  -In Ed vitally stable on 3l of NC with sat of 94 %   -On Xray Left knee and tib-fib  Comminuted nondisplaced fracture of the proximal tibial shaft.   Nondisplaced fibular shaft/neck fracture is noted as well. Apparent   fracture deformity of the medial tibial eminence. Small to moderate knee   joint effusion. Superior patella traction enthesophyte. Vascular   calcifications                    - AFib on Novel Oral Anti-Coagulant (NOAC)   - COPD, actively smoking - O2 dependent   - Anxiety   - DM / hypothyroidism   - Limited mobility - wheelchair bound   - Recently admitted for left hip pain, imaging (7/22) were negative for fracture     PLAN  - bed rest   - pain management (iv morphine for severe pain)   - Ortho team consulted for eval , if operative intervention is needed, will need to be off Novel Oral Anti-Coagulant (NOAC) for 48 hours at least   - May need CT scan of left lower extremity (pending ortho eval)   - Insulin sliding scale   - rest of medication as per home regimen   - F/U BUN, Crea           #DM2  SSI   -monitor FS goal 140-180    #COPD   #Chronic respirataory failure on 3-4L NC O2  -c/w inhalers    #Chronic Afib  -c/w xarelto, amiodarone    #Chronic HFpEF  #Hx Hypotension  -c/w midodrine q8    #GERD  -c/w pepcid    #Hypothyroidism  -c/w synthroid            MISC:  GI ppx: PPI   DVT ppx: lovenox full Ac   Diet: DASH   Activity:ATT   76-year-old female with history of A-fib on Eliquis, COPD, CHF, GERD, hypothyroidism, Parkinson's, status post pacemaker presents ED status post fall.    As per Patient states that she was walking to the bathroom and her feet fell from under her, states that her feet hit the tub and her left leg went underneath her. Pt reports pain to the L hip and L upper leg   Pt is wheel chair bound at baseline.     off note : Patient was recently admitted to this hospital for atraumatic left sided hip pain with hypokalemia and LONI.  Was discharged on July 31, 2024.  Denies any head to head, denies any LOC, any difficulty breathing, lightheadedness, chest pain, or palpitations prior to falling      #DIverticulitis ?   -CTA/P  showed :  Sigmoid diverticulosis with adjacent inflammation consistent with   diverticulitis.   Nonspecific rectal wall wall thickening. Direct visualization   recommended on a nonurgent basis.      #Fall complicated by acute comminuted nondisplaced fracture of the left proximal tibial shaft in addition to nondisplaced fibular neck fracture  -In Ed vitally stable on 3l of NC with sat of 94 %   -On  Xray of Left knee and tib-fib  Comminuted nondisplaced fracture of the proximal tibial shaft.   Nondisplaced fibular shaft/neck fracture is noted as well. Apparent   fracture deformity of the medial tibial eminence. Small to moderate knee   joint effusion. Superior patella traction enthesophyte. Vascular   calcifications  -pt is wheel chair bound on baseline   PLAN   -pain control ,started on Morphine 4mg TID (if pain stays uncontrol can go up as Q4hrs)   - bed rest   - f/u Ortho team consulted for eval , if operative intervention is needed can stop Lovenox   - f/u  CT scan of left lower extremity for possible hematoma (can stop AC if thr is hematoma or else keep patient on full anticoagulation )  -f/u Xray ankle                     - AFib on Novel Oral Anti-Coagulant (NOAC)   - COPD, actively smoking - O2 dependent   - Anxiety   - DM / hypothyroidism   - Limited mobility - wheelchair bound   - Recently admitted for left hip pain, imaging (7/22) were negative for fracture     PLAN  - bed rest   - pain management (iv morphine for severe pain)   - Ortho team consulted for eval , if operative intervention is needed, will need to be off Novel Oral Anti-Coagulant (NOAC) for 48 hours at least   - May need CT scan of left lower extremity (pending ortho eval)   - Insulin sliding scale   - rest of medication as per home regimen   - F/U BUN, Crea           #DM2  SSI   -monitor FS goal 140-180    #COPD   #Chronic respirataory failure on 3-4L NC O2  -c/w inhalers    #Chronic Afib  -c/w xarelto, amiodarone    #Chronic HFpEF  #Hx Hypotension  -c/w midodrine q8    #GERD  -c/w pepcid    #Hypothyroidism  -c/w synthroid            MISC:  GI ppx: PPI   DVT ppx: lovenox full Ac   Diet: DASH   Activity:ATT   76-year-old female with history of A-fib on Eliquis, COPD, CHF, GERD, hypothyroidism, Parkinson's, status post pacemaker presents ED status post fall.    As per Patient states that she was walking to the bathroom and her feet fell from under her, states that her feet hit the tub and her left leg went underneath her. Pt reports pain to the L hip and L upper leg   Pt is wheel chair bound at baseline.   Denies any head to head, denies any LOC, any difficulty breathing, lightheadedness, chest pain, or palpitations prior to falling      #Fall complicated by acute comminuted nondisplaced fracture of the left proximal tibial shaft in addition to nondisplaced fibular neck fracture  -In Ed vitally stable on 3l of NC with sat of 94 %   -On  Xray of Left knee and tib-fib  Comminuted nondisplaced fracture of the proximal tibial shaft.   Nondisplaced fibular shaft/neck fracture is noted as well. Apparent   fracture deformity of the medial tibial eminence. Small to moderate knee   joint effusion. Superior patella traction enthesophyte. Vascular   calcifications  -pt is wheel chair bound on baseline   PLAN   -pain control ,started on Morphine 4mg TID (if pain stays uncontrol can go up as Q4hrs)   - bed rest   - f/u Ortho team consulted for eval , if operative intervention is needed can stop Lovenox   - f/u  CT scan of left lower extremity for possible hematoma (can stop AC if thr is hematoma or else keep patient on full anticoagulation ie Lovenox )  -f/u Xray ankle         # Diverticulitis ?   -CTA/P  showed :  Sigmoid diverticulosis with adjacent inflammation consistent with   diverticulitis.   Nonspecific rectal wall wall thickening. Direct visualization   recommended on a nonurgent basis        #DM2  SSI   -monitor FS goal 140-180    #COPD   #Chronic respiratory failure on 3-4L NC O2  -c/w inhalers    #Chronic Afib  -c/w xarelto, amiodarone  will start  on Lovenox here     #Chronic HFpEF  #Hx Hypotension  -c/w midodrine q8    #GERD  -c/w pepcid    #Hypothyroidism  -c/w synthroid      MISC:  GI ppx: PPI   DVT ppx: lovenox full Ac tmrw am if CT leg is normal   Diet: DASH   Activity:ATT   76-year-old female with history of A-fib on Eliquis, COPD, CHF, GERD, hypothyroidism, Parkinson's, status post pacemaker presents ED status post fall.    As per Patient states that she was walking to the bathroom and her feet fell from under her, states that her feet hit the tub and her left leg went underneath her. Pt reports pain to the L hip and L upper leg   Pt is wheel chair bound at baseline.   Denies any head to head, denies any LOC, any difficulty breathing, lightheadedness, chest pain, or palpitations prior to falling      #Fall complicated by acute comminuted nondisplaced fracture of the left proximal tibial shaft in addition to nondisplaced fibular neck fracture  -In Ed vitally stable on 3l of NC with sat of 94 %   -On  Xray of Left knee and tib-fib  Comminuted nondisplaced fracture of the proximal tibial shaft.   Nondisplaced fibular shaft/neck fracture is noted as well. Apparent   fracture deformity of the medial tibial eminence. Small to moderate knee   joint effusion. Superior patella traction enthesophyte. Vascular   calcifications  -pt is wheel chair bound on baseline   PLAN   -pain control ,started on Morphine 4mg TID (if pain stays uncontrol can go up as Q4hrs)   - bed rest   - f/u Ortho team consulted for eval , if operative intervention is needed can stop Lovenox   - f/u  CT scan of left lower extremity for possible hematoma (can stop AC if thr is hematoma or else keep patient on full anticoagulation ie Lovenox )  -f/u Xray ankle         # Diverticulosis   #constipated   -CTA/P  showed :  Sigmoid diverticulosis with adjacent inflammation consistent with   diverticulitis.   Nonspecific rectal wall wall thickening. Direct visualization   recommended on a nonurgent basis  plan  gave bowel regimen         #DM2  SSI   -monitor FS goal 140-180    #COPD   #Chronic respiratory failure on 3-4L NC O2  -c/w inhalers    #Chronic Afib  -c/w xarelto, amiodarone  will start  on Lovenox here     #Chronic HFpEF  #Hx Hypotension  -c/w midodrine q8    #GERD  -c/w pepcid    #Hypothyroidism  -c/w synthroid      MISC:  GI ppx: PPI   DVT ppx: lovenox full Ac tmrw am if CT leg is normal   Diet: DASH   Activity:ATT

## 2025-01-27 ENCOUNTER — TRANSCRIBE ORDERS (OUTPATIENT)
Dept: ADMINISTRATIVE | Facility: HOSPITAL | Age: 40
End: 2025-01-27
Payer: COMMERCIAL

## 2025-01-27 DIAGNOSIS — Z12.31 VISIT FOR SCREENING MAMMOGRAM: Primary | ICD-10-CM

## 2025-01-31 ENCOUNTER — HOSPITAL ENCOUNTER (OUTPATIENT)
Dept: GENERAL RADIOLOGY | Facility: HOSPITAL | Age: 40
Discharge: HOME | End: 2025-01-31
Admitting: PHYSICIAN ASSISTANT
Payer: COMMERCIAL

## 2025-01-31 ENCOUNTER — OFFICE VISIT (OUTPATIENT)
Dept: INTERNAL MEDICINE | Facility: CLINIC | Age: 40
End: 2025-01-31
Payer: COMMERCIAL

## 2025-01-31 VITALS
DIASTOLIC BLOOD PRESSURE: 128 MMHG | WEIGHT: 257 LBS | HEART RATE: 124 BPM | SYSTOLIC BLOOD PRESSURE: 178 MMHG | TEMPERATURE: 98.7 F | BODY MASS INDEX: 45.54 KG/M2 | HEIGHT: 63 IN | OXYGEN SATURATION: 99 %

## 2025-01-31 DIAGNOSIS — J30.9 ALLERGIC RHINITIS, UNSPECIFIED SEASONALITY, UNSPECIFIED TRIGGER: ICD-10-CM

## 2025-01-31 DIAGNOSIS — Z13.21 ENCOUNTER FOR VITAMIN DEFICIENCY SCREENING: ICD-10-CM

## 2025-01-31 DIAGNOSIS — R06.02 SHORTNESS OF BREATH: ICD-10-CM

## 2025-01-31 DIAGNOSIS — E11.65 TYPE 2 DIABETES MELLITUS WITH HYPERGLYCEMIA, WITHOUT LONG-TERM CURRENT USE OF INSULIN: ICD-10-CM

## 2025-01-31 DIAGNOSIS — E55.9 VITAMIN D DEFICIENCY: ICD-10-CM

## 2025-01-31 DIAGNOSIS — N92.0 MENORRHAGIA WITH REGULAR CYCLE: ICD-10-CM

## 2025-01-31 DIAGNOSIS — E78.2 MIXED HYPERLIPIDEMIA: Primary | ICD-10-CM

## 2025-01-31 DIAGNOSIS — I10 ESSENTIAL HYPERTENSION: ICD-10-CM

## 2025-01-31 DIAGNOSIS — Z13.29 THYROID DISORDER SCREENING: ICD-10-CM

## 2025-01-31 LAB
EXPIRATION DATE: ABNORMAL
HBA1C MFR BLD: 10.6 % (ref 4.5–5.7)
Lab: ABNORMAL

## 2025-01-31 PROCEDURE — 71046 X-RAY EXAM CHEST 2 VIEWS: CPT

## 2025-01-31 RX ORDER — CHLORTHALIDONE 50 MG/1
50 TABLET ORAL DAILY
Qty: 90 TABLET | Refills: 1 | Status: SHIPPED | OUTPATIENT
Start: 2025-01-31

## 2025-01-31 RX ORDER — BUDESONIDE AND FORMOTEROL FUMARATE DIHYDRATE 160; 4.5 UG/1; UG/1
2 AEROSOL RESPIRATORY (INHALATION) 2 TIMES DAILY
Qty: 10.2 EACH | Refills: 3 | Status: SHIPPED | OUTPATIENT
Start: 2025-01-31

## 2025-01-31 RX ORDER — ROSUVASTATIN CALCIUM 20 MG/1
20 TABLET, COATED ORAL NIGHTLY
Qty: 90 TABLET | Refills: 3 | Status: SHIPPED | OUTPATIENT
Start: 2025-01-31

## 2025-01-31 RX ORDER — LISINOPRIL 40 MG/1
40 TABLET ORAL DAILY
Qty: 90 TABLET | Refills: 1 | Status: SHIPPED | OUTPATIENT
Start: 2025-01-31

## 2025-01-31 RX ORDER — CETIRIZINE HYDROCHLORIDE 10 MG/1
10 TABLET ORAL DAILY
Qty: 90 TABLET | Refills: 1 | Status: SHIPPED | OUTPATIENT
Start: 2025-01-31

## 2025-01-31 RX ORDER — AMLODIPINE BESYLATE 10 MG/1
10 TABLET ORAL DAILY
Qty: 90 TABLET | Refills: 1 | Status: SHIPPED | OUTPATIENT
Start: 2025-01-31

## 2025-01-31 RX ORDER — ERGOCALCIFEROL 1.25 MG/1
50000 CAPSULE, LIQUID FILLED ORAL WEEKLY
Qty: 12 CAPSULE | Refills: 1 | Status: SHIPPED | OUTPATIENT
Start: 2025-01-31

## 2025-01-31 NOTE — PATIENT INSTRUCTIONS
"BMI for Adults  Body mass index (BMI) is a number found using a person's weight and height. BMI can help tell how much of a person's weight is made up of fat. BMI does not measure body fat directly. It is used instead of tests that directly measure body fat, which can be difficult and expensive.  What are BMI measurements used for?  BMI is useful to:  Find out if your weight puts you at higher risk for medical problems.  Help recommend changes, such as in diet and exercise. This can help you reach a healthy weight. BMI screening can be done again to see if these changes are working.  How is BMI calculated?  Your height and weight are measured. The BMI is found from those numbers. This can be done with U[place] or metric measurements. Note that charts and online BMI calculators are available to help you find your BMI quickly and easily without doing these calculations.  To calculate your BMI in [place] measurements:  Measure your weight in pounds (lb).  Multiply the number of pounds by 703.  So, for an adult who weighs 150 lb, multiply that number by 703: 150 x 703, which equals 105,450.  Measure your height in inches. Then multiply that number by itself to get a measurement called \"inches squared.\"  So, for an adult who is 70 inches tall, the \"inches squared\" measurement is 70 inches x 70 inches, which equals 4,900 inches squared.  Divide the total from step 2 (number of lb x 703) by the total from step 3 (inches squared): 105,450 ÷ 4,900 = 21.5. This is your BMI.  To calculate your BMI in metric measurements:    Measure your weight in kilograms (kg).  For this example, the weight is 70 kg.  Measure your height in meters (m). Then multiply that number by itself to get a measurement called \"meters squared.\"  So, for an adult who is 1.75 m tall, the \"meters squared\" measurement is 1.75 m x 1.75 m, which equals 3.1 meters squared.  Divide the number of kilograms (your weight) by the meters squared number. In this example: 70 " ÷ 3.1 = 22.6. This is your BMI.  What do the results mean?  BMI charts are used to see if you are underweight, normal weight, overweight, or obese. The following guidelines will be used:  Underweight: BMI less than 18.5.  Normal weight: BMI between 18.5 and 24.9.  Overweight: BMI between 25 and 29.9.  Obese: BMI of 30 or above.  BMI is a tool and cannot diagnose a condition. Talk with your health care provider about what your BMI means for you. Keep these notes in mind:  Weight includes fat and muscle. Someone with a muscular build, such as an athlete, may have a BMI that is higher than 24.9. In cases like these, BMI is not a correct measure of body fat.  If you have a BMI of 25 or higher, your provider may need to do more testing to find out if excess body fat is the cause.  BMI is measured the same way for males and females. Females usually have more body fat than males of the same height and weight.  Where to find more information  For more information about BMI, including tools to quickly find your BMI, go to:  Centers for Disease Control and Prevention: cdc.gov  American Heart Association: heart.org  National Heart, Lung, and [employer] [place]: nhlbi.nih.gov  This information is not intended to replace advice given to you by your health care provider. Make sure you discuss any questions you have with your health care provider.  Document Revised: 09/07/2023 Document Reviewed: 08/31/2023  [employer] Patient Education © 2024 [employer]Advance Directive    Advance directives are legal papers that state your wishes about health care decisions. They let your wishes be known to family, friends, and health care providers if you become unable to speak for yourself.   You should write these papers out over time rather than all at once. They can be changed and updated at any time.  The types of advance directives include:  Medical power of  (POA).  Living neri.  Do not resuscitate (DNR) or do not attempt  resuscitation (DNAR) orders.  What are a health care proxy and medical POA?  A health care proxy is also called a health care agent. It's a person you choose to make medical decisions for you when you can't make them for yourself. In most cases, a proxy is a trusted friend or family member.  A medical POA is legal paperwork that names your proxy. It may need to be:  Signed.  Notarized.  Dated.  Copied.  Witnessed.  Added to your medical record.  You may also want to choose someone to handle your money if you can't do so. This is called a durable POA for finances. It's separate from a medical POA. You may choose your health care proxy or someone else to act as your agent in money matters.  If you don't have a proxy, or if the proxy may not be acting in your best interest, a court may choose a guardian to act on your behalf.  What is a living will?  A living will is legal paperwork that states your wishes about medical care. Providers should keep a copy of it in your medical record. You may want to give a copy to family members or friends. You can also keep a card in your wallet to let loved ones know you have a living will and where they can find it. A living will may be used if:  You're very sick with something that will end your life.  You become disabled.  You can't make decisions or speak for yourself.  Your living will should include whether:  To use or not use life support equipment. This may include machines to filter your blood or to help you breathe.  You want a DNR or DNAR order. This tells providers not to use CPR if your heart or breathing stops.  To use or not use tube feeding.  You want to be given foods and fluids.  You want a type of comfort care called palliative care. This may be given when the goal for treatment becomes comfort rather than a cure.  You want to donate your organs and tissues.  A living will doesn't say what to do with your money and property if you pass away.  What is a DNR or  DNAR?  A DNR or DNAR order is a request not to have CPR. If you don't have one of these orders, a provider will try to help you if your heart stops or you stop breathing.   If you plan to have surgery, talk with your provider about your DNR or DNAR order.  What happens if I don't have an advance directive?  Each state has its own laws about advance directives. Some states assign family decision makers to act on your behalf if you don't have an advance directive.   Check with your provider, , or state representative about the laws in your state.  Where to find more information  Each state has its own laws about advance directives. You can look up these laws at: [facility]  This information is not intended to replace advice given to you by your health care provider. Make sure you discuss any questions you have with your health care provider.  Document Revised: 05/06/2024 Document Reviewed: 05/06/2024  [employer] Patient Education © 2024 [employer]

## 2025-01-31 NOTE — PROGRESS NOTES
Office Note     Name: Deanna Putnam    : 1985     MRN: [id number]     Chief Complaint  Annual Exam, Chest Pain, and Hyperlipidemia    Subjective     History of Present Illness:  Deanna Putnam is a 39 y.o. female who presents today for routine physical.    History of Present Illness  The patient presents for evaluation of hypertension, uncontrolled diabetes, endometriosis, and fibroids.    She has been experiencing financial difficulties, leading to a lack of health insurance and discontinuation of her medications. She has been without health insurance for over a year. Her last visit was in 2023.     She has been living with her father since 2024 due to financial constraints.     She has not had a mammogram but has one scheduled for 2024. She has not received her influenza or COVID-19 vaccines. She had a diabetic eye exam on 2023 and needs another one. She does not get urinary tract infections. She does not check her blood sugar at home and does not have supplies. She has lost weight. She does not perform self-breast exams. She has not noticed any bleeding. Her bowel movements are regular, almost daily. She has swelling in her legs.    She has been experiencing headaches. She has been experiencing shortness of breath, which is unusual for her. She recalls a similar episode in 2022 when she was hospitalized in the ICU for 3 days due to high blood pressure. She was previously on amlodipine, lisinopril, and chlorthalidone for hypertension management. She has been experiencing chest pain. She has a history of fluid accumulation around her heart, which required hospitalization and treatment with a BiPAP machine.    She has noticed increased difficulty focusing at work, which she attributes to suspected ADHD. She is a  for [employer] and needs to focus more. She does not smoke or vape but consumes a significant amount of caffeine. She experiences severe  headaches if she does not consume caffeine. She tries to drink water to help with it.    She has a history of PCOS, fibroids, and endometriosis. Her menstrual cycles are regular but very painful, with significant blood clotting. She has a history of iron deficiency. Her last gynecologist suggested a hysterectomy as a treatment option for her endometriosis and fibroids.    Supplemental Information  She has been experiencing hearing loss.    SOCIAL HISTORY  She does not smoke or vape. She drinks caffeine. She has been living with her father since 09/2024 due to financial constraints.    MEDICATIONS  Discontinued: Amlodipine, lisinopril, chlorthalidone, rosuvastatin, Lexapro, cetirizine, Jardiance, Ozempic.    IMMUNIZATIONS  She usually gets the influenza vaccine, but she has not had it in a while. She has not received the COVID-19 vaccines.    Review of Systems:   Review of Systems   Constitutional:  Positive for activity change and appetite change.   HENT:  Positive for hearing loss.    Respiratory:  Positive for shortness of breath.    Cardiovascular:  Positive for chest pain. Negative for leg swelling.   Genitourinary:  Positive for menstrual problem.       Past Medical History:   Past Medical History:   Diagnosis Date    Allergic     Anemia     Arthritis of back January 2011    Asthma     Bursitis of hip May 2022    Depression     Diabetes mellitus     Hypertension     Obesity     HASEEB (obstructive sleep apnea)     Osteoarthritis of back     Prediabetes        Past Surgical History:   Past Surgical History:   Procedure Laterality Date    CHOLECYSTECTOMY  2018    DIAGNOSTIC LAPAROSCOPY  12/13/2018    ENDOMETRIAL ABLATION  2018    Did not get endometriosis out    EYE SURGERY  2015    eyelid lift    GALLBLADDER SURGERY  04/2018    TONSILLECTOMY      WISDOM TOOTH EXTRACTION         Family History:   Family History   Problem Relation Age of Onset    Endometrial cancer Mother 59    Diabetes Mother     Hypertension Mother      Cancer Mother     Hyperlipidemia Mother     Miscarriages / Stillbirths Mother     Vision loss Mother     Diabetes Father     Hypertension Father     Hyperlipidemia Father     Breast cancer Maternal Grandmother 80    Colon cancer Maternal Grandmother 90    Arthritis Maternal Grandmother     Birth defects Maternal Grandmother     Diabetes Maternal Grandmother     Cancer Maternal Grandmother     Hyperlipidemia Maternal Grandmother     Vision loss Maternal Grandmother     Diabetes Paternal Grandmother     Hypertension Paternal Grandmother     Hyperlipidemia Paternal Grandmother     Breast cancer Maternal Aunt 60    Cancer Maternal Aunt     Hypertension Paternal Grandfather     Diabetes Paternal Grandfather     Hyperlipidemia Paternal Grandfather     Breast cancer Cousin     Uterine cancer Neg Hx     Ovarian cancer Neg Hx        Social History:   Social History     Socioeconomic History    Marital status: Single   Tobacco Use    Smoking status: Never    Smokeless tobacco: Never   Vaping Use    Vaping status: Never Used   Substance and Sexual Activity    Alcohol use: Yes     Alcohol/week: 2.0 standard drinks of alcohol     Types: 2 Glasses of wine per week     Comment: rarely    Drug use: No    Sexual activity: Not Currently     Partners: Female, Male     Birth control/protection: Condom       Immunizations:   Immunization History   Administered Date(s) Administered    COVID-19 (PFIZER) Purple Cap Monovalent 04/22/2021, 05/12/2021    Flu Vaccine Split Quad 11/14/2014    Fluad Quad 65+ 10/22/2019    Fluzone  >6mos 11/03/2017    Hepatitis A 09/19/2018, 11/30/2018, 10/21/2019    Hepatitis B Adult/Adolescent IM 04/25/2000, 05/26/2000, 10/26/2000    Hib (PRP-OMP) 09/19/1985    IPV 1985, 01/06/1984, 03/14/1985, 03/08/1988    MMR 12/21/1984    Meningococcal MCV4P (Menactra) 08/08/2001    Pneumococcal Conjugate 20-Valent (PCV20) 11/21/2023    Pneumococcal Polysaccharide (PPSV23) 11/14/2014    Tdap 1985,  "01/06/1984, 03/16/1984, 03/14/1985, 03/08/1988, 04/12/2016    Varicella 04/25/2000, 05/26/2000        Medications:     Current Outpatient Medications:     amLODIPine (NORVASC) 10 MG tablet, Take 1 tablet by mouth Daily., Disp: 90 tablet, Rfl: 1    budesonide-formoterol (Symbicort) 160-4.5 MCG/ACT inhaler, Inhale 2 puffs 2 (Two) Times a Day., Disp: 10.2 each, Rfl: 3    cetirizine (zyrTEC) 10 MG tablet, Take 1 tablet by mouth Daily., Disp: 90 tablet, Rfl: 1    chlorthalidone (HYGROTEN) 50 MG tablet, Take 1 tablet by mouth Daily., Disp: 90 tablet, Rfl: 1    empagliflozin (Jardiance) 10 MG tablet tablet, Take 1 tablet by mouth Daily., Disp: 90 tablet, Rfl: 1    lisinopril (PRINIVIL,ZESTRIL) 40 MG tablet, Take 1 tablet by mouth Daily., Disp: 90 tablet, Rfl: 1    rosuvastatin (CRESTOR) 20 MG tablet, Take 1 tablet by mouth Every Night., Disp: 90 tablet, Rfl: 3    vitamin D (ERGOCALCIFEROL) 1.25 MG (14794 UT) capsule capsule, Take 1 capsule by mouth 1 (One) Time Per Week., Disp: 12 capsule, Rfl: 1    fluticasone (FLONASE) 50 MCG/ACT nasal spray, SPRAY 2 SPRAYS INTO THE NOSTRIL(S) AS DIRECTED DAILY (Patient not taking: Reported on 1/31/2025), Disp: 48 g, Rfl: 1    Semaglutide,0.25 or 0.5MG/DOS, (Ozempic, 0.25 or 0.5 MG/DOSE,) 2 MG/3ML solution pen-injector, Inject 0.25 mg under the skin into the appropriate area as directed 1 (One) Time Per Week. (Patient not taking: Reported on 1/31/2025), Disp: 2 mL, Rfl: 0    Allergies:   Allergies   Allergen Reactions    Egg-Derived Products Nausea And Vomiting    Mold Extract [Trichophyton] Unknown (See Comments)     unknown       Objective     Vital Signs  BP (!) 178/128 (BP Location: Right arm, Patient Position: Sitting, Cuff Size: Adult)   Pulse (!) 124   Temp 98.7 °F (37.1 °C) (Temporal)   Ht 160 cm (62.99\")   Wt 117 kg (257 lb)   SpO2 99%   BMI 45.54 kg/m²   Body mass index is 45.54 kg/m².     Class 3 Severe Obesity (BMI >=40). Obesity-related health conditions include the " following: hypertension. Obesity is unchanged. BMI is is above average; BMI management plan is completed. We discussed low calorie, low carb based diet program, portion control, and increasing exercise.       Physical Exam  Vitals and nursing note reviewed.   Constitutional:       Appearance: She is obese.   HENT:      Head: Normocephalic and atraumatic.      Right Ear: Tympanic membrane normal.      Left Ear: Tympanic membrane normal.      Nose: Nose normal.      Mouth/Throat:      Mouth: Mucous membranes are moist.      Pharynx: Oropharynx is clear.   Eyes:      Extraocular Movements: Extraocular movements intact.      Conjunctiva/sclera: Conjunctivae normal.      Pupils: Pupils are equal, round, and reactive to light.   Cardiovascular:      Rate and Rhythm: Normal rate and regular rhythm.      Pulses: Normal pulses.      Heart sounds: Normal heart sounds.   Pulmonary:      Effort: Pulmonary effort is normal.      Breath sounds: Normal breath sounds. No wheezing.   Abdominal:      General: There is no distension.   Musculoskeletal:         General: Normal range of motion.      Cervical back: Normal range of motion and neck supple.   Neurological:      General: No focal deficit present.      Mental Status: She is alert and oriented to person, place, and time.   Psychiatric:         Mood and Affect: Mood normal.         Behavior: Behavior normal.          ECG 12 Lead    Date/Time: 1/31/2025 3:14 PM  Performed by: Roberta Rodriguez PA-C    Authorized by: Roberta Rodriguez PA-C  Comparison: not compared with previous ECG   Rhythm: sinus tachycardia  BPM: 121    Clinical impression: abnormal EKG  Comments: Sinus tachycardia 121bpm           Results:  No results found for this or any previous visit (from the past 24 hours).     Assessment and Plan     Assessment/Plan:  Diagnoses and all orders for this visit:    1. Mixed hyperlipidemia (Primary)  Overview:  Try and cut back on processed foods and fatty foods  in the diet.  Continue rosuvastatin 20 mg nightly.    Orders:  -     Lipid Panel; Future    2. Essential hypertension  Overview:  Continue amlodipine 10 mg and lisinopril 40 mg daily.  Significant improvement in blood pressure.    Orders:  -     amLODIPine (NORVASC) 10 MG tablet; Take 1 tablet by mouth Daily.  Dispense: 90 tablet; Refill: 1  -     chlorthalidone (HYGROTEN) 50 MG tablet; Take 1 tablet by mouth Daily.  Dispense: 90 tablet; Refill: 1  -     lisinopril (PRINIVIL,ZESTRIL) 40 MG tablet; Take 1 tablet by mouth Daily.  Dispense: 90 tablet; Refill: 1  -     ECG 12 Lead  -     CBC & Differential; Future  -     Comprehensive Metabolic Panel; Future  -     MicroAlbumin, Urine, Random - Urine, Clean Catch; Future    3. Type 2 diabetes mellitus with hyperglycemia, without long-term current use of insulin  Overview:  Continue to take Jardiance as instructed.  Fasting labs today.     Orders:  -     empagliflozin (Jardiance) 10 MG tablet tablet; Take 1 tablet by mouth Daily.  Dispense: 90 tablet; Refill: 1  -     POC Glycosylated Hemoglobin (Hb A1C)  -     Ambulatory Referral for Diabetic Eye Exam-Ophthalmology  -     CBC & Differential; Future  -     Comprehensive Metabolic Panel; Future  -     Hemoglobin A1c; Future  -     MicroAlbumin, Urine, Random - Urine, Clean Catch; Future    4. Allergic rhinitis, unspecified seasonality, unspecified trigger  -     cetirizine (zyrTEC) 10 MG tablet; Take 1 tablet by mouth Daily.  Dispense: 90 tablet; Refill: 1    5. Vitamin D deficiency  -     Vitamin D,25-Hydroxy; Future    6. Thyroid disorder screening  -     TSH; Future  -     T4, Free; Future  -     T3, Free; Future    7. Encounter for vitamin deficiency screening  -     Vitamin B12; Future    8. Menorrhagia with regular cycle  Overview:  Likely due to fibroid uterus.  She has had consultation with gynecology.  I do feel her heavy periods may be the source of her decreasing hemoglobin.  We will continue to monitor.   Gynecologist did recommend hysterectomy, but patient wants to see if she can biologically have children.  Discussed possible Mirena, will depend on location and size of fibroids.    Orders:  -     US Non-ob Transvaginal; Future  -     Iron Profile; Future    9. Shortness of breath  -     Cancel: XR Chest PA & Lateral; Future  -     XR Chest PA & Lateral; Future    Other orders  -     rosuvastatin (CRESTOR) 20 MG tablet; Take 1 tablet by mouth Every Night.  Dispense: 90 tablet; Refill: 3  -     budesonide-formoterol (Symbicort) 160-4.5 MCG/ACT inhaler; Inhale 2 puffs 2 (Two) Times a Day.  Dispense: 10.2 each; Refill: 3  -     vitamin D (ERGOCALCIFEROL) 1.25 MG (47610 UT) capsule capsule; Take 1 capsule by mouth 1 (One) Time Per Week.  Dispense: 12 capsule; Refill: 1        Assessment & Plan  1. Hypertension.  Her elevated heart rate and chest discomfort are likely due to uncontrolled hypertension. She will be restarted on amlodipine, lisinopril 40 mg, and chlorthalidone. A chest x-ray will be performed today to rule out any new issues, given her history of fluid accumulation around the heart. She is advised to reduce caffeine intake and increase water consumption. She is also advised to avoid over-the-counter medications that can elevate blood pressure.    2. Diabetes Mellitus.  Her diabetes is currently uncontrolled, which may be contributing to her menstrual irregularities. She will be restarted on her diabetes medications. An A1c test will be conducted today. A diabetic eye exam will be scheduled. She is advised to monitor her blood sugar levels at home, and supplies will be provided if needed.    3. Anxiety and depression.  She reports improvement in her anxiety and depression symptoms. She will discontinue Lexapro.    4. Allergies.  Cetirizine will be refilled to manage her allergy symptoms.    5. Vitamin D deficiency.  A prescription for vitamin D will be provided.    6. Polycystic Ovary Syndrome (PCOS).  Her  PCOS is likely exacerbated by her uncontrolled diabetes. An ultrasound will be ordered to evaluate her condition. An iron profile will also be checked due to her history of iron deficiency.    7. Endometriosis.  She reports painful periods and a history of endometriosis. An ultrasound will be ordered to assess the current status of her endometriosis.    8. Difficulty with concentration.  She reports difficulty focusing, which may be related to her current job demands. She is advised to get her blood pressure and blood sugar levels under control before further evaluating ADHD.    9. Health maintenance.  A mammogram is scheduled for March 2025. A Pap smear will be conducted during her next visit. Labs will be ordered to assess her overall health status, including blood counts, kidney, liver, thyroid, and vitamin levels.    Follow-up  The patient will follow up in 1 month.       Follow Up  Return in about 4 weeks (around 2/28/2025) for RECHECK 30MIN.    Patient or patient representative verbalized consent for the use of Ambient Listening during the visit with  Roberta Rodriguez PA-C for chart documentation. 2/2/2025  10:39 EST      Roberta Rodriguez PA-C   [facility] [place] [id number]

## 2025-02-11 LAB
NCCN CRITERIA FLAG: ABNORMAL
TYRER CUZICK SCORE: 21.8

## 2025-02-12 ENCOUNTER — HOSPITAL ENCOUNTER (OUTPATIENT)
Dept: MAMMOGRAPHY | Facility: HOSPITAL | Age: 40
Discharge: HOME | End: 2025-02-12
Admitting: PHYSICIAN ASSISTANT
Payer: COMMERCIAL

## 2025-02-12 DIAGNOSIS — Z12.31 VISIT FOR SCREENING MAMMOGRAM: ICD-10-CM

## 2025-02-12 PROCEDURE — 77067 SCR MAMMO BI INCL CAD: CPT

## 2025-02-12 PROCEDURE — 77063 BREAST TOMOSYNTHESIS BI: CPT

## 2025-02-16 ENCOUNTER — HOSPITAL ENCOUNTER (OUTPATIENT)
Facility: HOSPITAL | Age: 40
Discharge: HOME | End: 2025-02-16
Admitting: PHYSICIAN ASSISTANT
Payer: COMMERCIAL

## 2025-02-16 DIAGNOSIS — N92.0 MENORRHAGIA WITH REGULAR CYCLE: ICD-10-CM

## 2025-02-16 PROCEDURE — 76830 TRANSVAGINAL US NON-OB: CPT

## 2025-02-25 ENCOUNTER — DOCUMENTATION (OUTPATIENT)
Dept: GENETICS | Facility: HOSPITAL | Age: 40
End: 2025-02-25
Payer: COMMERCIAL

## 2025-02-25 NOTE — PROGRESS NOTES
Meets NCCN Criteria for Genetic Testing  Declines genetic testing? Y   Reason for decline? Could Not Reach Patient   If Reason for Decline is Previous Testing    [facility] CARE     Non-CARE     Non-CARE Confirmation    Navigator Confirmed?     Patient Reported?     Elevated Tyrer-Cuzick Score ? Or Equal to 20%    Declines referral? Y   Reason for decline? Could Not Reach Patient

## 2025-02-26 ENCOUNTER — LAB (OUTPATIENT)
Dept: LAB | Facility: HOSPITAL | Age: 40
End: 2025-02-26
Payer: COMMERCIAL

## 2025-02-26 DIAGNOSIS — N92.0 MENORRHAGIA WITH REGULAR CYCLE: ICD-10-CM

## 2025-02-26 DIAGNOSIS — E78.2 MIXED HYPERLIPIDEMIA: ICD-10-CM

## 2025-02-26 DIAGNOSIS — Z13.21 ENCOUNTER FOR VITAMIN DEFICIENCY SCREENING: ICD-10-CM

## 2025-02-26 DIAGNOSIS — E11.65 TYPE 2 DIABETES MELLITUS WITH HYPERGLYCEMIA, WITHOUT LONG-TERM CURRENT USE OF INSULIN: ICD-10-CM

## 2025-02-26 DIAGNOSIS — Z13.29 THYROID DISORDER SCREENING: ICD-10-CM

## 2025-02-26 DIAGNOSIS — I10 ESSENTIAL HYPERTENSION: ICD-10-CM

## 2025-02-26 DIAGNOSIS — E55.9 VITAMIN D DEFICIENCY: ICD-10-CM

## 2025-02-26 LAB
25(OH)D3 SERPL-MCNC: 18.8 NG/ML (ref 30–100)
ALBUMIN SERPL-MCNC: 4 G/DL (ref 3.5–5.2)
ALBUMIN UR-MCNC: 5.2 MG/DL
ALBUMIN/GLOB SERPL: 1.2 G/DL
ALP SERPL-CCNC: 84 U/L (ref 39–117)
ALT SERPL W P-5'-P-CCNC: 16 U/L (ref 1–33)
ANION GAP SERPL CALCULATED.3IONS-SCNC: 13 MMOL/L (ref 5–15)
AST SERPL-CCNC: 14 U/L (ref 1–32)
BASOPHILS # BLD AUTO: 0.04 10*3/MM3 (ref 0–0.2)
BASOPHILS NFR BLD AUTO: 0.5 % (ref 0–1.5)
BILIRUB SERPL-MCNC: 0.4 MG/DL (ref 0–1.2)
BUN SERPL-MCNC: 11 MG/DL (ref 6–20)
BUN/CREAT SERPL: 14.1 (ref 7–25)
CALCIUM SPEC-SCNC: 9.1 MG/DL (ref 8.6–10.5)
CHLORIDE SERPL-SCNC: 100 MMOL/L (ref 98–107)
CHOLEST SERPL-MCNC: 192 MG/DL (ref 0–200)
CO2 SERPL-SCNC: 26 MMOL/L (ref 22–29)
CREAT SERPL-MCNC: 0.78 MG/DL (ref 0.57–1)
DEPRECATED RDW RBC AUTO: 46.2 FL (ref 37–54)
EGFRCR SERPLBLD CKD-EPI 2021: 98 ML/MIN/1.73
EOSINOPHIL # BLD AUTO: 0.07 10*3/MM3 (ref 0–0.4)
EOSINOPHIL NFR BLD AUTO: 0.9 % (ref 0.3–6.2)
ERYTHROCYTE [DISTWIDTH] IN BLOOD BY AUTOMATED COUNT: 16.1 % (ref 12.3–15.4)
GLOBULIN UR ELPH-MCNC: 3.4 GM/DL
GLUCOSE SERPL-MCNC: 226 MG/DL (ref 65–99)
HBA1C MFR BLD: 10.8 % (ref 4.8–5.6)
HCT VFR BLD AUTO: 33.5 % (ref 34–46.6)
HDLC SERPL-MCNC: 43 MG/DL (ref 40–60)
HGB BLD-MCNC: 10 G/DL (ref 12–15.9)
IMM GRANULOCYTES # BLD AUTO: 0.04 10*3/MM3 (ref 0–0.05)
IMM GRANULOCYTES NFR BLD AUTO: 0.5 % (ref 0–0.5)
IRON 24H UR-MRATE: 80 MCG/DL (ref 37–145)
IRON SATN MFR SERPL: 17 % (ref 20–50)
LDLC SERPL CALC-MCNC: 127 MG/DL (ref 0–100)
LDLC/HDLC SERPL: 2.89 {RATIO}
LYMPHOCYTES # BLD AUTO: 2.64 10*3/MM3 (ref 0.7–3.1)
LYMPHOCYTES NFR BLD AUTO: 32.4 % (ref 19.6–45.3)
MCH RBC QN AUTO: 24 PG (ref 26.6–33)
MCHC RBC AUTO-ENTMCNC: 29.9 G/DL (ref 31.5–35.7)
MCV RBC AUTO: 80.3 FL (ref 79–97)
MONOCYTES # BLD AUTO: 0.56 10*3/MM3 (ref 0.1–0.9)
MONOCYTES NFR BLD AUTO: 6.9 % (ref 5–12)
NEUTROPHILS NFR BLD AUTO: 4.81 10*3/MM3 (ref 1.7–7)
NEUTROPHILS NFR BLD AUTO: 58.8 % (ref 42.7–76)
NRBC BLD AUTO-RTO: 0 /100 WBC (ref 0–0.2)
PLATELET # BLD AUTO: 478 10*3/MM3 (ref 140–450)
PMV BLD AUTO: 11.2 FL (ref 6–12)
POTASSIUM SERPL-SCNC: 3.9 MMOL/L (ref 3.5–5.2)
PROT SERPL-MCNC: 7.4 G/DL (ref 6–8.5)
RBC # BLD AUTO: 4.17 10*6/MM3 (ref 3.77–5.28)
SODIUM SERPL-SCNC: 139 MMOL/L (ref 136–145)
T3FREE SERPL-MCNC: 3.32 PG/ML (ref 2–4.4)
T4 FREE SERPL-MCNC: 1.35 NG/DL (ref 0.92–1.68)
TIBC SERPL-MCNC: 460 MCG/DL (ref 298–536)
TRANSFERRIN SERPL-MCNC: 309 MG/DL (ref 200–360)
TRIGL SERPL-MCNC: 123 MG/DL (ref 0–150)
TSH SERPL DL<=0.05 MIU/L-ACNC: 1.49 UIU/ML (ref 0.27–4.2)
VIT B12 BLD-MCNC: 431 PG/ML (ref 211–946)
VLDLC SERPL-MCNC: 22 MG/DL (ref 5–40)
WBC NRBC COR # BLD AUTO: 8.16 10*3/MM3 (ref 3.4–10.8)

## 2025-02-26 PROCEDURE — 84466 ASSAY OF TRANSFERRIN: CPT

## 2025-02-26 PROCEDURE — 84439 ASSAY OF FREE THYROXINE: CPT

## 2025-02-26 PROCEDURE — 82306 VITAMIN D 25 HYDROXY: CPT

## 2025-02-26 PROCEDURE — 83540 ASSAY OF IRON: CPT

## 2025-02-26 PROCEDURE — 83036 HEMOGLOBIN GLYCOSYLATED A1C: CPT

## 2025-02-26 PROCEDURE — 80061 LIPID PANEL: CPT

## 2025-02-26 PROCEDURE — 82043 UR ALBUMIN QUANTITATIVE: CPT

## 2025-02-26 PROCEDURE — 84481 FREE ASSAY (FT-3): CPT

## 2025-02-26 PROCEDURE — 82607 VITAMIN B-12: CPT

## 2025-02-26 PROCEDURE — 80050 GENERAL HEALTH PANEL: CPT

## 2025-06-10 ENCOUNTER — OFFICE VISIT (OUTPATIENT)
Dept: CARDIOLOGY | Facility: CLINIC | Age: 40
End: 2025-06-10
Payer: COMMERCIAL

## 2025-06-10 VITALS
SYSTOLIC BLOOD PRESSURE: 146 MMHG | WEIGHT: 247.2 LBS | HEART RATE: 103 BPM | HEIGHT: 63 IN | DIASTOLIC BLOOD PRESSURE: 90 MMHG | BODY MASS INDEX: 43.8 KG/M2 | OXYGEN SATURATION: 99 %

## 2025-06-10 DIAGNOSIS — E11.9 TYPE 2 DIABETES MELLITUS WITHOUT COMPLICATION, WITHOUT LONG-TERM CURRENT USE OF INSULIN: ICD-10-CM

## 2025-06-10 DIAGNOSIS — E11.65 TYPE 2 DIABETES MELLITUS WITH HYPERGLYCEMIA, WITHOUT LONG-TERM CURRENT USE OF INSULIN: ICD-10-CM

## 2025-06-10 DIAGNOSIS — I10 ESSENTIAL HYPERTENSION: Primary | ICD-10-CM

## 2025-06-10 DIAGNOSIS — E78.2 MIXED HYPERLIPIDEMIA: ICD-10-CM

## 2025-06-10 DIAGNOSIS — E11.65 TYPE 2 DIABETES MELLITUS WITH HYPERGLYCEMIA, WITHOUT LONG-TERM CURRENT USE OF INSULIN: Primary | ICD-10-CM

## 2025-06-10 PROCEDURE — 99214 OFFICE O/P EST MOD 30 MIN: CPT | Performed by: INTERNAL MEDICINE

## 2025-06-10 RX ORDER — ROSUVASTATIN CALCIUM 20 MG/1
20 TABLET, COATED ORAL NIGHTLY
Qty: 90 TABLET | Refills: 3 | Status: SHIPPED | OUTPATIENT
Start: 2025-06-10

## 2025-06-10 RX ORDER — METFORMIN HYDROCHLORIDE 500 MG/1
500 TABLET, EXTENDED RELEASE ORAL
Qty: 30 TABLET | Refills: 11 | Status: SHIPPED | OUTPATIENT
Start: 2025-06-10

## 2025-06-10 NOTE — PROGRESS NOTES
[facility] Cardiology  Follow Up Visit  Deanna Putnam  1985    VISIT DATE:  06/10/25    PCP:   Roberta Rodriguez PA-C  [address] [address]          CC:  Mixed hyperlipidemia (Pt states that she has been having leg swelling, as well as pain in the left arm, and dizziness at times.)      Problem List:  1.  HTN  2.  DM  3.  HASEEB on CPAP  4.  Asthma     Cardiac testing:     Echo April 2022:   Moderate mitral valve regurgitation is present.  Estimated right ventricular systolic pressure from tricuspid regurgitation is mildly elevated (35-45 mmHg). Calculated right ventricular systolic pressure from tricuspid regurgitation is 35 mmHg.  Normal LVSF     Renal duplex April 2022: No evidence of bilateral renal artery stenosis    History of Present Illness:  Deanna Putnam  Is a 39 y.o. female with pertinent cardiac history detailed above.  Has had difficulty maintaining health insurance and getting to stay on her medications.  Right now she does not have any medications for diabetes or her Crestor.  She is getting the medication for hypertension.  It is better than her last PCP visit but still elevated.  She does states she has a hard time staying on the medication schedule.  Discussed referral to endocrinology for her.  She is agreeable.  She states she did have some GI side effects with metformin previously but is agreeable to retrying this.  Denies chest pain or dyspnea      Patient Active Problem List    Diagnosis Date Noted    Diabetic eye exam 09/12/2023     Note Last Updated: 11/21/2023     Has had diabetic eye exam.      Encounter for screening mammogram for malignant neoplasm of breast 09/12/2023    Mixed hyperlipidemia 08/26/2023     Note Last Updated: 9/21/2023     Try and cut back on processed foods and fatty foods in the diet.  Continue rosuvastatin 20 mg nightly.      Routine medical exam 08/24/2023     Note Last Updated: 8/26/2023     Deanna  struggles with compliance.  Long discussion today regarding importance of taking medications daily.  Fasting labs today.  Restart medications immediately.  Order for MMG placed.  Close follow up warranted.      HASEEB (obstructive sleep apnea) - possible 08/22/2022    Excessive daytime sleepiness 08/22/2022    Allergic rhinitis 05/27/2022    Left hip pain 05/27/2022     Note Last Updated: 5/27/2022     Referral to PT for evaluation.      Physical deconditioning 05/27/2022    Ankle edema, bilateral 05/27/2022     Note Last Updated: 5/27/2022     We will start chlorthalidone 50 mg tablets daily.  Follow-up in 1 month.  Try and elevate legs throughout the day.      Anemia, iron deficiency 05/26/2022    Diabetes 05/26/2022     Note Last Updated: 11/21/2023     Continue to take Jardiance as instructed.  Fasting labs today.       Hypertensive emergency 04/20/2022    Hyponatremia 04/20/2022    Medically noncompliant 04/20/2022    Menorrhagia with regular cycle 06/01/2020     Note Last Updated: 5/24/2021     Likely due to fibroid uterus.  She has had consultation with gynecology.  I do feel her heavy periods may be the source of her decreasing hemoglobin.  We will continue to monitor.  Gynecologist did recommend hysterectomy, but patient wants to see if she can biologically have children.  Discussed possible Mirena, will depend on location and size of fibroids.      Hip pain, bilateral 06/01/2020    Vitamin D deficiency 06/01/2020    Arthralgia of both knees 03/02/2020    Screening, lipid 03/02/2020    Bloating 03/02/2020    Essential hypertension 02/11/2019     Note Last Updated: 9/21/2023     Continue amlodipine 10 mg and lisinopril 40 mg daily.  Significant improvement in blood pressure.      Morbid obesity with body mass index (BMI) of 40.0 to 49.9 02/11/2019     Note Last Updated: 9/21/2023     She has lost several pounds since her last appointment.      Recurrent major depressive disorder, in full remission 02/11/2019      Note Last Updated: 9/21/2023     Improvement with lexapro 10mg daily.      Mild intermittent asthma without complication 02/11/2019     Note Last Updated: 5/24/2021     Continue Symbicort as directed.  Continue Ventolin as directed.      PCOS (polycystic ovarian syndrome) 02/11/2019    Intramural and subserous leiomyoma of uterus 12/18/2017       Allergies   Allergen Reactions    Egg-Derived Products Nausea And Vomiting    Mold Extract [Trichophyton] Unknown (See Comments)     unknown       Social History     Socioeconomic History    Marital status: Single   Tobacco Use    Smoking status: Never    Smokeless tobacco: Never   Vaping Use    Vaping status: Never Used   Substance and Sexual Activity    Alcohol use: Yes     Alcohol/week: 2.0 standard drinks of alcohol     Types: 2 Glasses of wine per week     Comment: rarely    Drug use: No    Sexual activity: Not Currently     Partners: Female, Male     Birth control/protection: Condom       Family History   Problem Relation Age of Onset    Endometrial cancer Mother 59    Diabetes Mother     Hypertension Mother     Cancer Mother     Hyperlipidemia Mother     Miscarriages / Stillbirths Mother     Vision loss Mother     Diabetes Father     Hypertension Father     Hyperlipidemia Father     Atrial fibrillation Father     Breast cancer Maternal Aunt 60    Cancer Maternal Aunt     Breast cancer Maternal Grandmother 80    Colon cancer Maternal Grandmother 90    Arthritis Maternal Grandmother     Birth defects Maternal Grandmother     Diabetes Maternal Grandmother     Cancer Maternal Grandmother     Hyperlipidemia Maternal Grandmother     Vision loss Maternal Grandmother     Diabetes Paternal Grandmother     Hypertension Paternal Grandmother     Hyperlipidemia Paternal Grandmother     Hypertension Paternal Grandfather     Diabetes Paternal Grandfather     Hyperlipidemia Paternal Grandfather     Breast cancer Cousin     Uterine cancer Neg Hx     Ovarian cancer Neg Hx   "      Current Medications:    Current Outpatient Medications:     amLODIPine (NORVASC) 10 MG tablet, Take 1 tablet by mouth Daily., Disp: 90 tablet, Rfl: 1    cetirizine (zyrTEC) 10 MG tablet, Take 1 tablet by mouth Daily., Disp: 90 tablet, Rfl: 1    chlorthalidone (HYGROTEN) 50 MG tablet, Take 1 tablet by mouth Daily., Disp: 90 tablet, Rfl: 1    empagliflozin (Jardiance) 25 MG tablet tablet, Take 1 tablet by mouth Daily., Disp: , Rfl:     fluticasone (FLONASE) 50 MCG/ACT nasal spray, SPRAY 2 SPRAYS INTO THE NOSTRIL(S) AS DIRECTED DAILY, Disp: 48 g, Rfl: 1    lisinopril (PRINIVIL,ZESTRIL) 40 MG tablet, Take 1 tablet by mouth Daily., Disp: 90 tablet, Rfl: 1    rosuvastatin (CRESTOR) 20 MG tablet, Take 1 tablet by mouth Every Night., Disp: 90 tablet, Rfl: 3    vitamin D (ERGOCALCIFEROL) 1.25 MG (10064 UT) capsule capsule, Take 1 capsule by mouth 1 (One) Time Per Week., Disp: 12 capsule, Rfl: 1    budesonide-formoterol (Symbicort) 160-4.5 MCG/ACT inhaler, Inhale 2 puffs 2 (Two) Times a Day. (Patient not taking: Reported on 6/10/2025), Disp: 10.2 each, Rfl: 3    metFORMIN ER (GLUCOPHAGE-XR) 500 MG 24 hr tablet, Take 1 tablet by mouth Daily With Breakfast., Disp: 30 tablet, Rfl: 11    Semaglutide,0.25 or 0.5MG/DOS, (Ozempic, 0.25 or 0.5 MG/DOSE,) 2 MG/3ML solution pen-injector, Inject 0.25 mg under the skin into the appropriate area as directed 1 (One) Time Per Week. (Patient not taking: Reported on 6/10/2025), Disp: 2 mL, Rfl: 0     Review of Systems   Cardiovascular: Negative.    Respiratory: Negative.         Vitals:    06/10/25 1038   BP: 146/90   BP Location: Right arm   Patient Position: Sitting   Cuff Size: Adult   Pulse: 103   SpO2: 99%   Weight: 112 kg (247 lb 3.2 oz)   Height: 160 cm (62.99\")       Physical Exam  Constitutional:       Appearance: Normal appearance.   Cardiovascular:      Rate and Rhythm: Normal rate and regular rhythm.   Pulmonary:      Effort: Pulmonary effort is normal.      Breath sounds: " Normal breath sounds.   Musculoskeletal:      Right lower leg: Edema present.      Left lower leg: Edema present.      Comments: Trace lower extremity edema   Neurological:      Mental Status: She is alert.         Diagnostic Data:  Procedures  Lab Results   Component Value Date    CHLPL 154 06/15/2015    TRIG 123 02/26/2025    HDL 43 02/26/2025     Lab Results   Component Value Date    GLUCOSE 226 (H) 02/26/2025    BUN 11 02/26/2025    CREATININE 0.78 02/26/2025     02/26/2025    K 3.9 02/26/2025     02/26/2025    CO2 26.0 02/26/2025     Lab Results   Component Value Date    HGBA1C 10.80 (H) 02/26/2025     Lab Results   Component Value Date    WBC 8.16 02/26/2025    HGB 10.0 (L) 02/26/2025    HCT 33.5 (L) 02/26/2025     (H) 02/26/2025       Assessment:   Diagnosis Plan   1. Essential hypertension        2. Mixed hyperlipidemia        3. Type 2 diabetes mellitus without complication, without long-term current use of insulin        4. Type 2 diabetes mellitus with hyperglycemia, without long-term current use of insulin  empagliflozin (Jardiance) 25 MG tablet tablet          Plan:    1.  HTN  -/90  -Chlorthalidone, lisinopril, amlodipine   -Negative renal artery duplex previously  - Normal renal function  - Continue current BP strategy.  Adjusting other medication regimen today detailed below       2.  Diabetes  -   -restart Crestor 20 mg   -A1c 10.8  - Will provide Jardiance samples ( 25mg)  - Start metformin 500 mg XR daily  -will refer to endocrine     3.  Moderate mitral regurgitation  -initially diagnosed during admission with HTN urgency, which may have exacerbated degree of severity  -EF  55%  -No heart failure symptoms  - No anginal symptoms at this time  - Clinically monitor, echo would be high cost.          ACP discussion was held with the patient during this visit. Patient does not have an advance directive, declines further assistance.      Medhat Marinelli MD [facility]